# Patient Record
Sex: FEMALE | Race: WHITE | NOT HISPANIC OR LATINO | Employment: OTHER | URBAN - METROPOLITAN AREA
[De-identification: names, ages, dates, MRNs, and addresses within clinical notes are randomized per-mention and may not be internally consistent; named-entity substitution may affect disease eponyms.]

---

## 2017-11-13 ENCOUNTER — HOSPITAL ENCOUNTER (EMERGENCY)
Facility: HOSPITAL | Age: 62
Discharge: HOME | End: 2017-11-13
Admitting: EMERGENCY MEDICINE
Payer: COMMERCIAL

## 2017-11-13 VITALS
DIASTOLIC BLOOD PRESSURE: 115 MMHG | HEIGHT: 65 IN | WEIGHT: 210 LBS | SYSTOLIC BLOOD PRESSURE: 165 MMHG | HEART RATE: 81 BPM | RESPIRATION RATE: 18 BRPM | OXYGEN SATURATION: 96 % | BODY MASS INDEX: 34.99 KG/M2 | TEMPERATURE: 97.7 F

## 2017-11-13 DIAGNOSIS — I83.899 BLEEDING FROM VARICOSE VEIN: Primary | ICD-10-CM

## 2017-11-13 PROCEDURE — 99283 EMERGENCY DEPT VISIT LOW MDM: CPT

## 2017-11-13 RX ORDER — PANTOPRAZOLE SODIUM 40 MG/1
20 TABLET, DELAYED RELEASE ORAL DAILY
COMMUNITY
End: 2020-06-15 | Stop reason: SDUPTHER

## 2017-11-13 RX ORDER — ASPIRIN 81 MG/1
81 TABLET, CHEWABLE ORAL EVERY OTHER DAY
COMMUNITY

## 2017-11-13 RX ORDER — ESCITALOPRAM OXALATE 10 MG/1
10 TABLET ORAL DAILY
COMMUNITY

## 2017-11-13 RX ORDER — MELATONIN
2000 DAILY
COMMUNITY

## 2017-11-13 RX ORDER — ENALAPRIL MALEATE 10 MG/1
10 TABLET ORAL
COMMUNITY
End: 2019-05-08

## 2017-11-13 NOTE — DISCHARGE INSTRUCTIONS
Varicose Veins, Ambulatory Care   GENERAL INFORMATION:   Varicose veins  are veins that become large, twisted, and swollen  They are common on the back of your calves, knees, and thighs  Common symptoms include the following:   · Blue, purple, or bulging veins in your legs     · Pain, swelling, or muscle cramps in your legs    · Feeling of heaviness in your legs  Seek immediate care for the following symptoms:   · A wound that does not heal or is infected    · An injury that has broken your skin and caused your varicose veins to bleed    · Swollen and hard leg    · Pain in your leg that does not go away or gets worse    · Legs or feet turn blue or black    · Warmth, tenderness, and pain in your leg (may also look swollen and red)  Treatment of varicose veins  aims to decrease symptoms, improve appearance, and prevent further problems  Treatment will depend on which veins are affected and how severe your condition is  Prescription pain medicine may be given  Ask how to take this medicine safely  Procedures may be done to remove your varicose veins  Your healthcare provider may inject a solution or use a laser to close the varicose veins  Surgery to remove long veins may also be done  Ask your healthcare provider for more information about procedures used in treating varicose veins  Manage varicose veins:   · Wear pressure stockings  The stockings are tight and put pressure on your legs  They improve blood flow and help prevent clots  · Elevate  your legs above the level of your heart for 15 to 30 minutes several times a day  This will help blood to flow back to your heart  · Avoid sitting or standing for long periods of time  This can cause the blood to collect in your legs and make your symptoms worse  Walk around for a few minutes every hour to get blood moving in your legs  · Avoid wearing tight clothing and shoes  Avoid wearing high-heeled shoes   Do not wear clothes that are tight around the waist     · Get plenty of exercise  Talk to your healthcare provider about the best exercise plan for you  Exercise can decrease your blood pressure and improve your health  Bend or rotate your ankles several times every hour  This will help blood to flow back to the heart  · Maintain a healthy weight  Your heart works harder when you are overweight and this can make varicose vein worse  Ask how much you should weigh  Ask him to help you create a weight loss plan if you are overweight  · Do not smoke  If you smoke, it is never too late to quit  Ask for information if you need help quitting  Follow up with your healthcare provider as directed:  Write down your questions so you remember to ask them during your visits  CARE AGREEMENT:   You have the right to help plan your care  Learn about your health condition and how it may be treated  Discuss treatment options with your caregivers to decide what care you want to receive  You always have the right to refuse treatment  The above information is an  only  It is not intended as medical advice for individual conditions or treatments  Talk to your doctor, nurse or pharmacist before following any medical regimen to see if it is safe and effective for you  © 2014 [address] is for End User's use only and may not be sold, redistributed or otherwise used for commercial purposes  All illustrations and images included in CareNotes® are the copyrighted property of [employer]  or Jus Ferraro

## 2017-11-15 NOTE — ED PROVIDER NOTES
History  Chief Complaint   Patient presents with    Laceration     Pt cut left leg while shaving  States she knicked her varicose vein  History provided by:  Patient and EMS personnel  Laceration   Location: left pretibial area  Quality comment:  Superficial varicose vein started bleeding upon shaving legs with manual razor  Time since incident:  25 minutes  Laceration mechanism:  Razor (shower razor)  Pain details:     Severity:  No pain  Foreign body present:  No foreign bodies  Relieved by:  Pressure  Worsened by:  Nothing  Tetanus status:  Up to date  Associated symptoms: no fever, no focal weakness, no numbness, no rash and no swelling        Prior to Admission Medications   Prescriptions Last Dose Informant Patient Reported? Taking?   aspirin 81 mg chewable tablet   Yes Yes   Sig: Chew 81 mg daily   cholecalciferol (VITAMIN D3) 1,000 units tablet   Yes Yes   Sig: Take 2,000 Units by mouth daily   enalapril (VASOTEC) 10 mg tablet   Yes Yes   Sig: Take 10 mg by mouth daily   escitalopram (LEXAPRO) 10 mg tablet   Yes Yes   Sig: Take 5 mg by mouth daily   metFORMIN (GLUCOPHAGE) 1000 MG tablet   Yes Yes   Sig: Take 1,000 mg by mouth 2 (two) times a day with meals   pantoprazole (PROTONIX) 40 mg tablet   Yes Yes   Sig: Take 40 mg by mouth daily      Facility-Administered Medications: None       Past Medical History:   Diagnosis Date    Arthritis     COPD (chronic obstructive pulmonary disease) (HCC)     Diabetes mellitus (HCC)     GERD (gastroesophageal reflux disease)     Renal disorder        Past Surgical History:   Procedure Laterality Date     SECTION      RENAL CYST EXCISION         History reviewed  No pertinent family history  I have reviewed and agree with the history as documented      Social History   Substance Use Topics    Smoking status: Never Smoker    Smokeless tobacco: Never Used    Alcohol use No        Review of Systems   Constitutional: Negative for activity change, appetite change, chills, diaphoresis, fatigue, fever and unexpected weight change  HENT: Negative for congestion, ear pain, postnasal drip, rhinorrhea, sinus pressure and sore throat  Eyes: Negative for pain, discharge and redness  Respiratory: Negative for cough, chest tightness and shortness of breath  Cardiovascular: Negative for chest pain, palpitations and leg swelling  Gastrointestinal: Negative for abdominal pain, constipation, diarrhea, nausea and vomiting  Genitourinary: Negative for difficulty urinating, dysuria, flank pain, frequency, hematuria and urgency  Musculoskeletal: Negative for arthralgias, back pain and myalgias  Skin: Negative for color change, rash and wound  Allergic/Immunologic: Negative for immunocompromised state  Neurological: Negative for dizziness, tremors, focal weakness, syncope, weakness, numbness and headaches  Physical Exam  ED Triage Vitals [11/13/17 1204]   Temperature Pulse Respirations Blood Pressure SpO2   97 7 °F (36 5 °C) 81 18 (!) 165/115 96 %      Temp Source Heart Rate Source Patient Position - Orthostatic VS BP Location FiO2 (%)   Tympanic Monitor Lying Right arm --      Pain Score       No Pain           Orthostatic Vital Signs  Vitals:    11/13/17 1204   BP: (!) 165/115   Pulse: 81   Patient Position - Orthostatic VS: Lying       Physical Exam   Constitutional: She appears well-developed and well-nourished  No distress  HENT:   Head: Normocephalic and atraumatic  Mouth/Throat: Oropharynx is clear and moist    Eyes: Conjunctivae are normal  Pupils are equal, round, and reactive to light  Neck: Neck supple  Cardiovascular: Normal rate, regular rhythm, normal heart sounds and intact distal pulses  Pulmonary/Chest: Effort normal and breath sounds normal  No respiratory distress  She exhibits no tenderness  Musculoskeletal: Normal range of motion  She exhibits no edema, tenderness or deformity  Neurological: She is alert     Skin: Skin is warm and dry  Capillary refill takes less than 2 seconds  No rash noted  She is not diaphoretic  No erythema  Multiple superficial varicosities bilateral lower extremities  Area left anterior shin injury with no skin tissue loss or bleeding at this time  Wound cleansed and dressed  Psychiatric: She has a normal mood and affect  Nursing note and vitals reviewed  ED Medications  Medications - No data to display    Diagnostic Studies  Results Reviewed     None                 No orders to display              Procedures  Procedures       Phone Contacts  ED Phone Contact    ED Course  ED Course                                MDM  Number of Diagnoses or Management Options  Bleeding from varicose vein: new and does not require workup  Patient Progress  Patient progress: stable    CritCare Time    Disposition  Final diagnoses:   Bleeding from varicose vein     Time reflects when diagnosis was documented in both MDM as applicable and the Disposition within this note     Time User Action Codes Description Comment    11/13/2017 12:21 PM Cruzito Cha Add [I83 [id number]] Bleeding from varicose vein       ED Disposition     ED Disposition Condition Comment    Discharge  Rosario Plrob discharge to home/self care      Condition at discharge: Good        Follow-up Information     Follow up With Specialties Details Why Contact Info    pcp  Schedule an appointment as soon as possible for a visit          Discharge Medication List as of 11/13/2017 12:21 PM      CONTINUE these medications which have NOT CHANGED    Details   aspirin 81 mg chewable tablet Chew 81 mg daily, Historical Med      cholecalciferol (VITAMIN D3) 1,000 units tablet Take 2,000 Units by mouth daily, Historical Med      enalapril (VASOTEC) 10 mg tablet Take 10 mg by mouth daily, Historical Med      escitalopram (LEXAPRO) 10 mg tablet Take 5 mg by mouth daily, Historical Med      metFORMIN (GLUCOPHAGE) 1000 MG tablet Take 1,000 mg by mouth 2 (two) times a day with meals, Historical Med      pantoprazole (PROTONIX) 40 mg tablet Take 40 mg by mouth daily, Historical Med           No discharge procedures on file      ED Provider  Electronically Signed by           Trell Lynne PA-C  11/15/17 0333

## 2017-12-12 LAB
CREAT ?TM UR-SCNC: 39.9 UMOL/L
EXT MICROALBUMIN URINE RANDOM: 4.8
HBA1C MFR BLD HPLC: 6.2 %
HCV AB SER-ACNC: 0.1
MICROALBUMIN/CREAT UR: 12 MG/G{CREAT}

## 2018-03-12 LAB
CREAT ?TM UR-SCNC: 149.3 UMOL/L
EXT MICROALBUMIN URINE RANDOM: 17.7
HBA1C MFR BLD HPLC: 6.3 %
HCV AB SER-ACNC: <0.1
MICROALBUMIN/CREAT UR: 11.9 MG/G{CREAT}

## 2018-07-09 LAB
CREAT ?TM UR-SCNC: 92 UMOL/L
CREAT ?TM UR-SCNC: 92 UMOL/L
EXT MICROALBUMIN URINE RANDOM: 8.8
HBA1C MFR BLD HPLC: 6.3 %
HBA1C MFR BLD HPLC: 6.3 %
MICROALBUMIN UR-MCNC: 8.8 MG/L (ref 0–20)
MICROALBUMIN/CREAT UR: 9.6 MG/G{CREAT}
MICROALBUMIN/CREAT UR: 9.6 MG/G{CREAT}

## 2018-10-29 ENCOUNTER — OFFICE VISIT (OUTPATIENT)
Dept: ENDOCRINOLOGY | Facility: HOSPITAL | Age: 63
End: 2018-10-29
Payer: COMMERCIAL

## 2018-10-29 VITALS
HEART RATE: 74 BPM | SYSTOLIC BLOOD PRESSURE: 124 MMHG | DIASTOLIC BLOOD PRESSURE: 80 MMHG | WEIGHT: 213.4 LBS | BODY MASS INDEX: 35.56 KG/M2 | HEIGHT: 65 IN

## 2018-10-29 DIAGNOSIS — E11.9 TYPE 2 DIABETES MELLITUS WITHOUT COMPLICATION, WITHOUT LONG-TERM CURRENT USE OF INSULIN (HCC): Primary | ICD-10-CM

## 2018-10-29 DIAGNOSIS — I10 ESSENTIAL HYPERTENSION: ICD-10-CM

## 2018-10-29 DIAGNOSIS — E78.5 HYPERLIPIDEMIA, UNSPECIFIED HYPERLIPIDEMIA TYPE: ICD-10-CM

## 2018-10-29 DIAGNOSIS — E83.52 HYPERCALCEMIA: ICD-10-CM

## 2018-10-29 PROCEDURE — 99204 OFFICE O/P NEW MOD 45 MIN: CPT | Performed by: INTERNAL MEDICINE

## 2018-10-29 RX ORDER — ATORVASTATIN CALCIUM 40 MG/1
40 TABLET, FILM COATED ORAL DAILY
COMMUNITY
End: 2019-05-08 | Stop reason: SDUPTHER

## 2018-10-29 NOTE — PROGRESS NOTES
10/29/2018    Assessment/Plan      Diagnoses and all orders for this visit:    Type 2 diabetes mellitus without complication, without long-term current use of insulin ([facility] [address] )  -     Comprehensive metabolic panel Lab Collect; Future  -     Hemoglobin A1C; Future    Hyperlipidemia, unspecified hyperlipidemia type    Essential hypertension    Hypercalcemia  -     Comprehensive metabolic panel Lab Collect; Future  -     Calcium, ionized; Future  -     Vitamin D 25 hydroxy; Future  -     Vitamin D 1,25 dihydroxy; Future  -     Phosphorus; Future  -     Calcium, urine, 24 hour; Future  -     Creatinine, urine, 24 hour; Future  -     PTH, intact; Future    Other orders  -     atorvastatin (LIPITOR) 40 mg tablet; Take 40 mg by mouth daily  -     Halobetasol-Ammonium Lactate 0 05 & 12 % (Cream) KIT; Apply topically        Assessment/Plan:  1  Type 2 diabetes: Without complication  Well controlled on metformin 1000 mg twice a day  We will plan to see the patient back around February of 2019 with an A1c and CMP just prior  If blood work looks stable at that time, we will consider spacing out her appointments even more  2   Hypertension:  Controlled on enalapril 10 mg daily  3   Hyperlipidemia:  Controlled on Lipitor 40 mg daily  4   Hypercalcemia: In her July 2018 blood work her calcium total is at the upper end of the normal range with an albumin of 4 5  PTH level was normal  Reportedly her calcium has been slightly higher upper end of the normal range in the past   I suggested we acquire more data and check a CMP, PTH, ionized calcium, 25 hydroxy vitamin-D, 1, 25 dihydroxy vitamin-D, phosphorus as well as a 24 urine collection for calcium to further evaluate  CC: Diabetes Consult    History of Present Illness     HPI: Adair Villegas is a 58y o  year old female with type 2 diabetes for 5 years  She is on oral agents at home and takes metformin 1000 mg twice a day  Does note some polyuria and nocturia  She denies neuropathy, nephropathy and retinopathy  Hypoglycemic episodes: No      She gets an eye exam annually  Blood Sugar/Glucometer/Pump/CGM review:   No logs but states fasting sugar typically 120-130  Hypertension takes enalapril 10 mg daily and for hyperlipidemia she takes Lipitor 40 mg daily  Hypercalcemia:   Reportedly has had high normal calcium or mildly elevated calcium  Her nephrologist was concerned about this giving history of 3 kidney stones  She is unsure if there calcium based stones  She does have a family history of kidney stones  No family history of parathyroid problems to her knowledge but does report her sister had thyroid disorder  She does have a history of osteoporosis treated with 2 treatments of Reclast and now with osteopenia  Review of Systems   Constitutional: Positive for fatigue  HENT: Negative for trouble swallowing and voice change  Eyes: Negative for visual disturbance  Respiratory: Negative for shortness of breath  Cardiovascular: Negative for chest pain, palpitations and leg swelling  Gastrointestinal: Positive for abdominal pain and diarrhea  Endocrine: Positive for polydipsia and polyuria  Musculoskeletal: Negative for arthralgias and myalgias  Skin: Negative for rash  Neurological: Negative for dizziness, tremors and weakness  Hematological: Negative for adenopathy  Psychiatric/Behavioral: Negative for agitation and confusion         Historical Information   Past Medical History:   Diagnosis Date    Arthritis     [facility]     Diabetes mellitus ([facility] )     GERD (gastroesophageal reflux disease)     Renal disorder      Past Surgical History:   Procedure Laterality Date     SECTION      RENAL CYST EXCISION       Social History   History   Alcohol Use No     History   Drug Use No     History   Smoking Status    Former Smoker    Packs/day: 0 50    Types: Cigarettes    Quit date: 10/29/1981   Smokeless Tobacco    Never Used     Family History:   Family History   Problem Relation Age of Onset   Airam Mendez COPD Mother     Lung cancer Mother     Lung cancer Father     Hypertension Father     No Known Problems Sister     Hypertension Brother     Kidney disease Brother     Stroke Brother     Kidney disease Brother     Hypertension Brother     Hyperlipidemia Brother     Gestational diabetes Sister        Meds/Allergies   Current Outpatient Prescriptions   Medication Sig Dispense Refill    aspirin 81 mg chewable tablet Chew 81 mg daily      atorvastatin (LIPITOR) 40 mg tablet Take 40 mg by mouth daily      cholecalciferol (VITAMIN D3) 1,000 units tablet Take 2,000 Units by mouth daily      enalapril (VASOTEC) 10 mg tablet Take 10 mg by mouth 1 tablet in the morning and half at night   Halobetasol-Ammonium Lactate 0 05 & 12 % (Cream) KIT Apply topically      metFORMIN (GLUCOPHAGE) 1000 MG tablet Take 1,000 mg by mouth 2 (two) times a day with meals      pantoprazole (PROTONIX) 40 mg tablet Take 40 mg by mouth daily      escitalopram (LEXAPRO) 10 mg tablet Take 5 mg by mouth daily       No current facility-administered medications for this visit  Allergies   Allergen Reactions    Demerol [Meperidine] GI Intolerance     Other reaction(s): N/V    Morphine GI Intolerance     Other reaction(s): N/V       Objective   Vitals: Blood pressure 124/80, pulse 74, height 5' 5" (1 651 m), weight 96 8 kg (213 lb 6 4 oz)  Invasive Devices          No matching active lines, drains, or airways          Physical Exam   Constitutional: She is oriented to person, place, and time  She appears well-developed and well-nourished  No distress  HENT:   Head: Normocephalic and atraumatic  Eyes: Pupils are equal, round, and reactive to light  Conjunctivae are normal    Neck: Normal range of motion  Neck supple  No thyromegaly present  Cardiovascular: Normal rate and regular rhythm      No murmur heard   Pulses:       Dorsalis pedis pulses are 2+ on the right side, and 2+ on the left side  Posterior tibial pulses are 2+ on the right side, and 2+ on the left side  Pulmonary/Chest: Effort normal and breath sounds normal  No respiratory distress  Abdominal: Soft  Bowel sounds are normal  She exhibits no distension  Musculoskeletal: Normal range of motion  She exhibits no edema  Feet:   Right Foot:   Skin Integrity: Negative for ulcer, skin breakdown, erythema, warmth, callus or dry skin  Left Foot:   Skin Integrity: Negative for ulcer, skin breakdown, erythema, warmth, callus or dry skin  Neurological: She is alert and oriented to person, place, and time  She exhibits normal muscle tone  Skin: Skin is warm and dry  No rash noted  She is not diaphoretic  Psychiatric: She has a normal mood and affect  Her behavior is normal    Vitals reviewed  Patient's shoes and socks removed  Right Foot/Ankle   Right Foot Inspection  Skin Exam: skin normal and skin intact no dry skin, no warmth, no callus, no erythema, no maceration, no abnormal color, no pre-ulcer, no ulcer and no callus                            Sensory   Vibration: intact    Monofilament testing: intact  Vascular    The right DP pulse is 2+  The right PT pulse is 2+  Left Foot/Ankle  Left Foot Inspection  Skin Exam: skin normal and skin intactno dry skin, no warmth, no erythema, no maceration, normal color, no pre-ulcer, no ulcer and no callus                                         Sensory   Vibration: intact    Monofilament: intact  Vascular    The left DP pulse is 2+  The left PT pulse is 2+  Assign Risk Category:  No deformity present; No loss of protective sensation;        Risk: 0      The history was obtained from the review of the chart and from the patient  Lab Results:   Labs from [facility] on 07/09/2018:   White blood cell 6 2, hemoglobin 12 5, platelets 882, glucose 137, BUN 14, creatinine 0 7, GFR 93, sodium 141, potassium 4 4, calcium 10 1, albumin 4 5, liver function within normal limits, total cholesterol 110, triglycerides 92, HDL 42, LDL 50, urine albumin to creatinine ratio 9 6, A1c 6 3, TSH 1 22, CK 34, PTH 47  No future appointments  Portions of the record may have been created with voice recognition software  Occasional wrong word or "sound a like" substitutions may have occurred due to the inherent limitations of voice recognition software  Read the chart carefully and recognize, using context, where substitutions have occurred

## 2018-10-29 NOTE — LETTER
October 29, 2018     Natalya Shell MD  [address] [place] [address]    Patient: Mica Camarillo   YOB: 1955   Date of Visit: 10/29/2018       Dear Dr Jose Mora: Thank you for referring Mica Camarillo to me for evaluation  Below are my notes for this consultation  If you have questions, please do not hesitate to call me  I look forward to following your patient along with you  Sincerely,        Orlando Warren DO        CC: No Recipients  Orlando Warren DO  10/29/2018  8:14 AM  Sign at close encounter  10/29/2018    Assessment/Plan      Diagnoses and all orders for this visit:    Type 2 diabetes mellitus without complication, without long-term current use of insulin ([facility] [address] )  -     Comprehensive metabolic panel Lab Collect; Future  -     Hemoglobin A1C; Future    Hyperlipidemia, unspecified hyperlipidemia type    Essential hypertension    Hypercalcemia  -     Comprehensive metabolic panel Lab Collect; Future  -     Calcium, ionized; Future  -     Vitamin D 25 hydroxy; Future  -     Vitamin D 1,25 dihydroxy; Future  -     Phosphorus; Future  -     Calcium, urine, 24 hour; Future  -     Creatinine, urine, 24 hour; Future  -     PTH, intact; Future    Other orders  -     atorvastatin (LIPITOR) 40 mg tablet; Take 40 mg by mouth daily  -     Halobetasol-Ammonium Lactate 0 05 & 12 % (Cream) KIT; Apply topically        Assessment/Plan:  1  Type 2 diabetes: Without complication  Well controlled on metformin 1000 mg twice a day  We will plan to see the patient back around February of 2019 with an A1c and CMP just prior  If blood work looks stable at that time, we will consider spacing out her appointments even more  2   Hypertension:  Controlled on enalapril 10 mg daily  3   Hyperlipidemia:  Controlled on Lipitor 40 mg daily  4   Hypercalcemia:   In her July 2018 blood work her calcium total is at the upper end of the normal range with an albumin of 4  5   PTH level was normal  Reportedly her calcium has been slightly higher upper end of the normal range in the past   I suggested we acquire more data and check a CMP, PTH, ionized calcium, 25 hydroxy vitamin-D, 1, 25 dihydroxy vitamin-D, phosphorus as well as a 24 urine collection for calcium to further evaluate  CC: Diabetes Consult    History of Present Illness     HPI: Estefani Hogue is a 58y o  year old female with type 2 diabetes for 5 years  She is on oral agents at home and takes metformin 1000 mg twice a day  Does note some polyuria and nocturia  She denies neuropathy, nephropathy and retinopathy  Hypoglycemic episodes: No      She gets an eye exam annually  Blood Sugar/Glucometer/Pump/CGM review:   No logs but states fasting sugar typically 120-130  Hypertension takes enalapril 10 mg daily and for hyperlipidemia she takes Lipitor 40 mg daily  Hypercalcemia:   Reportedly has had high normal calcium or mildly elevated calcium  Her nephrologist was concerned about this giving history of 3 kidney stones  She is unsure if there calcium based stones  She does have a family history of kidney stones  No family history of parathyroid problems to her knowledge but does report her sister had thyroid disorder  She does have a history of osteoporosis treated with 2 treatments of Reclast and now with osteopenia  Review of Systems   Constitutional: Positive for fatigue  HENT: Negative for trouble swallowing and voice change  Eyes: Negative for visual disturbance  Respiratory: Negative for shortness of breath  Cardiovascular: Negative for chest pain, palpitations and leg swelling  Gastrointestinal: Positive for abdominal pain and diarrhea  Endocrine: Positive for polydipsia and polyuria  Musculoskeletal: Negative for arthralgias and myalgias  Skin: Negative for rash  Neurological: Negative for dizziness, tremors and weakness  Hematological: Negative for adenopathy  Psychiatric/Behavioral: Negative for agitation and confusion  Historical Information   Past Medical History:   Diagnosis Date    Arthritis     COPD [facility]chronic obstructive pulmonary disease) ([facility] [address] )     Diabetes mellitus ([facility] )     GERD (gastroesophageal reflux disease)     Renal disorder      Past Surgical History:   Procedure Laterality Date     SECTION      RENAL CYST EXCISION       Social History   History   Alcohol Use No     History   Drug Use No     History   Smoking Status    Former Smoker    Packs/day: 0 50    Types: Cigarettes    Quit date: 10/29/1981   Smokeless Tobacco    Never Used     Family History:   Family History   Problem Relation Age of Onset    COPD Mother     Lung cancer Mother     Lung cancer Father     Hypertension Father     No Known Problems Sister     Hypertension Brother     Kidney disease Brother     Stroke Brother     Kidney disease Brother     Hypertension Brother     Hyperlipidemia Brother     Gestational diabetes Sister        Meds/Allergies   Current Outpatient Prescriptions   Medication Sig Dispense Refill    aspirin 81 mg chewable tablet Chew 81 mg daily      atorvastatin (LIPITOR) 40 mg tablet Take 40 mg by mouth daily      cholecalciferol (VITAMIN D3) 1,000 units tablet Take 2,000 Units by mouth daily      enalapril (VASOTEC) 10 mg tablet Take 10 mg by mouth 1 tablet in the morning and half at night   Halobetasol-Ammonium Lactate 0 05 & 12 % (Cream) KIT Apply topically      metFORMIN (GLUCOPHAGE) 1000 MG tablet Take 1,000 mg by mouth 2 (two) times a day with meals      pantoprazole (PROTONIX) 40 mg tablet Take 40 mg by mouth daily      escitalopram (LEXAPRO) 10 mg tablet Take 5 mg by mouth daily       No current facility-administered medications for this visit        Allergies   Allergen Reactions    Demerol [Meperidine] GI Intolerance     Other reaction(s): N/V    Morphine GI Intolerance     Other reaction(s): N/V       Objective Vitals: Blood pressure 124/80, pulse 74, height 5' 5" (1 651 m), weight 96 8 kg (213 lb 6 4 oz)  Invasive Devices          No matching active lines, drains, or airways          Physical Exam   Constitutional: She is oriented to person, place, and time  She appears well-developed and well-nourished  No distress  HENT:   Head: Normocephalic and atraumatic  Eyes: Pupils are equal, round, and reactive to light  Conjunctivae are normal    Neck: Normal range of motion  Neck supple  No thyromegaly present  Cardiovascular: Normal rate and regular rhythm  No murmur heard  Pulses:       Dorsalis pedis pulses are 2+ on the right side, and 2+ on the left side  Posterior tibial pulses are 2+ on the right side, and 2+ on the left side  Pulmonary/Chest: Effort normal and breath sounds normal  No respiratory distress  Abdominal: Soft  Bowel sounds are normal  She exhibits no distension  Musculoskeletal: Normal range of motion  She exhibits no edema  Feet:   Right Foot:   Skin Integrity: Negative for ulcer, skin breakdown, erythema, warmth, callus or dry skin  Left Foot:   Skin Integrity: Negative for ulcer, skin breakdown, erythema, warmth, callus or dry skin  Neurological: She is alert and oriented to person, place, and time  She exhibits normal muscle tone  Skin: Skin is warm and dry  No rash noted  She is not diaphoretic  Psychiatric: She has a normal mood and affect  Her behavior is normal    Vitals reviewed  Patient's shoes and socks removed  Right Foot/Ankle   Right Foot Inspection  Skin Exam: skin normal and skin intact no dry skin, no warmth, no callus, no erythema, no maceration, no abnormal color, no pre-ulcer, no ulcer and no callus                            Sensory   Vibration: intact    Monofilament testing: intact  Vascular    The right DP pulse is 2+  The right PT pulse is 2+       Left Foot/Ankle  Left Foot Inspection  Skin Exam: skin normal and skin intactno dry skin, no warmth, no erythema, no maceration, normal color, no pre-ulcer, no ulcer and no callus                                         Sensory   Vibration: intact    Monofilament: intact  Vascular    The left DP pulse is 2+  The left PT pulse is 2+  Assign Risk Category:  No deformity present; No loss of protective sensation;        Risk: 0      The history was obtained from the review of the chart and from the patient  Lab Results:   Labs from [facility] on 07/09/2018: White blood cell 6 2, hemoglobin 12 5, platelets 370, glucose 137, BUN 14, creatinine 0 7, GFR 93, sodium 141, potassium 4 4, calcium 10 1, albumin 4 5, liver function within normal limits, total cholesterol 110, triglycerides 92, HDL 42, LDL 50, urine albumin to creatinine ratio 9 6, A1c 6 3, TSH 1 22, CK 34, PTH 47  No future appointments  Portions of the record may have been created with voice recognition software  Occasional wrong word or "sound a like" substitutions may have occurred due to the inherent limitations of voice recognition software  Read the chart carefully and recognize, using context, where substitutions have occurred

## 2019-02-15 LAB
CREAT ?TM UR-SCNC: 82 UMOL/L
EXT MICROALBUMIN URINE RANDOM: 13.1
HBA1C MFR BLD HPLC: 6.3 %
MICROALBUMIN/CREAT UR: 16 MG/G{CREAT}

## 2019-02-20 LAB
1,25(OH)2D3 SERPL-MCNC: 40.1 PG/ML (ref 19.9–79.3)
25(OH)D3+25(OH)D2 SERPL-MCNC: 49.2 NG/ML (ref 30–100)
ALBUMIN SERPL-MCNC: 4.3 G/DL (ref 3.6–4.8)
ALBUMIN/GLOB SERPL: 1.8 {RATIO} (ref 1.2–2.2)
ALP SERPL-CCNC: 96 IU/L (ref 39–117)
ALT SERPL-CCNC: 21 IU/L (ref 0–32)
AST SERPL-CCNC: 15 IU/L (ref 0–40)
BILIRUB SERPL-MCNC: 0.8 MG/DL (ref 0–1.2)
BUN SERPL-MCNC: 12 MG/DL (ref 8–27)
BUN/CREAT SERPL: 17 (ref 12–28)
CA-I SERPL ISE-MCNC: 5.9 MG/DL (ref 4.5–5.6)
CALCIUM SERPL-MCNC: 10.6 MG/DL (ref 8.7–10.3)
CHLORIDE SERPL-SCNC: 103 MMOL/L (ref 96–106)
CO2 SERPL-SCNC: 26 MMOL/L (ref 20–29)
CREAT SERPL-MCNC: 0.69 MG/DL (ref 0.57–1)
GLOBULIN SER-MCNC: 2.4 G/DL (ref 1.5–4.5)
GLUCOSE SERPL-MCNC: 135 MG/DL (ref 65–99)
HBA1C MFR BLD: 6.3 % (ref 4.8–5.6)
LABCORP COMMENT: NORMAL
PHOSPHATE SERPL-MCNC: 2.9 MG/DL (ref 2.5–4.5)
POTASSIUM SERPL-SCNC: 4.5 MMOL/L (ref 3.5–5.2)
PROT SERPL-MCNC: 6.7 G/DL (ref 6–8.5)
PTH-INTACT SERPL-MCNC: 48 PG/ML (ref 15–65)
SL AMB EGFR AFRICAN AMERICAN: 106 ML/MIN/1.73
SL AMB EGFR NON AFRICAN AMERICAN: 92 ML/MIN/1.73
SODIUM SERPL-SCNC: 140 MMOL/L (ref 134–144)

## 2019-02-22 ENCOUNTER — OFFICE VISIT (OUTPATIENT)
Dept: ENDOCRINOLOGY | Facility: HOSPITAL | Age: 64
End: 2019-02-22
Payer: COMMERCIAL

## 2019-02-22 ENCOUNTER — TELEPHONE (OUTPATIENT)
Dept: ENDOCRINOLOGY | Facility: HOSPITAL | Age: 64
End: 2019-02-22

## 2019-02-22 VITALS
HEART RATE: 74 BPM | HEIGHT: 65 IN | BODY MASS INDEX: 35.29 KG/M2 | WEIGHT: 211.8 LBS | DIASTOLIC BLOOD PRESSURE: 76 MMHG | SYSTOLIC BLOOD PRESSURE: 132 MMHG

## 2019-02-22 DIAGNOSIS — E11.9 TYPE 2 DIABETES MELLITUS WITHOUT COMPLICATION, WITHOUT LONG-TERM CURRENT USE OF INSULIN (HCC): Primary | ICD-10-CM

## 2019-02-22 DIAGNOSIS — I10 ESSENTIAL HYPERTENSION: ICD-10-CM

## 2019-02-22 DIAGNOSIS — E78.5 HYPERLIPIDEMIA, UNSPECIFIED HYPERLIPIDEMIA TYPE: ICD-10-CM

## 2019-02-22 DIAGNOSIS — E83.52 HYPERCALCEMIA: ICD-10-CM

## 2019-02-22 PROCEDURE — 99214 OFFICE O/P EST MOD 30 MIN: CPT | Performed by: INTERNAL MEDICINE

## 2019-02-22 RX ORDER — LOSARTAN POTASSIUM 50 MG/1
50 TABLET ORAL DAILY
COMMUNITY
End: 2019-05-08 | Stop reason: SDUPTHER

## 2019-02-22 NOTE — TELEPHONE ENCOUNTER
Can we acquire kidney ultrasound from [facility] that the patient had done recently? Thanks

## 2019-02-22 NOTE — PROGRESS NOTES
2/22/2019    Assessment/Plan      Diagnoses and all orders for this visit:    Type 2 diabetes mellitus without complication, without long-term current use of insulin ([address] )  -     HEMOGLOBIN A1C W/ EAG ESTIMATION Lab Collect; Future  -     Comprehensive metabolic panel Lab Collect; Future    Essential hypertension    Hyperlipidemia, unspecified hyperlipidemia type    Hypercalcemia  -     Calcium, urine, 24 hour Lab Collect; Future  -     Creatinine, urine, 24 hour- Lab Collect; Future  -     DXA bone density spine hip and pelvis; Future  -     Comprehensive metabolic panel Lab Collect; Future  -     PTH, intact- Lab Collect; Future  -     Microalbumin / creatinine urine ratio- Lab Collect; Future    Other orders  -     losartan (COZAAR) 50 mg tablet; Take 25 mg by mouth daily        Assessment/Plan:  1  Type 2 diabetes:  Controlled on current regimen  Continue metformin and we will see her back in 3 months with an A1c, CMP just prior  2  Hypertension:  Normotensive in the office today  3  Hyperlipidemia:  Continue atorvastatin  4  Hypercalcemia:  I suspect this is on the basis of primary hyperparathyroidism in the setting of an inappropriately normal parathyroid hormone level in the setting of mild hypercalcemia  She did have a kidney ultrasound recently and [facility]  I have also suggested we obtain a 24 urine collection for calcium and creatinine in addition to a DEXA scan for the diagnosis of primary hyperparathyroidism which should include a peripheral wrist density  At that point we will call her with the results and go from there  Addendum:  Received ultrasound of the kidneys from [facility] done on 10/25/2018 which showed multiple cysts throughout both kidneys more numerous in the right kidney where there appears to be diminished functional renal parenchyma  The largest cyst is in the upper pole of the right kidney measuring 10 4 cm in diameter  There is a 7 3 mm echogenic focus in the mid left renal pelvis presumably of nonobstructive calculus  Diffuse smooth bladder wall thickening possibly secondary to obstructive uropathy, long-standing infection, or inflammation  Elevated postvoid residual of 64 mL  CC: Diabetes Consult    History of Present Illness     HPI: Tiffanie Hamm is a 58y o  year old female with type 2 diabetes for 5 years  She is on oral agents at home and takes metformin 1000 mg twice a day  Has some polyuria and nocturia unchanged  She denies neuropathy, nephropathy and retinopathy  Hypoglycemic episodes: No      The patient's next eye exam is next week  The patient's last foot exam was in 2018  Blood Sugar/Glucometer/Pump/CGM: 120-130s in AM     She also has a history of kidney stones and follows with Nephrology  For hypertension she is maintained on losartan 25 mg daily as well as enalapril 10 mg daily  For hyperlipidemia she takes atorvastatin 40 mg daily  Review of Systems   Constitutional: Negative for fatigue  HENT: Negative for trouble swallowing and voice change  Eyes: Negative for visual disturbance  Respiratory: Negative for shortness of breath  Cardiovascular: Negative for palpitations and leg swelling  Gastrointestinal: Negative for abdominal pain, nausea and vomiting  Endocrine: Negative for polydipsia and polyuria  Musculoskeletal: Negative for arthralgias and myalgias  Skin: Negative for rash  Neurological: Negative for dizziness, tremors and weakness  Hematological: Negative for adenopathy  Psychiatric/Behavioral: Negative for agitation and confusion         Historical Information   Past Medical History:   Diagnosis Date    Arthritis     COPD (chronic obstructive pulmonary disease) ([facility] [address] )     Diabetes mellitus ([facility] [address] )     GERD (gastroesophageal reflux disease)     Renal disorder      Past Surgical History:   Procedure Laterality Date     SECTION      RENAL CYST EXCISION       Social History   Social History     Substance and Sexual Activity   Alcohol Use No     Social History     Substance and Sexual Activity   Drug Use No     Social History     Tobacco Use   Smoking Status Former Smoker    Packs/day: 0 50    Types: Cigarettes    Last attempt to quit: 10/29/1981    Years since quittin 3   Smokeless Tobacco Never Used     Family History:   Family History   Problem Relation Age of Onset    COPD Mother    Small Lung cancer Mother    Small Lung cancer Father     Hypertension Father     No Known Problems Sister     Hypertension Brother     Kidney disease Brother     Stroke Brother     Kidney disease Brother     Hypertension Brother     Hyperlipidemia Brother     Gestational diabetes Sister        Meds/Allergies   Current Outpatient Medications   Medication Sig Dispense Refill    aspirin 81 mg chewable tablet Chew 81 mg daily      atorvastatin (LIPITOR) 40 mg tablet Take 40 mg by mouth daily      cholecalciferol (VITAMIN D3) 1,000 units tablet Take 2,000 Units by mouth daily      Halobetasol-Ammonium Lactate 0 05 & 12 % (Cream) KIT Apply topically      losartan (COZAAR) 50 mg tablet Take 25 mg by mouth daily      metFORMIN (GLUCOPHAGE) 1000 MG tablet Take 1,000 mg by mouth 2 (two) times a day with meals      pantoprazole (PROTONIX) 40 mg tablet Take 40 mg by mouth daily      enalapril (VASOTEC) 10 mg tablet Take 10 mg by mouth 1 tablet in the morning and half at night   escitalopram (LEXAPRO) 10 mg tablet Take 5 mg by mouth daily       No current facility-administered medications for this visit  Allergies   Allergen Reactions    Demerol [Meperidine] GI Intolerance     Other reaction(s): N/V    Morphine GI Intolerance     Other reaction(s): N/V       Objective   Vitals: Blood pressure 132/76, pulse 74, height 5' 5" (1 651 m), weight 96 1 kg (211 lb 12 8 oz)    Invasive Devices          None          Physical Exam   Constitutional: She is oriented to person, place, and time  She appears well-developed and well-nourished  No distress  HENT:   Head: Normocephalic and atraumatic  Eyes: Pupils are equal, round, and reactive to light  Conjunctivae are normal    Neck: Normal range of motion  Neck supple  No thyromegaly present  Cardiovascular: Normal rate and regular rhythm  Pulmonary/Chest: Effort normal and breath sounds normal    Abdominal: Soft  Bowel sounds are normal    Musculoskeletal: Normal range of motion  She exhibits no edema  Neurological: She is alert and oriented to person, place, and time  She exhibits normal muscle tone  Skin: Skin is warm and dry  No rash noted  She is not diaphoretic  Psychiatric: She has a normal mood and affect  Her behavior is normal    Vitals reviewed  The history was obtained from the review of the chart and from the patient  Lab Results:    Most recent Alc is  Lab Results   Component Value Date    HGBA1C 6 3 (H) 02/19/2019             Lab Results   Component Value Date    BUN 12 02/19/2019    K 4 5 02/19/2019     02/19/2019    CO2 26 02/19/2019       Lab Results   Component Value Date    ALT 21 02/19/2019    AST 15 02/19/2019       No results found for: TSH, FREET4, TSI    Recent Results (from the past 00213 hour(s))   Microalbumin / creatinine urine ratio    Collection Time: 07/09/18 12:00 AM   Result Value Ref Range    EXT Creatinine Urine 92 0     Microalbum  ,U,Random 8 8 0 0 - 20 0 mg/L    EXTERNAL Microalb/Creat Ratio 9 6    Hemoglobin A1C    Collection Time: 07/09/18 12:00 AM   Result Value Ref Range    Hemoglobin A1C 6 3    Comprehensive metabolic panel    Collection Time: 02/19/19  9:56 AM   Result Value Ref Range    Glucose, Random 135 (H) 65 - 99 mg/dL    BUN 12 8 - 27 mg/dL    Creatinine 0 69 0 57 - 1 00 mg/dL    eGFR Non  92 >59 mL/min/1 73    eGFR  106 >59 mL/min/1 73    SL AMB BUN/CREATININE RATIO 17 12 - 28    Sodium 140 134 - 144 mmol/L Potassium 4 5 3 5 - 5 2 mmol/L    Chloride 103 96 - 106 mmol/L    CO2 26 20 - 29 mmol/L    CALCIUM 10 6 (H) 8 7 - 10 3 mg/dL    Protein, Total 6 7 6 0 - 8 5 g/dL    Albumin 4 3 3 6 - 4 8 g/dL    Globulin, Total 2 4 1 5 - 4 5 g/dL    Albumin/Globulin Ratio 1 8 1 2 - 2 2    TOTAL BILIRUBIN 0 8 0 0 - 1 2 mg/dL    Alk Phos Isoenzymes 96 39 - 117 IU/L    AST 15 0 - 40 IU/L    ALT 21 0 - 32 IU/L   VITAMIN D,25-HYDROXY, SERUM    Collection Time: 02/19/19  9:56 AM   Result Value Ref Range    Vit D, 1,25-Dihydroxy 40 1 19 9 - 79 3 pg/mL    25-HYDROXY VIT D 49 2 30 0 - 100 0 ng/mL   Hemoglobin A1c (w/out EAG)    Collection Time: 02/19/19  9:56 AM   Result Value Ref Range    Hemoglobin A1C 6 3 (H) 4 8 - 5 6 %   Phosphorus    Collection Time: 02/19/19  9:56 AM   Result Value Ref Range    Phosphorus, Serum 2 9 2 5 - 4 5 mg/dL   Calcium, ionized    Collection Time: 02/19/19  9:56 AM   Result Value Ref Range    Calcium, Ionized, Serum 5 9 (H) 4 5 - 5 6 mg/dL   PTH, intact    Collection Time: 02/19/19  9:56 AM   Result Value Ref Range    PTH, Intact 48 15 - 65 pg/mL   Specimen Status Report    Collection Time: 02/19/19  9:56 AM   Result Value Ref Range    Comment Comment          No future appointments  Portions of the record may have been created with voice recognition software  Occasional wrong word or "sound a like" substitutions may have occurred due to the inherent limitations of voice recognition software  Read the chart carefully and recognize, using context, where substitutions have occurred

## 2019-02-26 LAB
LEFT EYE DIABETIC RETINOPATHY: NORMAL
RIGHT EYE DIABETIC RETINOPATHY: NORMAL

## 2019-03-12 LAB
CALCIUM 24H UR-MCNC: 13.6 MG/DL
CALCIUM 24H UR-MRATE: 217.6 MG/24 HR (ref 100–300)
CREAT 24H UR-MRATE: 1098 MG/24 HR (ref 800–1800)
CREAT UR-MCNC: 68.6 MG/DL

## 2019-03-18 DIAGNOSIS — E83.52 HYPERCALCEMIA: ICD-10-CM

## 2019-04-29 ENCOUNTER — TELEPHONE (OUTPATIENT)
Dept: ENDOCRINOLOGY | Facility: HOSPITAL | Age: 64
End: 2019-04-29

## 2019-04-30 LAB
25(OH)D3+25(OH)D2 SERPL-MCNC: 51.5 NG/ML (ref 30–100)
ALBUMIN SERPL-MCNC: 4.3 G/DL (ref 3.6–4.8)
ALBUMIN/CREAT UR: 24.2 MG/G CREAT (ref 0–30)
ALBUMIN/GLOB SERPL: 1.7 {RATIO} (ref 1.2–2.2)
ALP SERPL-CCNC: 102 IU/L (ref 39–117)
ALT SERPL-CCNC: 22 IU/L (ref 0–32)
AST SERPL-CCNC: 18 IU/L (ref 0–40)
BILIRUB SERPL-MCNC: 0.6 MG/DL (ref 0–1.2)
BUN SERPL-MCNC: 11 MG/DL (ref 8–27)
BUN/CREAT SERPL: 19 (ref 12–28)
CALCIUM SERPL-MCNC: 10.3 MG/DL (ref 8.7–10.3)
CHLORIDE SERPL-SCNC: 100 MMOL/L (ref 96–106)
CHOLEST SERPL-MCNC: 123 MG/DL (ref 100–199)
CO2 SERPL-SCNC: 26 MMOL/L (ref 20–29)
CREAT SERPL-MCNC: 0.59 MG/DL (ref 0.57–1)
CREAT UR-MCNC: 54.5 MG/DL
EST. AVERAGE GLUCOSE BLD GHB EST-MCNC: 140 MG/DL
GLOBULIN SER-MCNC: 2.5 G/DL (ref 1.5–4.5)
GLUCOSE SERPL-MCNC: 117 MG/DL (ref 65–99)
HBA1C MFR BLD: 6.5 % (ref 4.8–5.6)
HDLC SERPL-MCNC: 39 MG/DL
LABCORP COMMENT: NORMAL
LDLC SERPL CALC-MCNC: 65 MG/DL (ref 0–99)
LDLC/HDLC SERPL: 1.7 RATIO (ref 0–3.2)
MICROALBUMIN UR-MCNC: 13.2 UG/ML
POTASSIUM SERPL-SCNC: 4.1 MMOL/L (ref 3.5–5.2)
PROT SERPL-MCNC: 6.8 G/DL (ref 6–8.5)
PTH-INTACT SERPL-MCNC: 49 PG/ML (ref 15–65)
SL AMB EGFR AFRICAN AMERICAN: 112 ML/MIN/1.73
SL AMB EGFR NON AFRICAN AMERICAN: 97 ML/MIN/1.73
SL AMB VLDL CHOLESTEROL CALC: 19 MG/DL (ref 5–40)
SODIUM SERPL-SCNC: 140 MMOL/L (ref 134–144)
TRIGL SERPL-MCNC: 95 MG/DL (ref 0–149)

## 2019-05-08 ENCOUNTER — OFFICE VISIT (OUTPATIENT)
Dept: ENDOCRINOLOGY | Facility: HOSPITAL | Age: 64
End: 2019-05-08
Payer: COMMERCIAL

## 2019-05-08 VITALS
WEIGHT: 212.6 LBS | HEART RATE: 76 BPM | DIASTOLIC BLOOD PRESSURE: 66 MMHG | HEIGHT: 65 IN | BODY MASS INDEX: 35.42 KG/M2 | SYSTOLIC BLOOD PRESSURE: 130 MMHG

## 2019-05-08 DIAGNOSIS — E11.9 TYPE 2 DIABETES MELLITUS WITHOUT COMPLICATION, WITHOUT LONG-TERM CURRENT USE OF INSULIN (HCC): Primary | ICD-10-CM

## 2019-05-08 DIAGNOSIS — E83.52 HYPERCALCEMIA: ICD-10-CM

## 2019-05-08 DIAGNOSIS — E78.5 HYPERLIPIDEMIA, UNSPECIFIED HYPERLIPIDEMIA TYPE: ICD-10-CM

## 2019-05-08 DIAGNOSIS — I10 ESSENTIAL HYPERTENSION: ICD-10-CM

## 2019-05-08 PROCEDURE — 99214 OFFICE O/P EST MOD 30 MIN: CPT | Performed by: INTERNAL MEDICINE

## 2019-05-08 RX ORDER — ATORVASTATIN CALCIUM 40 MG/1
40 TABLET, FILM COATED ORAL DAILY
Qty: 90 TABLET | Refills: 3 | Status: SHIPPED | OUTPATIENT
Start: 2019-05-08 | End: 2019-05-15 | Stop reason: DRUGHIGH

## 2019-05-08 RX ORDER — TRIAMCINOLONE ACETONIDE 1 MG/G
CREAM TOPICAL
Refills: 2 | COMMUNITY
Start: 2019-04-06 | End: 2020-05-27

## 2019-05-08 RX ORDER — LOSARTAN POTASSIUM 50 MG/1
50 TABLET ORAL DAILY
Qty: 90 TABLET | Refills: 3 | Status: SHIPPED | OUTPATIENT
Start: 2019-05-08 | End: 2020-06-04 | Stop reason: SDUPTHER

## 2019-05-15 ENCOUNTER — TELEPHONE (OUTPATIENT)
Dept: ENDOCRINOLOGY | Facility: HOSPITAL | Age: 64
End: 2019-05-15

## 2019-05-15 DIAGNOSIS — E78.5 HYPERLIPIDEMIA, UNSPECIFIED HYPERLIPIDEMIA TYPE: Primary | ICD-10-CM

## 2019-05-15 RX ORDER — ATORVASTATIN CALCIUM 20 MG/1
20 TABLET, FILM COATED ORAL DAILY
Qty: 90 TABLET | Refills: 2 | Status: CANCELLED | OUTPATIENT
Start: 2019-05-15

## 2019-05-15 RX ORDER — ATORVASTATIN CALCIUM 20 MG/1
TABLET, FILM COATED ORAL
Qty: 90 TABLET | Refills: 3 | Status: SHIPPED | OUTPATIENT
Start: 2019-05-15 | End: 2020-05-27

## 2019-11-14 LAB
25(OH)D3+25(OH)D2 SERPL-MCNC: 49.9 NG/ML (ref 30–100)
ALBUMIN SERPL-MCNC: 4.2 G/DL (ref 3.6–4.8)
ALBUMIN/GLOB SERPL: 1.8 {RATIO} (ref 1.2–2.2)
ALP SERPL-CCNC: 100 IU/L (ref 39–117)
ALT SERPL-CCNC: 18 IU/L (ref 0–32)
AST SERPL-CCNC: 19 IU/L (ref 0–40)
BASOPHILS # BLD AUTO: 0 X10E3/UL (ref 0–0.2)
BASOPHILS NFR BLD AUTO: 0 %
BILIRUB SERPL-MCNC: 0.7 MG/DL (ref 0–1.2)
BUN SERPL-MCNC: 9 MG/DL (ref 8–27)
BUN/CREAT SERPL: 13 (ref 12–28)
CALCIUM SERPL-MCNC: 10.1 MG/DL (ref 8.7–10.3)
CHLORIDE SERPL-SCNC: 100 MMOL/L (ref 96–106)
CHOLEST SERPL-MCNC: 110 MG/DL (ref 100–199)
CO2 SERPL-SCNC: 25 MMOL/L (ref 20–29)
CREAT SERPL-MCNC: 0.69 MG/DL (ref 0.57–1)
EOSINOPHIL # BLD AUTO: 0.1 X10E3/UL (ref 0–0.4)
EOSINOPHIL NFR BLD AUTO: 2 %
ERYTHROCYTE [DISTWIDTH] IN BLOOD BY AUTOMATED COUNT: 13.5 % (ref 12.3–15.4)
EST. AVERAGE GLUCOSE BLD GHB EST-MCNC: 134 MG/DL
GLOBULIN SER-MCNC: 2.4 G/DL (ref 1.5–4.5)
GLUCOSE SERPL-MCNC: 143 MG/DL (ref 65–99)
HBA1C MFR BLD: 6.3 % (ref 4.8–5.6)
HCT VFR BLD AUTO: 37.5 % (ref 34–46.6)
HDLC SERPL-MCNC: 37 MG/DL
HGB BLD-MCNC: 12.2 G/DL (ref 11.1–15.9)
IMM GRANULOCYTES # BLD: 0 X10E3/UL (ref 0–0.1)
IMM GRANULOCYTES NFR BLD: 0 %
LDLC SERPL CALC-MCNC: 48 MG/DL (ref 0–99)
LDLC/HDLC SERPL: 1.3 RATIO (ref 0–3.2)
LYMPHOCYTES # BLD AUTO: 1.6 X10E3/UL (ref 0.7–3.1)
LYMPHOCYTES NFR BLD AUTO: 28 %
MCH RBC QN AUTO: 28 PG (ref 26.6–33)
MCHC RBC AUTO-ENTMCNC: 32.5 G/DL (ref 31.5–35.7)
MCV RBC AUTO: 86 FL (ref 79–97)
MONOCYTES # BLD AUTO: 0.3 X10E3/UL (ref 0.1–0.9)
MONOCYTES NFR BLD AUTO: 6 %
NEUTROPHILS # BLD AUTO: 3.8 X10E3/UL (ref 1.4–7)
NEUTROPHILS NFR BLD AUTO: 64 %
PLATELET # BLD AUTO: 213 X10E3/UL (ref 150–450)
POTASSIUM SERPL-SCNC: 3.9 MMOL/L (ref 3.5–5.2)
PROT SERPL-MCNC: 6.6 G/DL (ref 6–8.5)
PTH-INTACT SERPL-MCNC: 43 PG/ML (ref 15–65)
RBC # BLD AUTO: 4.35 X10E6/UL (ref 3.77–5.28)
SL AMB EGFR AFRICAN AMERICAN: 106 ML/MIN/1.73
SL AMB EGFR NON AFRICAN AMERICAN: 92 ML/MIN/1.73
SL AMB VLDL CHOLESTEROL CALC: 25 MG/DL (ref 5–40)
SODIUM SERPL-SCNC: 140 MMOL/L (ref 134–144)
TRIGL SERPL-MCNC: 123 MG/DL (ref 0–149)
TSH SERPL DL<=0.005 MIU/L-ACNC: 1.81 UIU/ML (ref 0.45–4.5)
WBC # BLD AUTO: 5.9 X10E3/UL (ref 3.4–10.8)

## 2019-11-15 ENCOUNTER — OFFICE VISIT (OUTPATIENT)
Dept: ENDOCRINOLOGY | Facility: HOSPITAL | Age: 64
End: 2019-11-15
Payer: COMMERCIAL

## 2019-11-15 VITALS
BODY MASS INDEX: 35.19 KG/M2 | SYSTOLIC BLOOD PRESSURE: 132 MMHG | DIASTOLIC BLOOD PRESSURE: 62 MMHG | WEIGHT: 211.2 LBS | HEIGHT: 65 IN | HEART RATE: 72 BPM

## 2019-11-15 DIAGNOSIS — I10 ESSENTIAL HYPERTENSION: ICD-10-CM

## 2019-11-15 DIAGNOSIS — E78.5 HYPERLIPIDEMIA, UNSPECIFIED HYPERLIPIDEMIA TYPE: ICD-10-CM

## 2019-11-15 DIAGNOSIS — E11.9 TYPE 2 DIABETES MELLITUS WITHOUT COMPLICATION, WITHOUT LONG-TERM CURRENT USE OF INSULIN (HCC): Primary | ICD-10-CM

## 2019-11-15 PROCEDURE — 99214 OFFICE O/P EST MOD 30 MIN: CPT | Performed by: INTERNAL MEDICINE

## 2019-11-15 NOTE — PROGRESS NOTES
11/15/2019    Assessment/Plan      Diagnoses and all orders for this visit:    Type 2 diabetes mellitus without complication, without long-term current use of insulin (HCC)  -     Hemoglobin A1C; Future  -     Comprehensive metabolic panel; Future  -     CBC and differential; Future  -     Microalbumin / creatinine urine ratio; Future  -     TSH, 3rd generation; Future    Essential hypertension    Hyperlipidemia, unspecified hyperlipidemia type  -     Lipid Panel with Direct LDL reflex; Future        Assessment/Plan:  1  Type 2 diabetes: Well controlled on current regimen  Follow-up in 6 months  She did some research and is thinking about doing intermittent fasting for weight loss  2  Hypertension:  Normotensive in the office today on current regimen  3  Hyperlipidemia:  Controlled on current regimen  4  History of mild hypercalcemia:  Recent labs have all been normal   We will monitor over time  CC: Diabetes Consult    History of Present Illness     HPI: Ugo Souza is a 71y o  year old female with type 2 diabetes for 5 years  She is on oral agents at home and takes metformin 1000 mg twice a day  She denies any polyuria, polydipsia, nocturia and blurry vision  She denies neuropathy, nephropathy and retinopathy  Hypoglycemic episodes: No      The patient's last eye exam was in Feb 2019  Blood Sugar/Glucometer/Pump/CGM review:  No logs to review today  For hyperlipidemia, continues on atorvastatin 20 mg daily  She supplements for vitamin-D deficiency with 2000 units a day  She is maintained on losartan 50 mg daily for hypertension  She did have some mild hypercalcemia in the past which we are monitoring  Review of Systems   Constitutional: Negative for fatigue  HENT: Negative for trouble swallowing and voice change  Eyes: Negative for visual disturbance  Respiratory: Negative for shortness of breath  Cardiovascular: Negative for palpitations and leg swelling  Gastrointestinal: Negative for abdominal pain, nausea and vomiting  Endocrine: Negative for polydipsia and polyuria  Musculoskeletal: Negative for arthralgias and myalgias  Skin: Negative for rash  Neurological: Negative for dizziness, tremors and weakness  Hematological: Negative for adenopathy  Psychiatric/Behavioral: Negative for agitation and confusion  Historical Information   Past Medical History:   Diagnosis Date    Arthritis     COPD [facility]chronic obstructive pulmonary disease) ([facility] )     Diabetes mellitus ([facility] )     GERD (gastroesophageal reflux disease)     Renal disorder      Past Surgical History:   Procedure Laterality Date     SECTION      RENAL CYST EXCISION       Social History   Social History     Substance and Sexual Activity   Alcohol Use No     Social History     Substance and Sexual Activity   Drug Use No     Social History     Tobacco Use   Smoking Status Former Smoker    Packs/day: 0 50    Types: Cigarettes    Last attempt to quit: 10/29/1981    Years since quittin 0   Smokeless Tobacco Never Used     Family History:   Family History   Problem Relation Age of Onset    COPD Mother     Lung cancer Mother     Lung cancer Father     Hypertension Father     No Known Problems Sister     Hypertension Brother     Kidney disease Brother     Stroke Brother     Kidney disease Brother     Hypertension Brother     Hyperlipidemia Brother     Gestational diabetes Sister        Meds/Allergies   Current Outpatient Medications   Medication Sig Dispense Refill    aspirin 81 mg chewable tablet Chew 81 mg every other day       atorvastatin (LIPITOR) 20 mg tablet 1 tab daily   90 tablet 3    cholecalciferol (VITAMIN D3) 1,000 units tablet Take 2,000 Units by mouth daily      escitalopram (LEXAPRO) 10 mg tablet Take 5 mg by mouth daily      Halobetasol-Ammonium Lactate 0 05 & 12 % (Cream) KIT Apply topically      losartan (COZAAR) 50 mg tablet Take 1 tablet (50 mg total) by mouth daily 90 tablet 3    metFORMIN (GLUCOPHAGE) 1000 MG tablet Take 1 tablet (1,000 mg total) by mouth 2 (two) times a day with meals 180 tablet 3    pantoprazole (PROTONIX) 40 mg tablet Take 40 mg by mouth daily      triamcinolone (KENALOG) 0 1 % cream APPLY TO AFFECTED AREA ON HANDS 2X/DAY FOR 14 DAYS THEN 2-3X/WEEK AS NEEDED FOR FLARES  AVOID FACE  2     No current facility-administered medications for this visit  Allergies   Allergen Reactions    Demerol [Meperidine] GI Intolerance     Other reaction(s): N/V    Morphine GI Intolerance     Other reaction(s): N/V       Objective   Vitals: Blood pressure 132/62, pulse 72, height 5' 5" (1 651 m), weight 95 8 kg (211 lb 3 2 oz)  Invasive Devices     None                 Physical Exam   Constitutional: She is oriented to person, place, and time  She appears well-developed and well-nourished  No distress  HENT:   Head: Normocephalic and atraumatic  Neck: Normal range of motion  Neck supple  No thyromegaly present  Cardiovascular: Normal rate and regular rhythm  Pulmonary/Chest: Effort normal and breath sounds normal  No respiratory distress  Abdominal: Soft  Bowel sounds are normal    Musculoskeletal: Normal range of motion  She exhibits no edema  Neurological: She is alert and oriented to person, place, and time  She exhibits normal muscle tone  Skin: Skin is warm and dry  No rash noted  She is not diaphoretic  Psychiatric: She has a normal mood and affect  Her behavior is normal    Vitals reviewed  The history was obtained from the review of the chart and from the patient      Lab Results:    Most recent Alc is  Lab Results   Component Value Date    HGBA1C 6 3 (H) 11/13/2019           No components found for: HA1C  No components found for: GLU    Lab Results   Component Value Date    CREATININE 0 69 11/13/2019    CREATININE 0 59 04/29/2019    CREATININE 0 69 02/19/2019    BUN 9 11/13/2019    K 3 9 11/13/2019     11/13/2019    CO2 25 11/13/2019     No results found for: EGFR  No components found for: [facility]    Lab Results   Component Value Date    HDL 37 (L) 11/13/2019    TRIG 123 11/13/2019       Lab Results   Component Value Date    ALT 18 11/13/2019    AST 19 11/13/2019       Lab Results   Component Value Date    TSH 1 810 11/13/2019       Recent Results (from the past 49465 hour(s))   Hemoglobin A1C    Collection Time: 02/15/19 12:00 AM   Result Value Ref Range    Hemoglobin A1C 6 3    Microalbumin / creatinine urine ratio    Collection Time: 02/15/19 12:00 AM   Result Value Ref Range    EXT Creatinine Urine 82 0     Microalbum  ,U,Random 13 1     EXTERNAL Microalb/Creat Ratio 16 0    Comprehensive metabolic panel    Collection Time: 02/19/19  9:56 AM   Result Value Ref Range    Glucose, Random 135 (H) 65 - 99 mg/dL    BUN 12 8 - 27 mg/dL    Creatinine 0 69 0 57 - 1 00 mg/dL    eGFR Non  92 >59 mL/min/1 73    eGFR  106 >59 mL/min/1 73    SL AMB BUN/CREATININE RATIO 17 12 - 28    Sodium 140 134 - 144 mmol/L    Potassium 4 5 3 5 - 5 2 mmol/L    Chloride 103 96 - 106 mmol/L    CO2 26 20 - 29 mmol/L    CALCIUM 10 6 (H) 8 7 - 10 3 mg/dL    Protein, Total 6 7 6 0 - 8 5 g/dL    Albumin 4 3 3 6 - 4 8 g/dL    Globulin, Total 2 4 1 5 - 4 5 g/dL    Albumin/Globulin Ratio 1 8 1 2 - 2 2    TOTAL BILIRUBIN 0 8 0 0 - 1 2 mg/dL    Alk Phos Isoenzymes 96 39 - 117 IU/L    AST 15 0 - 40 IU/L    ALT 21 0 - 32 IU/L   VITAMIN D,25-HYDROXY, SERUM    Collection Time: 02/19/19  9:56 AM   Result Value Ref Range    Vit D, 1,25-Dihydroxy 40 1 19 9 - 79 3 pg/mL    25-HYDROXY VIT D 49 2 30 0 - 100 0 ng/mL   Hemoglobin A1c (w/out EAG)    Collection Time: 02/19/19  9:56 AM   Result Value Ref Range    Hemoglobin A1C 6 3 (H) 4 8 - 5 6 %   Phosphorus    Collection Time: 02/19/19  9:56 AM   Result Value Ref Range    Phosphorus, Serum 2 9 2 5 - 4 5 mg/dL   Calcium, ionized    Collection Time: 02/19/19  9:56 AM   Result Value Ref Range    Calcium, Ionized, Serum 5 9 (H) 4 5 - 5 6 mg/dL   PTH, intact    Collection Time: 02/19/19  9:56 AM   Result Value Ref Range    PTH, Intact 48 15 - 65 pg/mL   Specimen Status Report    Collection Time: 02/19/19  9:56 AM   Result Value Ref Range    Comment Comment    Hm Diabetes Eye Exam    Collection Time: 02/26/19 10:45 AM   Result Value Ref Range    Right Eye Diabetic Retinopathy None     Left Eye Diabetic Retinopathy None    Creatinine, urine, 24 hour    Collection Time: 03/11/19  9:00 AM   Result Value Ref Range    Creatinine, Urine 68 6 Not Estab  mg/dL    Creatinine, 24H Ur 1,098 800 - 1,800 mg/24 hr   Calcium, urine, 24 hour    Collection Time: 03/11/19  9:00 AM   Result Value Ref Range    Calcium, Ur 13 6 Not Estab  mg/dL    Calcium 24HR Ur 217 6 100 0 - 300 0 mg/24 hr   Comprehensive metabolic panel    Collection Time: 04/29/19  1:20 PM   Result Value Ref Range    Glucose, Random 117 (H) 65 - 99 mg/dL    BUN 11 8 - 27 mg/dL    Creatinine 0 59 0 57 - 1 00 mg/dL    eGFR Non African American 97 >59 mL/min/1 73    eGFR  112 >59 mL/min/1 73    SL AMB BUN/CREATININE RATIO 19 12 - 28    Sodium 140 134 - 144 mmol/L    Potassium 4 1 3 5 - 5 2 mmol/L    Chloride 100 96 - 106 mmol/L    CO2 26 20 - 29 mmol/L    CALCIUM 10 3 8 7 - 10 3 mg/dL    Protein, Total 6 8 6 0 - 8 5 g/dL    Albumin 4 3 3 6 - 4 8 g/dL    Globulin, Total 2 5 1 5 - 4 5 g/dL    Albumin/Globulin Ratio 1 7 1 2 - 2 2    TOTAL BILIRUBIN 0 6 0 0 - 1 2 mg/dL    Alk Phos Isoenzymes 102 39 - 117 IU/L    AST 18 0 - 40 IU/L    ALT 22 0 - 32 IU/L   Lipid Panel with Direct LDL reflex    Collection Time: 04/29/19  1:20 PM   Result Value Ref Range    Cholesterol, Total 123 100 - 199 mg/dL    Triglycerides 95 0 - 149 mg/dL    HDL 39 (L) >39 mg/dL    VLDL Cholesterol Calculated 19 5 - 40 mg/dL    LDL Direct 65 0 - 99 mg/dL    LDl/HDL Ratio 1 7 0 0 - 3 2 ratio   Microalbumin / creatinine urine ratio    Collection Time: 04/29/19  1:20 PM Result Value Ref Range    Creatinine, Urine 54 5 Not Estab  mg/dL    Microalbum  ,U,Random 13 2 Not Estab  ug/mL    Microalb/Creat Ratio 24 2 0 0 - 30 0 mg/g creat   Hemoglobin A1C    Collection Time: 04/29/19  1:20 PM   Result Value Ref Range    Hemoglobin A1C 6 5 (H) 4 8 - 5 6 %    Estimated Average Glucose 140 mg/dL   Vitamin D 25 hydroxy    Collection Time: 04/29/19  1:20 PM   Result Value Ref Range    25-HYDROXY VIT D 51 5 30 0 - 100 0 ng/mL   PTH, intact    Collection Time: 04/29/19  1:20 PM   Result Value Ref Range    PTH, Intact 49 15 - 65 pg/mL   Specimen Status Report    Collection Time: 04/29/19  1:20 PM   Result Value Ref Range    Comment Comment    Tadeo Fairbanks Default    Collection Time: 11/13/19  9:24 AM   Result Value Ref Range    White Blood Cell Count 5 9 3 4 - 10 8 x10E3/uL    Red Blood Cell Count 4 35 3 77 - 5 28 x10E6/uL    Hemoglobin 12 2 11 1 - 15 9 g/dL    HCT 37 5 34 0 - 46 6 %    MCV 86 79 - 97 fL    MCH 28 0 26 6 - 33 0 pg    MCHC 32 5 31 5 - 35 7 g/dL    RDW 13 5 12 3 - 15 4 %    Platelet Count 542 075 - 450 x10E3/uL    Neutrophils 64 Not Estab  %    Lymphocytes 28 Not Estab  %    Monocytes 6 Not Estab  %    Eosinophils 2 Not Estab  %    Basophils PCT 0 Not Estab  %    Neutrophils (Absolute) 3 8 1 4 - 7 0 x10E3/uL    Lymphocytes (Absolute) 1 6 0 7 - 3 1 x10E3/uL    Monocytes (Absolute) 0 3 0 1 - 0 9 x10E3/uL    Eosinophils (Absolute) 0 1 0 0 - 0 4 x10E3/uL    Basophils ABS 0 0 0 0 - 0 2 x10E3/uL    Immature Granulocytes 0 Not Estab  %    Immature Granulocytes (Absolute) 0 0 0 0 - 0 1 x10E3/uL   Comprehensive metabolic panel    Collection Time: 11/13/19  9:24 AM   Result Value Ref Range    Glucose, Random 143 (H) 65 - 99 mg/dL    BUN 9 8 - 27 mg/dL    Creatinine 0 69 0 57 - 1 00 mg/dL    eGFR Non  92 >59 mL/min/1 73    eGFR  106 >59 mL/min/1 73    SL AMB BUN/CREATININE RATIO 13 12 - 28    Sodium 140 134 - 144 mmol/L    Potassium 3 9 3 5 - 5 2 mmol/L Chloride 100 96 - 106 mmol/L    CO2 25 20 - 29 mmol/L    CALCIUM 10 1 8 7 - 10 3 mg/dL    Protein, Total 6 6 6 0 - 8 5 g/dL    Albumin 4 2 3 6 - 4 8 g/dL    Globulin, Total 2 4 1 5 - 4 5 g/dL    Albumin/Globulin Ratio 1 8 1 2 - 2 2    TOTAL BILIRUBIN 0 7 0 0 - 1 2 mg/dL    Alk Phos Isoenzymes 100 39 - 117 IU/L    AST 19 0 - 40 IU/L    ALT 18 0 - 32 IU/L   Lipid Panel with Direct LDL reflex    Collection Time: 11/13/19  9:24 AM   Result Value Ref Range    Cholesterol, Total 110 100 - 199 mg/dL    Triglycerides 123 0 - 149 mg/dL    HDL 37 (L) >39 mg/dL    VLDL Cholesterol Calculated 25 5 - 40 mg/dL    LDL Direct 48 0 - 99 mg/dL    LDl/HDL Ratio 1 3 0 0 - 3 2 ratio   Hemoglobin A1C    Collection Time: 11/13/19  9:24 AM   Result Value Ref Range    Hemoglobin A1C 6 3 (H) 4 8 - 5 6 %    Estimated Average Glucose 134 mg/dL   TSH, 3rd generation    Collection Time: 11/13/19  9:24 AM   Result Value Ref Range    TSH 1 810 0 450 - 4 500 uIU/mL   Vitamin D 25 hydroxy    Collection Time: 11/13/19  9:24 AM   Result Value Ref Range    25-HYDROXY VIT D 49 9 30 0 - 100 0 ng/mL   PTH, intact    Collection Time: 11/13/19  9:24 AM   Result Value Ref Range    PTH, Intact 43 15 - 65 pg/mL         No future appointments  Portions of the record may have been created with voice recognition software  Occasional wrong word or "sound a like" substitutions may have occurred due to the inherent limitations of voice recognition software  Read the chart carefully and recognize, using context, where substitutions have occurred

## 2020-05-08 LAB
CREAT ?TM UR-SCNC: 106 UMOL/L
EXT MICROALBUMIN URINE RANDOM: 19.7
HBA1C MFR BLD HPLC: 6.3 %
MICROALBUMIN/CREAT UR: 19 MG/G{CREAT}

## 2020-05-09 LAB
ALBUMIN SERPL-MCNC: 4.3 G/DL (ref 3.8–4.8)
ALBUMIN/CREAT UR: 18 MG/G CREAT (ref 0–29)
ALBUMIN/GLOB SERPL: 1.9 {RATIO} (ref 1.2–2.2)
ALP SERPL-CCNC: 91 IU/L (ref 39–117)
ALT SERPL-CCNC: 17 IU/L (ref 0–32)
AST SERPL-CCNC: 18 IU/L (ref 0–40)
BASOPHILS # BLD AUTO: 0.1 X10E3/UL (ref 0–0.2)
BASOPHILS NFR BLD AUTO: 1 %
BILIRUB SERPL-MCNC: 0.6 MG/DL (ref 0–1.2)
BUN SERPL-MCNC: 12 MG/DL (ref 8–27)
BUN/CREAT SERPL: 18 (ref 12–28)
CALCIUM SERPL-MCNC: 10.3 MG/DL (ref 8.7–10.3)
CHLORIDE SERPL-SCNC: 101 MMOL/L (ref 96–106)
CHOLEST SERPL-MCNC: 116 MG/DL (ref 100–199)
CO2 SERPL-SCNC: 24 MMOL/L (ref 20–29)
CREAT SERPL-MCNC: 0.65 MG/DL (ref 0.57–1)
CREAT UR-MCNC: 106.9 MG/DL
EOSINOPHIL # BLD AUTO: 0.1 X10E3/UL (ref 0–0.4)
EOSINOPHIL NFR BLD AUTO: 2 %
ERYTHROCYTE [DISTWIDTH] IN BLOOD BY AUTOMATED COUNT: 13 % (ref 11.7–15.4)
GLOBULIN SER-MCNC: 2.3 G/DL (ref 1.5–4.5)
GLUCOSE SERPL-MCNC: 126 MG/DL (ref 65–99)
HBA1C MFR BLD: 6.6 % (ref 4.8–5.6)
HCT VFR BLD AUTO: 36 % (ref 34–46.6)
HDLC SERPL-MCNC: 41 MG/DL
HGB BLD-MCNC: 11.9 G/DL (ref 11.1–15.9)
IMM GRANULOCYTES # BLD: 0 X10E3/UL (ref 0–0.1)
IMM GRANULOCYTES NFR BLD: 0 %
LDLC SERPL CALC-MCNC: 53 MG/DL (ref 0–99)
LDLC/HDLC SERPL: 1.3 RATIO (ref 0–3.2)
LYMPHOCYTES # BLD AUTO: 1.9 X10E3/UL (ref 0.7–3.1)
LYMPHOCYTES NFR BLD AUTO: 30 %
MCH RBC QN AUTO: 28.8 PG (ref 26.6–33)
MCHC RBC AUTO-ENTMCNC: 33.1 G/DL (ref 31.5–35.7)
MCV RBC AUTO: 87 FL (ref 79–97)
MICROALBUMIN UR-MCNC: 19.6 UG/ML
MONOCYTES # BLD AUTO: 0.4 X10E3/UL (ref 0.1–0.9)
MONOCYTES NFR BLD AUTO: 6 %
NEUTROPHILS # BLD AUTO: 3.8 X10E3/UL (ref 1.4–7)
NEUTROPHILS NFR BLD AUTO: 61 %
PLATELET # BLD AUTO: 210 X10E3/UL (ref 150–450)
POTASSIUM SERPL-SCNC: 4.2 MMOL/L (ref 3.5–5.2)
PROT SERPL-MCNC: 6.6 G/DL (ref 6–8.5)
RBC # BLD AUTO: 4.13 X10E6/UL (ref 3.77–5.28)
SL AMB EGFR AFRICAN AMERICAN: 108 ML/MIN/1.73
SL AMB EGFR NON AFRICAN AMERICAN: 93 ML/MIN/1.73
SL AMB VLDL CHOLESTEROL CALC: 22 MG/DL (ref 5–40)
SODIUM SERPL-SCNC: 141 MMOL/L (ref 134–144)
TRIGL SERPL-MCNC: 111 MG/DL (ref 0–149)
TSH SERPL DL<=0.005 MIU/L-ACNC: 1.49 UIU/ML (ref 0.45–4.5)
WBC # BLD AUTO: 6.3 X10E3/UL (ref 3.4–10.8)

## 2020-05-26 ENCOUNTER — TELEPHONE (OUTPATIENT)
Dept: ENDOCRINOLOGY | Facility: HOSPITAL | Age: 65
End: 2020-05-26

## 2020-05-27 ENCOUNTER — OFFICE VISIT (OUTPATIENT)
Dept: ENDOCRINOLOGY | Facility: HOSPITAL | Age: 65
End: 2020-05-27
Payer: COMMERCIAL

## 2020-05-27 VITALS
BODY MASS INDEX: 35.02 KG/M2 | DIASTOLIC BLOOD PRESSURE: 62 MMHG | TEMPERATURE: 98.2 F | SYSTOLIC BLOOD PRESSURE: 128 MMHG | HEART RATE: 80 BPM | WEIGHT: 210.2 LBS | HEIGHT: 65 IN

## 2020-05-27 DIAGNOSIS — E11.9 TYPE 2 DIABETES MELLITUS WITHOUT COMPLICATION, WITHOUT LONG-TERM CURRENT USE OF INSULIN (HCC): Primary | ICD-10-CM

## 2020-05-27 DIAGNOSIS — I10 ESSENTIAL HYPERTENSION: ICD-10-CM

## 2020-05-27 DIAGNOSIS — L30.9 ECZEMA, UNSPECIFIED TYPE: ICD-10-CM

## 2020-05-27 DIAGNOSIS — E78.5 HYPERLIPIDEMIA, UNSPECIFIED HYPERLIPIDEMIA TYPE: ICD-10-CM

## 2020-05-27 DIAGNOSIS — E83.52 HYPERCALCEMIA: ICD-10-CM

## 2020-05-27 PROCEDURE — 99214 OFFICE O/P EST MOD 30 MIN: CPT | Performed by: INTERNAL MEDICINE

## 2020-05-27 RX ORDER — SIMVASTATIN 40 MG
40 TABLET ORAL EVERY EVENING
COMMUNITY
Start: 2020-05-09

## 2020-05-27 RX ORDER — MULTIVIT WITH MINERALS/LUTEIN
125 TABLET ORAL DAILY
COMMUNITY

## 2020-05-27 RX ORDER — HALOBETASOL PROPIONATE 0.05 %
OINTMENT (GRAM) TOPICAL
Qty: 1 TUBE | Refills: 0 | Status: SHIPPED | OUTPATIENT
Start: 2020-05-27

## 2020-06-04 DIAGNOSIS — I10 ESSENTIAL HYPERTENSION: ICD-10-CM

## 2020-06-04 DIAGNOSIS — E11.9 TYPE 2 DIABETES MELLITUS WITHOUT COMPLICATION, WITHOUT LONG-TERM CURRENT USE OF INSULIN (HCC): ICD-10-CM

## 2020-06-04 RX ORDER — LOSARTAN POTASSIUM 50 MG/1
50 TABLET ORAL DAILY
Qty: 90 TABLET | Refills: 1 | Status: SHIPPED | OUTPATIENT
Start: 2020-06-04 | End: 2020-12-15

## 2020-06-15 ENCOUNTER — APPOINTMENT (EMERGENCY)
Dept: CT IMAGING | Facility: HOSPITAL | Age: 65
End: 2020-06-15
Payer: COMMERCIAL

## 2020-06-15 ENCOUNTER — APPOINTMENT (EMERGENCY)
Dept: RADIOLOGY | Facility: HOSPITAL | Age: 65
End: 2020-06-15
Payer: COMMERCIAL

## 2020-06-15 ENCOUNTER — HOSPITAL ENCOUNTER (EMERGENCY)
Facility: HOSPITAL | Age: 65
Discharge: HOME | End: 2020-06-15
Attending: EMERGENCY MEDICINE | Admitting: EMERGENCY MEDICINE
Payer: COMMERCIAL

## 2020-06-15 VITALS
RESPIRATION RATE: 18 BRPM | BODY MASS INDEX: 34.66 KG/M2 | SYSTOLIC BLOOD PRESSURE: 186 MMHG | TEMPERATURE: 97.8 F | OXYGEN SATURATION: 94 % | HEIGHT: 65 IN | HEART RATE: 80 BPM | WEIGHT: 208 LBS | DIASTOLIC BLOOD PRESSURE: 84 MMHG

## 2020-06-15 DIAGNOSIS — K20.90 ESOPHAGITIS: Primary | ICD-10-CM

## 2020-06-15 LAB
ALBUMIN SERPL BCP-MCNC: 4 G/DL (ref 3.5–5)
ALP SERPL-CCNC: 104 U/L (ref 46–116)
ALT SERPL W P-5'-P-CCNC: 28 U/L (ref 12–78)
ANION GAP SERPL CALCULATED.3IONS-SCNC: 7 MMOL/L (ref 4–13)
AST SERPL W P-5'-P-CCNC: 15 U/L (ref 5–45)
ATRIAL RATE: 81 BPM
BASOPHILS # BLD AUTO: 0.02 THOUSANDS/ΜL (ref 0–0.1)
BASOPHILS NFR BLD AUTO: 0 % (ref 0–1)
BILIRUB SERPL-MCNC: 0.7 MG/DL (ref 0.2–1)
BUN SERPL-MCNC: 12 MG/DL (ref 5–25)
CALCIUM SERPL-MCNC: 10.2 MG/DL (ref 8.3–10.1)
CHLORIDE SERPL-SCNC: 105 MMOL/L (ref 100–108)
CO2 SERPL-SCNC: 30 MMOL/L (ref 21–32)
CREAT SERPL-MCNC: 0.76 MG/DL (ref 0.6–1.3)
D DIMER PPP FEU-MCNC: 2.21 UG/ML FEU
EOSINOPHIL # BLD AUTO: 0.11 THOUSAND/ΜL (ref 0–0.61)
EOSINOPHIL NFR BLD AUTO: 2 % (ref 0–6)
ERYTHROCYTE [DISTWIDTH] IN BLOOD BY AUTOMATED COUNT: 12.8 % (ref 11.6–15.1)
GFR SERPL CREATININE-BSD FRML MDRD: 83 ML/MIN/1.73SQ M
GLUCOSE SERPL-MCNC: 155 MG/DL (ref 65–140)
HCT VFR BLD AUTO: 38.8 % (ref 34.8–46.1)
HGB BLD-MCNC: 12.4 G/DL (ref 11.5–15.4)
IMM GRANULOCYTES # BLD AUTO: 0.03 THOUSAND/UL (ref 0–0.2)
IMM GRANULOCYTES NFR BLD AUTO: 0 % (ref 0–2)
LYMPHOCYTES # BLD AUTO: 1.86 THOUSANDS/ΜL (ref 0.6–4.47)
LYMPHOCYTES NFR BLD AUTO: 28 % (ref 14–44)
MCH RBC QN AUTO: 29.2 PG (ref 26.8–34.3)
MCHC RBC AUTO-ENTMCNC: 32 G/DL (ref 31.4–37.4)
MCV RBC AUTO: 91 FL (ref 82–98)
MONOCYTES # BLD AUTO: 0.39 THOUSAND/ΜL (ref 0.17–1.22)
MONOCYTES NFR BLD AUTO: 6 % (ref 4–12)
NEUTROPHILS # BLD AUTO: 4.28 THOUSANDS/ΜL (ref 1.85–7.62)
NEUTS SEG NFR BLD AUTO: 64 % (ref 43–75)
NRBC BLD AUTO-RTO: 0 /100 WBCS
P AXIS: 67 DEGREES
PLATELET # BLD AUTO: 202 THOUSANDS/UL (ref 149–390)
PMV BLD AUTO: 10 FL (ref 8.9–12.7)
POTASSIUM SERPL-SCNC: 3.8 MMOL/L (ref 3.5–5.3)
PR INTERVAL: 178 MS
PROT SERPL-MCNC: 7.9 G/DL (ref 6.4–8.2)
QRS AXIS: 53 DEGREES
QRSD INTERVAL: 78 MS
QT INTERVAL: 356 MS
QTC INTERVAL: 413 MS
RBC # BLD AUTO: 4.25 MILLION/UL (ref 3.81–5.12)
SODIUM SERPL-SCNC: 142 MMOL/L (ref 136–145)
T WAVE AXIS: 59 DEGREES
TROPONIN I SERPL-MCNC: <0.02 NG/ML
VENTRICULAR RATE: 81 BPM
WBC # BLD AUTO: 6.69 THOUSAND/UL (ref 4.31–10.16)

## 2020-06-15 PROCEDURE — 80053 COMPREHEN METABOLIC PANEL: CPT | Performed by: PHYSICIAN ASSISTANT

## 2020-06-15 PROCEDURE — 36415 COLL VENOUS BLD VENIPUNCTURE: CPT | Performed by: PHYSICIAN ASSISTANT

## 2020-06-15 PROCEDURE — 71275 CT ANGIOGRAPHY CHEST: CPT

## 2020-06-15 PROCEDURE — 85379 FIBRIN DEGRADATION QUANT: CPT | Performed by: PHYSICIAN ASSISTANT

## 2020-06-15 PROCEDURE — 85025 COMPLETE CBC W/AUTO DIFF WBC: CPT | Performed by: PHYSICIAN ASSISTANT

## 2020-06-15 PROCEDURE — 84484 ASSAY OF TROPONIN QUANT: CPT | Performed by: PHYSICIAN ASSISTANT

## 2020-06-15 PROCEDURE — 93010 ELECTROCARDIOGRAM REPORT: CPT | Performed by: INTERNAL MEDICINE

## 2020-06-15 PROCEDURE — 71046 X-RAY EXAM CHEST 2 VIEWS: CPT

## 2020-06-15 PROCEDURE — 93005 ELECTROCARDIOGRAM TRACING: CPT

## 2020-06-15 PROCEDURE — 99285 EMERGENCY DEPT VISIT HI MDM: CPT

## 2020-06-15 PROCEDURE — 99284 EMERGENCY DEPT VISIT MOD MDM: CPT | Performed by: PHYSICIAN ASSISTANT

## 2020-06-15 RX ORDER — PANTOPRAZOLE SODIUM 40 MG/1
40 TABLET, DELAYED RELEASE ORAL 2 TIMES DAILY
Qty: 28 TABLET | Refills: 0 | Status: SHIPPED | OUTPATIENT
Start: 2020-06-15 | End: 2020-07-02 | Stop reason: SDUPTHER

## 2020-06-15 RX ADMIN — IOHEXOL 85 ML: 350 INJECTION, SOLUTION INTRAVENOUS at 14:01

## 2020-06-24 ENCOUNTER — OFFICE VISIT (OUTPATIENT)
Dept: GASTROENTEROLOGY | Facility: CLINIC | Age: 65
End: 2020-06-24
Payer: COMMERCIAL

## 2020-06-24 VITALS
BODY MASS INDEX: 33.66 KG/M2 | HEART RATE: 72 BPM | SYSTOLIC BLOOD PRESSURE: 154 MMHG | TEMPERATURE: 97.5 F | WEIGHT: 202 LBS | HEIGHT: 65 IN | DIASTOLIC BLOOD PRESSURE: 86 MMHG

## 2020-06-24 DIAGNOSIS — K21.00 GASTROESOPHAGEAL REFLUX DISEASE WITH ESOPHAGITIS: ICD-10-CM

## 2020-06-24 DIAGNOSIS — R07.89 CHEST PAIN, ATYPICAL: ICD-10-CM

## 2020-06-24 DIAGNOSIS — Z12.11 SCREENING FOR COLON CANCER: Primary | ICD-10-CM

## 2020-06-24 PROCEDURE — 99204 OFFICE O/P NEW MOD 45 MIN: CPT | Performed by: INTERNAL MEDICINE

## 2020-06-24 NOTE — H&P (VIEW-ONLY)
[id number] [facility] - Outpatient Consultation  Junior Natarajan 71 y o  female MRN: [id number]  Encounter: [id number]    ASSESSMENT AND PLAN:      1  Screening for colon cancer colonoscopy done elsewhere 2-3 years ago she was told this was okay    2  Chest pain, atypical  76-year-old female with several weeks of chest pain nonradiating not associated with exertion or the GI cycle  She has a history of esophageal reflux but has not noticed any difference since increasing her Protonix from 20 mg daily to 40 mg daily  She has an abnormal CT scan of the esophagus  The differential diagnosis for the pain includes acid reflux I guess it is also possible she could have achalasia  It is also possible that chest pain is unrelated to the findings of reflux or the CT findings of the abnormal esophagus  I would start with upper endoscopy  On questioning her she said she is a difficult intubation and was told that she should only be done at the hospital for this  Accordingly we will schedule it at [address] [place] [address]  Gastroesophageal reflux disease with esophagitis  History of this see above      Followup Appointment:  6 weeks  ______________________________________________________________________    Chief Complaint   Patient presents with    Esophagitis     [facility] ER follow up       HPI:   Junior Natarajan is a 71y o  year old female who presents with chest pain that began several weeks ago  This occurred at rest there was no exertional component she did have an ER evaluation which was unremarkable except for a high D-dimer which prompted his CT a to rule out pulmonary embolism  This was negative for pulmonary embolism but did show a dilated esophagus with question of esophagitis and reflux  On questioning she has chronic dysphagia for solids and liquids  This is not changed appreciably no nausea or vomiting no weight loss no lower tract symptoms      Historical Information   Past Medical History:   Diagnosis Date    Arthritis     COPD (chronic obstructive pulmonary disease) ([address]     Diabetes mellitus (HCC)     GERD (gastroesophageal reflux disease)     Renal disorder     Sleep apnea      Past Surgical History:   Procedure Laterality Date     SECTION      RENAL CYST EXCISION      TONSILLECTOMY       Social History     Substance and Sexual Activity   Alcohol Use No     Social History     Substance and Sexual Activity   Drug Use No     Social History     Tobacco Use   Smoking Status Former Smoker    Packs/day: 0 50    Types: Cigarettes    Last attempt to quit: 10/29/1981    Years since quittin 6   Smokeless Tobacco Never Used     Family History   Problem Relation Age of Onset    COPD Mother     Lung cancer Mother     Lung cancer Father     Hypertension Father     No Known Problems Sister     Hypertension Brother     Colon polyps Brother     Kidney disease Brother     Colon polyps Brother     Stroke Brother     Kidney disease Brother     Hypertension Brother     Hyperlipidemia Brother     Gestational diabetes Sister     Colon cancer Maternal Aunt        Meds/Allergies     Current Outpatient Medications:     ascorbic acid (VITAMIN C) 250 mg tablet    aspirin 81 mg chewable tablet    cholecalciferol (VITAMIN D3) 1,000 units tablet    escitalopram (LEXAPRO) 10 mg tablet    halobetasol (ULTRAVATE) 0 05 % ointment    losartan (COZAAR) 50 mg tablet    metFORMIN (GLUCOPHAGE) 1000 MG tablet    Multiple Vitamins-Minerals (AIRBORNE GUMMIES PO)    pantoprazole (PROTONIX) 40 mg tablet    Polyethyl Glycol-Propyl Glycol (Systane) 0 4-0 3 % GEL    simvastatin (ZOCOR) 40 mg tablet    Allergies   Allergen Reactions    Demerol [Meperidine] GI Intolerance     Other reaction(s): N/V    Morphine GI Intolerance     Other reaction(s): N/V       PHYSICAL EXAM:    Blood pressure 154/86, pulse 72, temperature 97 5 °F (36 4 °C), height 5' 5" (1 651 m), weight 91 6 kg (202 lb)  Body mass index is 33 61 kg/m²  General Appearance: NAD, cooperative, alert  Eyes: Anicteric, PERRLA, EOMI  ENT:  Normocephalic, atraumatic, normal mucosa  Neck:  Supple, symmetrical, trachea midline,   Resp:  Clear to auscultation bilaterally; no rales, rhonchi or wheezing; respirations unlabored   CV:  S1 S2, Regular rate and rhythm; no murmur, rub, or gallop  GI:  Soft, non-tender, non-distended; normal bowel sounds; no masses, no organomegaly   Rectal: Deferred  Musculoskeletal: No cyanosis, clubbing or edema  Normal ROM  Skin:  No jaundice, rashes, or lesions   Heme/Lymph: No palpable cervical lymphadenopathy  Psych: Normal affect, good eye contact  Neuro: No gross deficits, AAOx3    Lab Results:   Lab Results   Component Value Date    WBC 6 69 06/15/2020    HGB 12 4 06/15/2020    HCT 38 8 06/15/2020    MCV 91 06/15/2020     06/15/2020     Lab Results   Component Value Date    K 3 8 06/15/2020     06/15/2020    CO2 30 06/15/2020    BUN 12 06/15/2020    CREATININE 0 76 06/15/2020    CALCIUM 10 2 (H) 06/15/2020    AST 15 06/15/2020    ALT 28 06/15/2020    ALKPHOS 104 06/15/2020    EGFR 83 06/15/2020     No results found for: IRON, TIBC, FERRITIN  No results found for: LIPASE    Radiology Results:   Xr Chest 2 Views    Result Date: 6/15/2020  Narrative: CHEST INDICATION:   chest pain  COMPARISON:  None EXAM PERFORMED/VIEWS:  XR CHEST PA & LATERAL FINDINGS: Cardiomediastinal silhouette appears unremarkable  The lungs are clear  No pneumothorax or pleural effusion  Osseous structures appear within normal limits for patient age  Impression: No acute cardiopulmonary disease  Workstation performed: [id number]     Cta Ed Chest Pe Study    Result Date: 6/15/2020  Narrative: CTA - CHEST WITH IV CONTRAST - PULMONARY ANGIOGRAM INDICATION:   pleuritic chest pain positive dimer   COMPARISON: 6/15/2020 chest x-ray TECHNIQUE: CTA examination of the chest was performed using angiographic technique according to a protocol specifically tailored to evaluate for pulmonary embolism  Axial, sagittal, and coronal 2D reformatted images were created from the source data and  submitted for interpretation  In addition, coronal 3D MIP postprocessing was performed on the acquisition scanner  Radiation dose length product (DLP) for this visit:  631 78 mGy-cm   This examination, like all CT scans performed in the [facility], was performed utilizing techniques to minimize radiation dose exposure, including the use of iterative  reconstruction and automated exposure control  IV Contrast:  85 mL of iohexol (OMNIPAQUE)  FINDINGS: PULMONARY ARTERIAL TREE:  No pulmonary embolus is seen  LUNGS:  Lungs are clear  There is no tracheal or endobronchial lesion  PLEURA:  Unremarkable  HEART/GREAT VESSELS:  Unremarkable for patient's age  MEDIASTINUM AND EMILIA:  Dilated esophagus with wall thickening suspicious for reflux esophagitis  CHEST WALL AND LOWER NECK:   Unremarkable  VISUALIZED STRUCTURES IN THE UPPER ABDOMEN:  Multiple large right renal cysts  Small left renal cyst  OSSEOUS STRUCTURES:  No acute fracture or destructive osseous lesion  Impression: No evidence of pulmonary embolism Dilated esophagus with wall thickening suspicious for reflux esophagitis Bilateral renal cysts Clear lungs, consistent with chest x-ray Workstation performed: [id number]         REVIEW OF SYSTEMS:    CONSTITUTIONAL: Denies any fever, chills, rigors, and weight loss  HEENT: No earache or tinnitus  Denies hearing loss or visual disturbances  CARDIOVASCULAR: No chest pain or palpitations  RESPIRATORY: Denies any cough, hemoptysis, shortness of breath or dyspnea on exertion  GASTROINTESTINAL: As noted in the History of Present Illness  GENITOURINARY: No problems with urination  Denies any hematuria or dysuria  NEUROLOGIC: No dizziness or vertigo, denies headaches  MUSCULOSKELETAL: Denies any muscle or joint pain  SKIN: Denies skin rashes or itching  ENDOCRINE: Denies excessive thirst  Denies intolerance to heat or cold  PSYCHOSOCIAL: Denies depression or anxiety  Denies any recent memory loss

## 2020-06-26 ENCOUNTER — TELEPHONE (OUTPATIENT)
Dept: ENDOCRINOLOGY | Facility: HOSPITAL | Age: 65
End: 2020-06-26

## 2020-06-26 DIAGNOSIS — Z11.59 SCREENING FOR VIRAL DISEASE: ICD-10-CM

## 2020-06-26 DIAGNOSIS — E11.9 TYPE 2 DIABETES MELLITUS WITHOUT COMPLICATION, WITHOUT LONG-TERM CURRENT USE OF INSULIN (HCC): ICD-10-CM

## 2020-06-26 DIAGNOSIS — E11.9 TYPE 2 DIABETES MELLITUS WITHOUT COMPLICATION, WITHOUT LONG-TERM CURRENT USE OF INSULIN (HCC): Primary | ICD-10-CM

## 2020-06-26 PROCEDURE — U0003 INFECTIOUS AGENT DETECTION BY NUCLEIC ACID (DNA OR RNA); SEVERE ACUTE RESPIRATORY SYNDROME CORONAVIRUS 2 (SARS-COV-2) (CORONAVIRUS DISEASE [COVID-19]), AMPLIFIED PROBE TECHNIQUE, MAKING USE OF HIGH THROUGHPUT TECHNOLOGIES AS DESCRIBED BY CMS-2020-01-R: HCPCS

## 2020-06-28 LAB — SARS-COV-2 RNA SPEC QL NAA+PROBE: NOT DETECTED

## 2020-07-01 ENCOUNTER — HOSPITAL ENCOUNTER (OUTPATIENT)
Dept: GASTROENTEROLOGY | Facility: HOSPITAL | Age: 65
Setting detail: OUTPATIENT SURGERY
Discharge: HOME | End: 2020-07-01
Attending: INTERNAL MEDICINE | Admitting: INTERNAL MEDICINE
Payer: COMMERCIAL

## 2020-07-01 ENCOUNTER — ANESTHESIA EVENT (OUTPATIENT)
Dept: GASTROENTEROLOGY | Facility: HOSPITAL | Age: 65
End: 2020-07-01

## 2020-07-01 ENCOUNTER — ANESTHESIA (OUTPATIENT)
Dept: GASTROENTEROLOGY | Facility: HOSPITAL | Age: 65
End: 2020-07-01

## 2020-07-01 VITALS
RESPIRATION RATE: 16 BRPM | SYSTOLIC BLOOD PRESSURE: 120 MMHG | HEIGHT: 65 IN | HEART RATE: 57 BPM | WEIGHT: 203 LBS | DIASTOLIC BLOOD PRESSURE: 67 MMHG | BODY MASS INDEX: 33.82 KG/M2 | OXYGEN SATURATION: 96 % | TEMPERATURE: 97.8 F

## 2020-07-01 DIAGNOSIS — R07.89 CHEST PAIN, ATYPICAL: ICD-10-CM

## 2020-07-01 DIAGNOSIS — K21.00 GASTROESOPHAGEAL REFLUX DISEASE WITH ESOPHAGITIS: ICD-10-CM

## 2020-07-01 LAB — GLUCOSE SERPL-MCNC: 122 MG/DL (ref 65–140)

## 2020-07-01 PROCEDURE — 82948 REAGENT STRIP/BLOOD GLUCOSE: CPT

## 2020-07-01 PROCEDURE — 43235 EGD DIAGNOSTIC BRUSH WASH: CPT | Performed by: INTERNAL MEDICINE

## 2020-07-01 RX ORDER — SODIUM CHLORIDE 9 MG/ML
75 INJECTION, SOLUTION INTRAVENOUS CONTINUOUS
Status: DISCONTINUED | OUTPATIENT
Start: 2020-07-01 | End: 2020-07-05 | Stop reason: HOSPADM

## 2020-07-01 RX ORDER — PROPOFOL 10 MG/ML
INJECTION, EMULSION INTRAVENOUS AS NEEDED
Status: DISCONTINUED | OUTPATIENT
Start: 2020-07-01 | End: 2020-07-01 | Stop reason: SURG

## 2020-07-01 RX ORDER — MIDAZOLAM HYDROCHLORIDE 2 MG/2ML
INJECTION, SOLUTION INTRAMUSCULAR; INTRAVENOUS AS NEEDED
Status: DISCONTINUED | OUTPATIENT
Start: 2020-07-01 | End: 2020-07-01 | Stop reason: SURG

## 2020-07-01 RX ADMIN — SODIUM CHLORIDE 75 ML/HR: 0.9 INJECTION, SOLUTION INTRAVENOUS at 07:24

## 2020-07-01 RX ADMIN — PROPOFOL 200 MG: 10 INJECTION, EMULSION INTRAVENOUS at 07:57

## 2020-07-01 RX ADMIN — SODIUM CHLORIDE: 0.9 INJECTION, SOLUTION INTRAVENOUS at 07:46

## 2020-07-01 RX ADMIN — MIDAZOLAM 2 MG: 1 INJECTION INTRAMUSCULAR; INTRAVENOUS at 07:56

## 2020-07-01 NOTE — INTERVAL H&P NOTE
H&P reviewed  After examining the patient I find no changes in the patients condition since the H&P had been written      Vitals:    07/01/20 0705   BP: 153/72   Pulse: 62   Resp: 18   Temp: 97 8 °F (36 6 °C)   SpO2: 94%

## 2020-07-01 NOTE — ANESTHESIA POSTPROCEDURE EVALUATION
Post-Op Assessment Note    CV Status:  Stable  Pain Score: 0    Pain management: adequate     Mental Status:  Alert and awake   Hydration Status:  Euvolemic   PONV Controlled:  Controlled   Airway Patency:  Patent   Post Op Vitals Reviewed: Yes      Staff: Anesthesiologist           /54 (07/01/20 0812)    Temp      Pulse 66 (07/01/20 0812)   Resp 16 (07/01/20 0812)    SpO2 94 % (07/01/20 0812)

## 2020-07-01 NOTE — ANESTHESIA PREPROCEDURE EVALUATION
Review of Systems/Medical History  Patient summary reviewed  Chart reviewed  History of anesthetic complications difficult airway    Cardiovascular  No pacemaker/AICD, No hypertension , No valvular heart disease , No valve replacement , No valve repair ,    Pulmonary  Smoker , COPD , Sleep apnea ,        GI/Hepatic    GERD ,             Endo/Other  Diabetes ,      GYN  Negative gynecology ROS          Hematology  No anemia ,  No coagulation disorder , No thrombocytopenia, No hypercoagulable state,    Musculoskeletal  No muscular dystrophy , No brief paralysis, No myopathy , No myasthenia gravis,   Arthritis     Neurology  Negative neurology ROS No seizures ,  No neuromuscular disease , No TIA, No CVA ,    Psychology   Negative psychology ROS No psychiatric history, No anxiety, No depression , No schizophrenia  No chronic opioid dependence            Physical Exam    Airway    Mallampati score: IV  TM Distance: >3 FB  Neck ROM: full     Dental   No notable dental hx     Cardiovascular  Rhythm: regular, Rate: normal, Cardiovascular exam normal    Pulmonary  Pulmonary exam normal     Other Findings        Anesthesia Plan  ASA Score- 3     Anesthesia Type- IV sedation with anesthesia with ASA Monitors  Additional Monitors:   Airway Plan:     Comment: Pt ho difficult airway  SMALL VERY SMALL MOUTH OPENING    Case was challenging to sedate during egd 7/1/20  Pt did fine  Oral nasal trumpet used  Plan Factors-    Induction-     Postoperative Plan- Plan for postoperative opioid use  Informed Consent- Anesthetic plan and risks discussed with patient

## 2020-07-02 DIAGNOSIS — K20.90 ESOPHAGITIS: ICD-10-CM

## 2020-07-02 RX ORDER — PANTOPRAZOLE SODIUM 40 MG/1
TABLET, DELAYED RELEASE ORAL
Qty: 60 TABLET | Refills: 2 | Status: SHIPPED | OUTPATIENT
Start: 2020-07-02 | End: 2020-10-02

## 2020-07-02 NOTE — TELEPHONE ENCOUNTER
Patient left message Murali Keys told her after scope yesterday to continue protonix 40 mg BID   Needs med to [facility]

## 2020-08-14 ENCOUNTER — OFFICE VISIT (OUTPATIENT)
Dept: GASTROENTEROLOGY | Facility: CLINIC | Age: 65
End: 2020-08-14
Payer: COMMERCIAL

## 2020-08-14 VITALS
DIASTOLIC BLOOD PRESSURE: 78 MMHG | WEIGHT: 205 LBS | HEIGHT: 65 IN | BODY MASS INDEX: 34.16 KG/M2 | TEMPERATURE: 97.8 F | SYSTOLIC BLOOD PRESSURE: 150 MMHG

## 2020-08-14 DIAGNOSIS — K21.00 GASTROESOPHAGEAL REFLUX DISEASE WITH ESOPHAGITIS: ICD-10-CM

## 2020-08-14 DIAGNOSIS — Z12.11 SCREENING FOR COLON CANCER: ICD-10-CM

## 2020-08-14 DIAGNOSIS — R07.89 CHEST PAIN, ATYPICAL: Primary | ICD-10-CM

## 2020-08-14 PROCEDURE — 99213 OFFICE O/P EST LOW 20 MIN: CPT | Performed by: INTERNAL MEDICINE

## 2020-08-14 NOTE — PROGRESS NOTES
[facility] - Outpatient Follow-up Note  Lissette Xiao 71 y o  female MRN: [id number]  Encounter: [id number]    ASSESSMENT AND PLAN:      1  Chest pain, atypical  Improved on twice a day Protonix  This might have been due to acid reflux it is also possible this might be musculoskeletal   I have asked her to continue her twice a day Protonix through the end of September and then decrease it to once a day  Asked her to bring any worsening or new symptoms to our attention    2  Screening for colon cancer  Colonoscopy Done elsewhere about 3 years ago  3  Gastroesophageal reflux disease with esophagitis  As above continue Protonix twice a day through the end of September them once daily      Followup Appointment:  1 year  ______________________________________________________________________    Chief Complaint   Patient presents with    Follow-up     Post EGD     HPI:  Chest pain is better but not completely gone  She thinks the Protonix twice a day is helping it  No dysphagia  There is no exertional component to the pain  No relation to eating    It is primarily left-sided chest pain    Historical Information   Past Medical History:   Diagnosis Date    Arthritis     [facility]     Diabetes mellitus [facility] )     GERD (gastroesophageal reflux disease)     Renal disorder     Sleep apnea      Past Surgical History:   Procedure Laterality Date     SECTION      COLONOSCOPY      RENAL CYST EXCISION      TONSILLECTOMY       Social History     Substance and Sexual Activity   Alcohol Use No     Social History     Substance and Sexual Activity   Drug Use No     Social History     Tobacco Use   Smoking Status Former Smoker    Packs/day: 0 50    Types: Cigarettes    Last attempt to quit: 10/29/1981    Years since quittin 8   Smokeless Tobacco Never Used     Family History   Problem Relation Age of Onset    COPD Mother    Fox Pert Lung cancer Mother     Lung cancer Father     Hypertension Father     No Known Problems Sister     Hypertension Brother     Colon polyps Brother     Kidney disease Brother     Colon polyps Brother     Stroke Brother     Kidney disease Brother     Hypertension Brother     Hyperlipidemia Brother     Gestational diabetes Sister     Colon cancer Maternal Aunt          Current Outpatient Medications:     ascorbic acid (VITAMIN C) 250 mg tablet    aspirin 81 mg chewable tablet    cholecalciferol (VITAMIN D3) 1,000 units tablet    escitalopram (LEXAPRO) 10 mg tablet    halobetasol (ULTRAVATE) 0 05 % ointment    losartan (COZAAR) 50 mg tablet    metFORMIN (GLUCOPHAGE) 1000 MG tablet    Multiple Vitamins-Minerals (AIRBORNE GUMMIES PO)    pantoprazole (PROTONIX) 40 mg tablet    Polyethyl Glycol-Propyl Glycol (Systane) 0 4-0 3 % GEL    simvastatin (ZOCOR) 40 mg tablet  Allergies   Allergen Reactions    Demerol [Meperidine] GI Intolerance     Other reaction(s): N/V    Morphine GI Intolerance     Other reaction(s): N/V     Reviewed medications and allergies and updated as indicated    PHYSICAL EXAM:    Blood pressure 150/78, temperature 97 8 °F (36 6 °C), height 5' 5" (1 651 m), weight 93 kg (205 lb)  Body mass index is 34 11 kg/m²  General Appearance: NAD, cooperative, alert  Eyes: Anicteric, PERRLA, EOMI  ENT:  Normocephalic, atraumatic, normal mucosa  Neck:  Supple, symmetrical, trachea midline  Resp:  Clear to auscultation bilaterally; no rales, rhonchi or wheezing; respirations unlabored   CV:  S1 S2, Regular rate and rhythm; no murmur, rub, or gallop  GI:  Soft, non-tender, non-distended; normal bowel sounds; no masses, no organomegaly   Rectal: Deferred  Musculoskeletal: No cyanosis, clubbing or edema  Normal ROM    Skin:  No jaundice, rashes, or lesions   Heme/Lymph: No palpable cervical lymphadenopathy  Psych: Normal affect, good eye contact  Neuro: No gross deficits, AAOx3    Lab Results: Lab Results   Component Value Date    WBC 6 69 06/15/2020    HGB 12 4 06/15/2020    HCT 38 8 06/15/2020    MCV 91 06/15/2020     06/15/2020     Lab Results   Component Value Date    K 3 8 06/15/2020     06/15/2020    CO2 30 06/15/2020    BUN 12 06/15/2020    CREATININE 0 76 06/15/2020    CALCIUM 10 2 (H) 06/15/2020    AST 15 06/15/2020    ALT 28 06/15/2020    ALKPHOS 104 06/15/2020    EGFR 83 06/15/2020     No results found for: IRON, TIBC, FERRITIN  No results found for: LIPASE    Radiology Results:   No results found

## 2020-09-26 DIAGNOSIS — K20.90 ESOPHAGITIS: ICD-10-CM

## 2020-10-02 RX ORDER — PANTOPRAZOLE SODIUM 40 MG/1
TABLET, DELAYED RELEASE ORAL
Qty: 180 TABLET | Refills: 0 | Status: SHIPPED | OUTPATIENT
Start: 2020-10-02 | End: 2020-11-05 | Stop reason: SDUPTHER

## 2020-11-05 DIAGNOSIS — K20.90 ESOPHAGITIS: ICD-10-CM

## 2020-11-05 RX ORDER — PANTOPRAZOLE SODIUM 40 MG/1
TABLET, DELAYED RELEASE ORAL
Qty: 90 TABLET | Refills: 0 | Status: SHIPPED | OUTPATIENT
Start: 2020-11-05 | End: 2021-03-01

## 2020-11-14 LAB
25(OH)D3+25(OH)D2 SERPL-MCNC: 48.2 NG/ML (ref 30–100)
ALBUMIN SERPL-MCNC: 4.2 G/DL (ref 3.8–4.8)
ALBUMIN/CREAT UR: 19 MG/G CREAT (ref 0–29)
ALBUMIN/GLOB SERPL: 1.7 {RATIO} (ref 1.2–2.2)
ALP SERPL-CCNC: 84 IU/L (ref 39–117)
ALT SERPL-CCNC: 16 IU/L (ref 0–32)
AST SERPL-CCNC: 13 IU/L (ref 0–40)
BASOPHILS # BLD AUTO: 0.1 X10E3/UL (ref 0–0.2)
BASOPHILS NFR BLD AUTO: 1 %
BILIRUB SERPL-MCNC: 0.6 MG/DL (ref 0–1.2)
BUN SERPL-MCNC: 13 MG/DL (ref 8–27)
BUN/CREAT SERPL: 18 (ref 12–28)
CALCIUM SERPL-MCNC: 10.1 MG/DL (ref 8.7–10.3)
CHLORIDE SERPL-SCNC: 102 MMOL/L (ref 96–106)
CHOLEST SERPL-MCNC: 111 MG/DL (ref 100–199)
CO2 SERPL-SCNC: 26 MMOL/L (ref 20–29)
CREAT SERPL-MCNC: 0.71 MG/DL (ref 0.57–1)
CREAT UR-MCNC: 56.5 MG/DL
EOSINOPHIL # BLD AUTO: 0.2 X10E3/UL (ref 0–0.4)
EOSINOPHIL NFR BLD AUTO: 3 %
ERYTHROCYTE [DISTWIDTH] IN BLOOD BY AUTOMATED COUNT: 12.8 % (ref 11.7–15.4)
GLOBULIN SER-MCNC: 2.5 G/DL (ref 1.5–4.5)
GLUCOSE SERPL-MCNC: 136 MG/DL (ref 65–99)
HBA1C MFR BLD: 6.4 % (ref 4.8–5.6)
HCT VFR BLD AUTO: 35.8 % (ref 34–46.6)
HDLC SERPL-MCNC: 38 MG/DL
HGB BLD-MCNC: 11.6 G/DL (ref 11.1–15.9)
IMM GRANULOCYTES # BLD: 0 X10E3/UL (ref 0–0.1)
IMM GRANULOCYTES NFR BLD: 0 %
LDLC SERPL CALC-MCNC: 52 MG/DL (ref 0–99)
LYMPHOCYTES # BLD AUTO: 1.9 X10E3/UL (ref 0.7–3.1)
LYMPHOCYTES NFR BLD AUTO: 28 %
MCH RBC QN AUTO: 28.7 PG (ref 26.6–33)
MCHC RBC AUTO-ENTMCNC: 32.4 G/DL (ref 31.5–35.7)
MCV RBC AUTO: 89 FL (ref 79–97)
MICROALBUMIN UR-MCNC: 10.7 UG/ML
MONOCYTES # BLD AUTO: 0.4 X10E3/UL (ref 0.1–0.9)
MONOCYTES NFR BLD AUTO: 6 %
NEUTROPHILS # BLD AUTO: 4.2 X10E3/UL (ref 1.4–7)
NEUTROPHILS NFR BLD AUTO: 62 %
PLATELET # BLD AUTO: 203 X10E3/UL (ref 150–450)
POTASSIUM SERPL-SCNC: 4.1 MMOL/L (ref 3.5–5.2)
PROT SERPL-MCNC: 6.7 G/DL (ref 6–8.5)
PTH-INTACT SERPL-MCNC: 27 PG/ML (ref 15–65)
RBC # BLD AUTO: 4.04 X10E6/UL (ref 3.77–5.28)
SL AMB EGFR AFRICAN AMERICAN: 103 ML/MIN/1.73
SL AMB EGFR NON AFRICAN AMERICAN: 89 ML/MIN/1.73
SODIUM SERPL-SCNC: 140 MMOL/L (ref 134–144)
TRIGL SERPL-MCNC: 116 MG/DL (ref 0–149)
TSH SERPL DL<=0.005 MIU/L-ACNC: 2.53 UIU/ML (ref 0.45–4.5)
WBC # BLD AUTO: 6.8 X10E3/UL (ref 3.4–10.8)

## 2020-12-03 ENCOUNTER — TELEMEDICINE (OUTPATIENT)
Dept: ENDOCRINOLOGY | Facility: HOSPITAL | Age: 65
End: 2020-12-03
Payer: COMMERCIAL

## 2020-12-03 ENCOUNTER — TELEPHONE (OUTPATIENT)
Dept: ENDOCRINOLOGY | Facility: HOSPITAL | Age: 65
End: 2020-12-03

## 2020-12-03 DIAGNOSIS — E78.5 HYPERLIPIDEMIA, UNSPECIFIED HYPERLIPIDEMIA TYPE: ICD-10-CM

## 2020-12-03 DIAGNOSIS — E11.9 TYPE 2 DIABETES MELLITUS WITHOUT COMPLICATION, WITHOUT LONG-TERM CURRENT USE OF INSULIN (HCC): Primary | ICD-10-CM

## 2020-12-03 DIAGNOSIS — I10 ESSENTIAL HYPERTENSION: ICD-10-CM

## 2020-12-03 DIAGNOSIS — E83.52 HYPERCALCEMIA: ICD-10-CM

## 2020-12-03 PROCEDURE — 99214 OFFICE O/P EST MOD 30 MIN: CPT | Performed by: INTERNAL MEDICINE

## 2020-12-15 DIAGNOSIS — I10 ESSENTIAL HYPERTENSION: ICD-10-CM

## 2020-12-15 RX ORDER — LOSARTAN POTASSIUM 50 MG/1
TABLET ORAL
Qty: 90 TABLET | Refills: 1 | Status: SHIPPED | OUTPATIENT
Start: 2020-12-15 | End: 2021-05-24

## 2021-01-29 NOTE — PRE-PROCEDURE INSTRUCTIONS
Pre-Surgery Instructions:   Medication Instructions    ascorbic acid (VITAMIN C) 250 mg tablet Instructed patient per Anesthesia Guidelines   aspirin 81 mg chewable tablet Patient was instructed by Physician and understands   cholecalciferol (VITAMIN D3) 1,000 units tablet Instructed patient per Anesthesia Guidelines   escitalopram (LEXAPRO) 10 mg tablet Instructed patient per Anesthesia Guidelines   halobetasol (ULTRAVATE) 0 05 % ointment Instructed patient per Anesthesia Guidelines   losartan (COZAAR) 50 mg tablet Instructed patient per Anesthesia Guidelines   metFORMIN (GLUCOPHAGE) 1000 MG tablet Instructed patient per Anesthesia Guidelines   Multiple Vitamins-Minerals (AIRBORNE GUMMIES PO) Instructed patient per Anesthesia Guidelines   pantoprazole (PROTONIX) 40 mg tablet Instructed patient per Anesthesia Guidelines   Polyethyl Glycol-Propyl Glycol (Systane) 0 4-0 3 % GEL Instructed patient per Anesthesia Guidelines   simvastatin (ZOCOR) 40 mg tablet Instructed patient per Anesthesia Guidelines  Follow prep as per physician  You will receive a call with your time to arrive at the hospital   Secure transportation, you will be having anesthesia 
Patient

## 2021-02-26 LAB
1,25(OH)2D3 SERPL-MCNC: 42.4 PG/ML (ref 19.9–79.3)
25(OH)D3+25(OH)D2 SERPL-MCNC: 43.9 NG/ML (ref 30–100)
ALBUMIN SERPL-MCNC: 4.2 G/DL (ref 3.8–4.8)
ALBUMIN/CREAT UR: 16 MG/G CREAT (ref 0–29)
ALBUMIN/GLOB SERPL: 1.7 {RATIO} (ref 1.2–2.2)
ALP SERPL-CCNC: 95 IU/L (ref 39–117)
ALT SERPL-CCNC: 20 IU/L (ref 0–32)
AST SERPL-CCNC: 13 IU/L (ref 0–40)
BASOPHILS # BLD AUTO: 0 X10E3/UL (ref 0–0.2)
BASOPHILS NFR BLD AUTO: 1 %
BILIRUB SERPL-MCNC: 0.6 MG/DL (ref 0–1.2)
BUN SERPL-MCNC: 13 MG/DL (ref 8–27)
BUN/CREAT SERPL: 19 (ref 12–28)
CALCIUM SERPL-MCNC: 10.4 MG/DL (ref 8.7–10.3)
CHLORIDE SERPL-SCNC: 103 MMOL/L (ref 96–106)
CHOLEST SERPL-MCNC: 123 MG/DL (ref 100–199)
CO2 SERPL-SCNC: 25 MMOL/L (ref 20–29)
CREAT SERPL-MCNC: 0.69 MG/DL (ref 0.57–1)
CREAT UR-MCNC: 39.1 MG/DL
EOSINOPHIL # BLD AUTO: 0.2 X10E3/UL (ref 0–0.4)
EOSINOPHIL NFR BLD AUTO: 2 %
ERYTHROCYTE [DISTWIDTH] IN BLOOD BY AUTOMATED COUNT: 12.6 % (ref 11.7–15.4)
EST. AVERAGE GLUCOSE BLD GHB EST-MCNC: 134 MG/DL
GLOBULIN SER-MCNC: 2.5 G/DL (ref 1.5–4.5)
GLUCOSE SERPL-MCNC: 131 MG/DL (ref 65–99)
HBA1C MFR BLD: 6.3 % (ref 4.8–5.6)
HCT VFR BLD AUTO: 37.9 % (ref 34–46.6)
HDLC SERPL-MCNC: 41 MG/DL
HGB BLD-MCNC: 11.9 G/DL (ref 11.1–15.9)
IMM GRANULOCYTES # BLD: 0 X10E3/UL (ref 0–0.1)
IMM GRANULOCYTES NFR BLD: 0 %
LDLC SERPL CALC-MCNC: 60 MG/DL (ref 0–99)
LDLC/HDLC SERPL: 1.5 RATIO (ref 0–3.2)
LYMPHOCYTES # BLD AUTO: 2.1 X10E3/UL (ref 0.7–3.1)
LYMPHOCYTES NFR BLD AUTO: 33 %
MCH RBC QN AUTO: 27.5 PG (ref 26.6–33)
MCHC RBC AUTO-ENTMCNC: 31.4 G/DL (ref 31.5–35.7)
MCV RBC AUTO: 88 FL (ref 79–97)
MICROALBUMIN UR-MCNC: 6.2 UG/ML
MONOCYTES # BLD AUTO: 0.4 X10E3/UL (ref 0.1–0.9)
MONOCYTES NFR BLD AUTO: 7 %
NEUTROPHILS # BLD AUTO: 3.7 X10E3/UL (ref 1.4–7)
NEUTROPHILS NFR BLD AUTO: 57 %
PHOSPHATE SERPL-MCNC: 3 MG/DL (ref 3–4.3)
PLATELET # BLD AUTO: 220 X10E3/UL (ref 150–450)
POTASSIUM SERPL-SCNC: 4.6 MMOL/L (ref 3.5–5.2)
PROT SERPL-MCNC: 6.7 G/DL (ref 6–8.5)
PTH-INTACT SERPL-MCNC: 41 PG/ML (ref 15–65)
RBC # BLD AUTO: 4.32 X10E6/UL (ref 3.77–5.28)
SL AMB EGFR AFRICAN AMERICAN: 105 ML/MIN/1.73
SL AMB EGFR NON AFRICAN AMERICAN: 91 ML/MIN/1.73
SL AMB VLDL CHOLESTEROL CALC: 22 MG/DL (ref 5–40)
SODIUM SERPL-SCNC: 141 MMOL/L (ref 134–144)
TRIGL SERPL-MCNC: 120 MG/DL (ref 0–149)
TSH SERPL DL<=0.005 MIU/L-ACNC: 2.32 UIU/ML (ref 0.45–4.5)
WBC # BLD AUTO: 6.4 X10E3/UL (ref 3.4–10.8)

## 2021-02-27 DIAGNOSIS — K20.90 ESOPHAGITIS: ICD-10-CM

## 2021-03-01 DIAGNOSIS — E83.52 HYPERCALCEMIA: ICD-10-CM

## 2021-03-01 DIAGNOSIS — E11.9 TYPE 2 DIABETES MELLITUS WITHOUT COMPLICATION, WITHOUT LONG-TERM CURRENT USE OF INSULIN (HCC): Primary | ICD-10-CM

## 2021-03-01 RX ORDER — PANTOPRAZOLE SODIUM 40 MG/1
TABLET, DELAYED RELEASE ORAL
Qty: 90 TABLET | Refills: 0 | Status: SHIPPED | OUTPATIENT
Start: 2021-03-01 | End: 2021-05-24

## 2021-03-04 DIAGNOSIS — Z23 ENCOUNTER FOR IMMUNIZATION: ICD-10-CM

## 2021-03-24 ENCOUNTER — TRANSCRIBE ORDERS (OUTPATIENT)
Dept: ADMINISTRATIVE | Facility: HOSPITAL | Age: 66
End: 2021-03-24

## 2021-03-24 ENCOUNTER — APPOINTMENT (OUTPATIENT)
Dept: RADIOLOGY | Facility: CLINIC | Age: 66
End: 2021-03-24
Payer: COMMERCIAL

## 2021-03-24 DIAGNOSIS — M79.672 LEFT FOOT PAIN: ICD-10-CM

## 2021-03-24 DIAGNOSIS — M79.671 RIGHT FOOT PAIN: ICD-10-CM

## 2021-03-24 DIAGNOSIS — M79.671 RIGHT FOOT PAIN: Primary | ICD-10-CM

## 2021-03-24 PROCEDURE — 73630 X-RAY EXAM OF FOOT: CPT

## 2021-05-04 VITALS — WEIGHT: 204 LBS | HEIGHT: 65 IN | BODY MASS INDEX: 33.99 KG/M2

## 2021-05-04 NOTE — TELEPHONE ENCOUNTER
Why does your doctor want you to have this procedure? Hx polyps; Fhx polyps    Do you have kidney disease? Yes, polycystic kidney disease    If yes, are you on dialysis : No    Have you had diverticulitis within the past 2 months? no    Are you diabetic? Yes  If yes, insulin dependent: NIDDM  If yes, provide diabetic instructions sheet     Do take iron supplements?  no  If yes, instruct patient to hold iron supplement for 7 days prior    Are you on a blood thinner? no   Was the blood thinner sheet complete and faxed to cardiologist no  Plavix (clopidogrel), Coumadin (warfarin), Lovenox (enoxaparin), Xarelto (rivaroxaban), Pradaxa(dabigatran), Eliquis(apixaban) Savaysa/Lixiana (edoxapan)    Do you have an automatic implantable cardiac defibrillator (AICD)/pacemaker (GVH)? No  Was AICD/pacemaker sheet completed and faxed to cardiologist? no    Are you on home oxygen? no  If yes, continuous or nocturnal:     Have you been treated for MRSA, VRE or any communicable diseases? no    Heart attack, stroke, or stent within 3 months? no  Schedule at Hospital if within 3-6 months   Use nitroglycerin for chest pain in the last 6 months? no    History of organ  transplant?  no   If yes, notify Endo      History of neck/throat/tongue surgery or cancer? Yes, tonsillectomy   IF yes, notify Endo      Any problems with anesthesia in the past? Yes, difficult intubation; narrow airway per pt and OV note 2020; must be done at hospitals     Was stool C diff ordered?  no Stool specimen needs to be completed prior to procedure    Do have any facial or body piercings?no     Do you have a latex allergy? no     Do have an allergy to metals? (Bravo study only) no     If pediatric patient, was consent faxed to pediatrician no     Patient rights reviewed yes     Colon phone prep completed; miralax instructions reviewed and emailed to pt

## 2021-05-06 ENCOUNTER — EVALUATION (OUTPATIENT)
Dept: PHYSICAL THERAPY | Facility: CLINIC | Age: 66
End: 2021-05-06
Payer: COMMERCIAL

## 2021-05-06 DIAGNOSIS — M72.2 PLANTAR FASCIITIS OF LEFT FOOT: Primary | ICD-10-CM

## 2021-05-06 PROCEDURE — 97161 PT EVAL LOW COMPLEX 20 MIN: CPT | Performed by: PHYSICAL THERAPIST

## 2021-05-06 PROCEDURE — 97110 THERAPEUTIC EXERCISES: CPT | Performed by: PHYSICAL THERAPIST

## 2021-05-06 NOTE — PROGRESS NOTES
PT Evaluation     Today's date: 2021  Patient name: Nnamdi Santiago  : 89  MRN: [id number]  Referring provider: Anuja Russell DPM  Dx:   Encounter Diagnosis     ICD-10-CM    1  Plantar fasciitis of left foot  M72 2                 Pt never returned for follow up visits d/c to [facility] at this time  Assessment  Assessment details: Nnamdi Santiago is a 76 y o  female who presents with pain, decreased strength, decreased ROM, decreased joint mobility, ambulatory dysfunction and postural  dysfunction  Due to these impairments, patient has difficulty performing a/iadls, recreational activities, work-related activities and engaging in social activities  Patient's clinical presentation is consistent with their referring diagnosis of Plantar fasciitis of left foot  Patient has been educated in home exercise program and plan of care  Patient would benefit from skilled physical therapy services to address their aforementioned functional limitations and progress towards prior level of function and independence with home exercise program    Impairments: abnormal gait, abnormal muscle firing, abnormal or restricted ROM, activity intolerance, impaired balance, impaired physical strength, lacks appropriate home exercise program, pain with function and weight-bearing intolerance  Understanding of Dx/Px/POC: good   Prognosis: good    Goals  Impairment Goals 3 weeks  - Decrease pain to <2/10  - Improve AROM equal to contralateral side  - Increase strength equal to contralateral side    Functional Goals in 6 weeks  - Patient will be independent with comprehensive [facility]  - Ambulation is improved to prior level of function  - Stair climbing is improved to prior level of function  - Squatting is improved to prior level of function  - pt will be able to return to walking program with out increased heel pain          Plan  Patient would benefit from: skilled PT  Planned modality interventions: cryotherapy, electrical stimulation/Russian stimulation and thermotherapy: hydrocollator packs  Planned therapy interventions: joint mobilization, manual therapy, patient education, postural training, activity modification, abdominal trunk stabilization, body mechanics training, flexibility, functional ROM exercises, graded exercise, home exercise program, neuromuscular re-education, strengthening, stretching, therapeutic activities, therapeutic exercise, motor coordination training, muscle pump exercises, gait training, balance/weight bearing training, ADL training and breathing training  Frequency: 2x week  Duration in weeks: 8  Plan of Care beginning date: 2021  Plan of Care expiration date: 2021  Treatment plan discussed with: patient        Subjective Evaluation    History of Present Illness  Mechanism of injury: Pt notes a few month hx of left heel pain  She notes that it has improved slightly with the stretches and the cortisone shot from the [facility]  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, 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  Pt notes that she was walking daily even initially through the pain  The pain got to intense to cont with walking program  Pt does note that she was 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  Pt notes that going up stairs is very painful  Also notes that pain starts intially in am, gets better through the day, and then worse in the evening  Quality of life: good    Pain  Current pain ratin  At best pain ratin  At worst pain ratin  Location: left heel  Quality: dull ache, sharp and discomfort    Patient Goals  Patient goals for therapy: decreased pain, increased motion, independence with ADLs/IADLs, increased strength and return to sport/leisure activities          Objective     Static Posture     Ankle/Foot   Ankle/Foot (Left): Calcaneovarus  Observations   Left Ankle/Foot   Positive for edema       Palpation   Left   Tenderness of the lateral gastrocnemius and medial gastrocnemius  Tenderness   Left Ankle/Foot   Tenderness in the Achilles insertion and medial calcaneus  Neurological Testing     Sensation     Ankle/Foot   Left Ankle/Foot   Intact: light touch    Right Ankle/Foot   Intact: light touch     Active Range of Motion   Left Ankle/Foot   Normal active range of motion    Right Ankle/Foot   Normal active range of motion    Joint Play   Left Ankle/Foot  Hypomobile in the distal tibiofibular joint, talocrural joint and subtalar joint  Strength/Myotome Testing     Left Ankle/Foot   Dorsiflexion: 4  Plantar flexion: 4  Inversion: 4-  Eversion: 4-    Right Ankle/Foot   Dorsiflexion: 4+  Plantar flexion: 4+  Inversion: 4+  Eversion: 4+    Ambulation     Observational Gait   Gait: antalgic   Decreased walking speed and stride length     Left foot contact pattern: foot flat  Right foot contact pattern: heel to toe             Precautions: none      Manuals             ktape I strip plantar fascia DB            Ktape compression I strip fat pad DB                                      Neuro Re-Ed             SLS nv            Balance brds nv                                                                             Ther Ex             bike nv            Ankle 4 way  Green x10 ea            Long sitting PF towel stretch 20''x5            HR/TR nv                                                                Ther Activity                                       Gait Training                                       Modalities

## 2021-05-12 ENCOUNTER — APPOINTMENT (OUTPATIENT)
Dept: PHYSICAL THERAPY | Facility: CLINIC | Age: 66
End: 2021-05-12
Payer: COMMERCIAL

## 2021-05-14 ENCOUNTER — APPOINTMENT (OUTPATIENT)
Dept: PHYSICAL THERAPY | Facility: CLINIC | Age: 66
End: 2021-05-14
Payer: COMMERCIAL

## 2021-05-15 LAB
25(OH)D3+25(OH)D2 SERPL-MCNC: 45.9 NG/ML (ref 30–100)
ALBUMIN SERPL-MCNC: 4.4 G/DL (ref 3.8–4.8)
ALBUMIN/GLOB SERPL: 1.8 {RATIO} (ref 1.2–2.2)
ALP SERPL-CCNC: 85 IU/L (ref 39–117)
ALT SERPL-CCNC: 19 IU/L (ref 0–32)
AST SERPL-CCNC: 14 IU/L (ref 0–40)
BILIRUB SERPL-MCNC: 0.5 MG/DL (ref 0–1.2)
BUN SERPL-MCNC: 12 MG/DL (ref 8–27)
BUN/CREAT SERPL: 18 (ref 12–28)
CALCIUM SERPL-MCNC: 10.4 MG/DL (ref 8.7–10.3)
CHLORIDE SERPL-SCNC: 104 MMOL/L (ref 96–106)
CHOLEST SERPL-MCNC: 126 MG/DL (ref 100–199)
CO2 SERPL-SCNC: 27 MMOL/L (ref 20–29)
CREAT SERPL-MCNC: 0.67 MG/DL (ref 0.57–1)
EST. AVERAGE GLUCOSE BLD GHB EST-MCNC: 137 MG/DL
GLOBULIN SER-MCNC: 2.4 G/DL (ref 1.5–4.5)
GLUCOSE SERPL-MCNC: 134 MG/DL (ref 65–99)
HBA1C MFR BLD: 6.4 % (ref 4.8–5.6)
HDLC SERPL-MCNC: 37 MG/DL
LDLC SERPL CALC-MCNC: 60 MG/DL (ref 0–99)
LDLC/HDLC SERPL: 1.6 RATIO (ref 0–3.2)
POTASSIUM SERPL-SCNC: 4.3 MMOL/L (ref 3.5–5.2)
PROT SERPL-MCNC: 6.8 G/DL (ref 6–8.5)
PTH-INTACT SERPL-MCNC: 46 PG/ML (ref 15–65)
SL AMB EGFR AFRICAN AMERICAN: 106 ML/MIN/1.73
SL AMB EGFR NON AFRICAN AMERICAN: 92 ML/MIN/1.73
SL AMB VLDL CHOLESTEROL CALC: 29 MG/DL (ref 5–40)
SODIUM SERPL-SCNC: 142 MMOL/L (ref 134–144)
TRIGL SERPL-MCNC: 175 MG/DL (ref 0–149)
TSH SERPL DL<=0.005 MIU/L-ACNC: 1.18 UIU/ML (ref 0.45–4.5)

## 2021-05-24 DIAGNOSIS — K20.90 ESOPHAGITIS: ICD-10-CM

## 2021-05-24 DIAGNOSIS — E11.9 TYPE 2 DIABETES MELLITUS WITHOUT COMPLICATION, WITHOUT LONG-TERM CURRENT USE OF INSULIN (HCC): ICD-10-CM

## 2021-05-24 DIAGNOSIS — I10 ESSENTIAL HYPERTENSION: ICD-10-CM

## 2021-05-24 RX ORDER — LOSARTAN POTASSIUM 50 MG/1
TABLET ORAL
Qty: 90 TABLET | Refills: 1 | Status: SHIPPED | OUTPATIENT
Start: 2021-05-24

## 2021-05-24 RX ORDER — PANTOPRAZOLE SODIUM 40 MG/1
TABLET, DELAYED RELEASE ORAL
Qty: 90 TABLET | Refills: 5 | Status: SHIPPED | OUTPATIENT
Start: 2021-05-24

## 2021-05-26 ENCOUNTER — OFFICE VISIT (OUTPATIENT)
Dept: GASTROENTEROLOGY | Facility: CLINIC | Age: 66
End: 2021-05-26
Payer: COMMERCIAL

## 2021-05-26 VITALS
HEIGHT: 65 IN | HEART RATE: 80 BPM | DIASTOLIC BLOOD PRESSURE: 70 MMHG | SYSTOLIC BLOOD PRESSURE: 126 MMHG | WEIGHT: 207.4 LBS | BODY MASS INDEX: 34.55 KG/M2

## 2021-05-26 DIAGNOSIS — K64.2 PROLAPSED INTERNAL HEMORRHOIDS, GRADE 3: Primary | ICD-10-CM

## 2021-05-26 PROCEDURE — 46221 LIGATION OF HEMORRHOID(S): CPT | Performed by: INTERNAL MEDICINE

## 2021-05-26 NOTE — PATIENT INSTRUCTIONS
[id number] - Hemorrhoid Banding Roland Gil 76 y o  female MRN: [id number]  Encounter: [id number]    ASSESSMENT AND PLAN:    The right posterior hemorrhoid area was banded today  The patient was instructed to avoid constipation and straining, and educated in appropriate fiber intake  HPI: Roland Gil is a 76y o  year old female who presents with Anal discomfort due to hemorrhoids  Previous treatments include:  No previous treatment  Bands were previously placed:  No previous bands placed   Current Fiber intake:  High-fiber diet  Complications of prior therapy include:  none    The patient provided consent for banding  and was placed in the left lateral position  Rectal exam showed  Prolapsing hemorrhoids and anal tag  Mostly in the left lateral and right posterior position  The [place] ligator was advanced and a band was applied without difficulty   Repeat rectal exam confirmed appropriate placement  The patient was discharged home after observation in the waiting area  The exam was chaperoned by Jason Dus Band Ligation   WHAT YOU NEED TO KNOW:   Rubber band ligation (RBL) is an outpatient procedure to remove internal hemorrhoids  DISCHARGE INSTRUCTIONS:   Call 911 for any of the following:   · You suddenly feel lightheaded and short of breath  Seek care immediately if:   · You bleed from your anus, and you cannot get it to stop  · You have severe pain in your stomach or anus  · You cannot urinate, or you urinate very little  Contact your healthcare provider if:   · You have nausea or are vomiting  · You have a fever  · You have pain or trouble urinating or having a bowel movement  · You have questions or concerns about your condition or care  Medicines: You may need any of the following:  · NSAIDs , such as ibuprofen, help decrease swelling, pain, and fever  This medicine is available with or without a doctor's order  NSAIDs can cause stomach bleeding or kidney problems in certain people  If you take blood thinner medicine, always ask your healthcare provider if NSAIDs are safe for you  Always read the medicine label and follow directions  · Fiber supplements  1 to 2 times a day may help you have regular, soft bowel movements  You do not need a doctor's order for fiber supplements  They may be taken as a powder, pill, or chewable tablet  · Take your medicine as directed  Contact your healthcare provider if you think your medicine is not helping or if you have side effects  Tell him or her if you are allergic to any medicine  Keep a list of the medicines, vitamins, and herbs you take  Include the amounts, and when and why you take them  Bring the list or the pill bottles to follow-up visits  Carry your medicine list with you in case of an emergency  Warm sitz bath:  Fill a bathtub with 4 to 6 inches of warm water  You may also use a sitz bath pan that fits over a toilet  Sit in the sitz bath for 15 minutes  Do this 2 to 3 times a day, or as directed  The warm water can help decrease pain and swelling  Bowel movements:  Try to have a bowel movement within 48 hours  You may have a small amount of bleeding  Do not strain when you have a bowel movement  The following will help you have soft and regular bowel movements:  · Eat a variety of high fiber foods  High-fiber foods include fruits, vegetables, whole grains, and bran  · Drink liquids as directed  You may need to drink more liquids than usual  Water is the best liquid to drink  Ask how much liquid to drink each day and which liquids are best for you  · Exercise regularly  Ask about the best exercise plan for you  Return to work: You can usually return to work right away  Follow up with your healthcare provider as directed:  Write down your questions so you remember to ask them during your visits     © Copyright [employer] 2020 Information is for End User's use only and may not be sold, redistributed or otherwise used for commercial purposes  All illustrations and images included in CareNotes® are the copyrighted property of A D A M , Inc  or Sherrie Marr  The above information is an  only  It is not intended as medical advice for individual conditions or treatments  Talk to your doctor, nurse or pharmacist before following any medical regimen to see if it is safe and effective for you

## 2021-05-26 NOTE — PROGRESS NOTES
[facility] - Hemorrhoid Banding Waleska Degroot 76 y o  female MRN: [id number]  Encounter: [id number]    ASSESSMENT AND PLAN:    The right posterior hemorrhoid area was banded today  The patient was instructed to avoid constipation and straining, and educated in appropriate fiber intake  HPI: Waleska Degroot is a 76y o  year old female who presents with Anal discomfort due to hemorrhoids  Previous treatments include:  No previous treatment  Bands were previously placed:  No previous bands placed   Current Fiber intake:  High-fiber diet  Complications of prior therapy include:  none    The patient provided consent for banding  and was placed in the left lateral position  Rectal exam showed  Prolapsing hemorrhoids and anal tag    Mostly in the left lateral and right posterior position  The [place] ligator was advanced and a band was applied without difficulty   Repeat rectal exam confirmed appropriate placement  The patient was discharged home after observation in the waiting area  The exam was chaperoned by Leda Krueger

## 2021-06-29 ENCOUNTER — OFFICE VISIT (OUTPATIENT)
Dept: GASTROENTEROLOGY | Facility: CLINIC | Age: 66
End: 2021-06-29
Payer: COMMERCIAL

## 2021-06-29 VITALS
HEIGHT: 65 IN | DIASTOLIC BLOOD PRESSURE: 90 MMHG | WEIGHT: 207 LBS | SYSTOLIC BLOOD PRESSURE: 160 MMHG | BODY MASS INDEX: 34.49 KG/M2

## 2021-06-29 DIAGNOSIS — K64.2 PROLAPSED INTERNAL HEMORRHOIDS, GRADE 3: Primary | ICD-10-CM

## 2021-06-29 PROCEDURE — 46221 LIGATION OF HEMORRHOID(S): CPT | Performed by: INTERNAL MEDICINE

## 2021-06-29 NOTE — PROGRESS NOTES
[facility] - Hemorrhoid Banding Alissa Simons 76 y o  female MRN: [id number]  Encounter: [id number]    ASSESSMENT AND PLAN:    The right anterior hemorrhoid area was banded today  The patient was instructed to avoid constipation and straining, and educated in appropriate fiber intake  HPI: Alissa Simons is a 76y o  year old female who presents with a prolapse and discomfort due to hemorrhoids       Previous treatments include:  1 band  Bands were previously placed:  Right posterior   Current Fiber intake:  Good  Complications of prior therapy include:  None    The patient provided consent for banding  and was placed in the left lateral position  Rectal exam showed normal  The [place] ligator was advanced and a band was applied without difficulty   Repeat rectal exam confirmed appropriate placement  The patient was discharged home after observation in the waiting area  The exam was chaperoned by Markel Louis

## 2021-06-30 ENCOUNTER — OFFICE VISIT (OUTPATIENT)
Dept: PHYSICAL THERAPY | Facility: CLINIC | Age: 66
End: 2021-06-30
Payer: COMMERCIAL

## 2021-06-30 DIAGNOSIS — M72.2 PLANTAR FASCIITIS, LEFT: Primary | ICD-10-CM

## 2021-06-30 PROCEDURE — 97140 MANUAL THERAPY 1/> REGIONS: CPT | Performed by: PHYSICAL THERAPIST

## 2021-06-30 PROCEDURE — 97164 PT RE-EVAL EST PLAN CARE: CPT | Performed by: PHYSICAL THERAPIST

## 2021-06-30 NOTE — PROGRESS NOTES
PT Evaluation     Today's date: 2021  Patient name: Kecia Thompson  : [birth year]  MRN: [id number]  Referring provider: Efraín Lott DPM  Dx:   Encounter Diagnosis     ICD-10-CM    1  Plantar fasciitis, left  M72 2                 Pt never returned for follow up visits d/c to Hep at this time  Assessment  Assessment details: Kecia Thompson is a 76 y o  female who presents with pain, decreased strength, decreased ROM, decreased joint mobility, ambulatory dysfunction and postural  dysfunction  Due to these impairments, patient has difficulty performing a/iadls, recreational activities, work-related activities and engaging in social activities  Pt with improvement in symptoms today both with joint mobs, as well as taping for arch support  Patient's clinical presentation is consistent with their referring diagnosis of Plantar fasciitis of left foot  Patient has been educated in home exercise program and plan of care  Patient would benefit from skilled physical therapy services to address their aforementioned functional limitations and progress towards prior level of function and independence with home exercise program      Pt returns to PT after 2 month hiatus secondary to vacation  Pt will be with us for about 3 weeks before leaving on vacation again     Impairments: abnormal gait, abnormal muscle firing, abnormal or restricted ROM, activity intolerance, impaired balance, impaired physical strength, lacks appropriate home exercise program, pain with function and weight-bearing intolerance    Symptom irritability: moderateUnderstanding of Dx/Px/POC: good   Prognosis: good    Goals  Impairment Goals 3 weeks  - Decrease pain to <2/10  - Improve AROM equal to contralateral side  - Increase strength equal to contralateral side    Functional Goals in 6 weeks  - Patient will be independent with comprehensive HEP  - Ambulation is improved to prior level of function  - Stair climbing is improved to prior level of function  - Squatting is improved to prior level of function  - pt will be able to return to walking program with out increased heel pain  Plan  Patient would benefit from: skilled PT  Planned modality interventions: cryotherapy, electrical stimulation/Russian stimulation and thermotherapy: hydrocollator packs  Planned therapy interventions: joint mobilization, manual therapy, patient education, postural training, activity modification, abdominal trunk stabilization, body mechanics training, flexibility, functional ROM exercises, graded exercise, home exercise program, neuromuscular re-education, strengthening, stretching, therapeutic activities, therapeutic exercise, motor coordination training, muscle pump exercises, gait training, balance/weight bearing training, ADL training and breathing training  Frequency: 1x week  Duration in weeks: 8  Plan of Care beginning date: 2021  Plan of Care expiration date: 2021  Treatment plan discussed with: patient        Subjective Evaluation    History of Present Illness  Mechanism of injury: Pt notes a few month hx of left heel pain  She notes that it has improved slightly with the stretches and the cortisone shot from the [address]  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, 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  Pt notes that she was walking daily even initially through the pain  The pain got to intense to cont with walking program  Pt does note that she was 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  Pt notes that going up stairs is very painful  Also notes that pain starts intially in am, gets better through the day, and then worse in the evening  Pt returns to PT after 2 months away with vacation  Pt notes minimal change in symptoms with stretches initially given     Quality of life: good    Pain  Current pain ratin  At best pain ratin  At worst pain ratin  Location: left heel  Quality: dull ache, sharp and discomfort    Patient Goals  Patient goals for therapy: decreased pain, increased motion, independence with ADLs/IADLs, increased strength and return to sport/leisure activities          Objective     Static Posture     Ankle/Foot   Ankle/Foot (Left): Calcaneovarus  Comments  + hypomobility of the midfoot, and TC joint, that when mobilized improved ttp of the PF closest to the calcaneus  Taped today with Stirrup for improvement of fat pad positioning, as well as arch support with K tape    Observations   Left Ankle/Foot   Positive for edema  Palpation   Left   Tenderness of the lateral gastrocnemius and medial gastrocnemius  Tenderness   Left Ankle/Foot   Tenderness in the Achilles insertion and medial calcaneus  Neurological Testing     Sensation     Ankle/Foot   Left Ankle/Foot   Intact: light touch    Right Ankle/Foot   Intact: light touch     Active Range of Motion   Left Ankle/Foot   Normal active range of motion    Right Ankle/Foot   Normal active range of motion    Joint Play   Left Ankle/Foot  Hypomobile in the distal tibiofibular joint, talocrural joint and subtalar joint  Strength/Myotome Testing     Left Ankle/Foot   Dorsiflexion: 4  Plantar flexion: 4  Inversion: 4-  Eversion: 4-    Right Ankle/Foot   Dorsiflexion: 4+  Plantar flexion: 4+  Inversion: 4+  Eversion: 4+    Ambulation     Observational Gait   Gait: antalgic   Decreased walking speed and stride length     Left foot contact pattern: foot flat  Right foot contact pattern: heel to toe             Precautions: none      Manuals             ktape I strip plantar fascia DB            Ktape compression I strip fat pad DB                                      Neuro Re-Ed             SLS nv            Balance brds nv                                                                             Ther Ex             bike nv            Ankle 4 way  Green x10 ea            Long sitting PF towel stretch 20''x5            HR/TR nv Ther Activity                                       Gait Training                                       Modalities

## 2021-07-07 ENCOUNTER — OFFICE VISIT (OUTPATIENT)
Dept: PHYSICAL THERAPY | Facility: CLINIC | Age: 66
End: 2021-07-07
Payer: COMMERCIAL

## 2021-07-07 DIAGNOSIS — M72.2 PLANTAR FASCIITIS, LEFT: Primary | ICD-10-CM

## 2021-07-07 PROCEDURE — 97140 MANUAL THERAPY 1/> REGIONS: CPT

## 2021-07-07 PROCEDURE — 97110 THERAPEUTIC EXERCISES: CPT

## 2021-07-07 NOTE — PROGRESS NOTES
Daily Note     Today's date: 2021  Patient name: Luzmaria Cole  : 1955  MRN: [id number]  Referring provider: Supriya De Anda DPM  Dx:   Encounter Diagnosis     ICD-10-CM    1  Plantar fasciitis, left  M72 2                   Subjective: pt states that her pain feels the same, after last visit she had increased pain for a few days, states that she did not notice change with tape on  But that she has been compliant with HEP      Objective: See treatment diary below      Assessment: Tolerated treatment with no increased sxs ith STM changed taping slightly to fan for plantar fascia and correction strip to see if this changes sxs in foot    Patient did notice soreness with a/p on board but side no pain  Plan: Continue per plan of care        Precautions: none      Manuals             ktape I strip plantar fascia DB Split for PF-DL           Ktape compression I strip fat pad DB            Correction strip distal to heel  DL           stm PF  DL           Neuro Re-Ed             SLS nv            Balance brds nv x20 ea                                                                            Ther Ex             bike nv lv1 6'           Ankle 4 way  Green x10 ea            Long sitting PF towel stretch 20''x5            HR/TR nv                                                                Ther Activity                                       Gait Training                                       Modalities

## 2021-07-13 ENCOUNTER — OFFICE VISIT (OUTPATIENT)
Dept: PHYSICAL THERAPY | Facility: CLINIC | Age: 66
End: 2021-07-13
Payer: COMMERCIAL

## 2021-07-13 DIAGNOSIS — M72.2 PLANTAR FASCIITIS, LEFT: Primary | ICD-10-CM

## 2021-07-13 PROCEDURE — 97110 THERAPEUTIC EXERCISES: CPT | Performed by: PHYSICAL THERAPIST

## 2021-07-13 PROCEDURE — 97140 MANUAL THERAPY 1/> REGIONS: CPT | Performed by: PHYSICAL THERAPIST

## 2021-07-13 NOTE — PROGRESS NOTES
Daily Note     Today's date: 2021  Patient name: Ector Dorantes  :   MRN: [id number]  Referring provider: Ellen Hurtado DPM  Dx:   Encounter Diagnosis     ICD-10-CM    1  Plantar fasciitis, left  M72 2                   Subjective: pt notes that she has been having less pain when waking in the am  Also notes that overall pain levels are better  Objective: See treatment diary below      Assessment: Pt continues with improvement in pain levels, but still has tenderness through the left plantar fascia  Plan: Continue per plan of care        Precautions: none      Manuals            ktape I strip plantar fascia DB Split for PF-DL           Ktape compression I strip fat pad DB            Correction strip distal to heel  DL           Navicular, tc, 1st ray mobs   DB          stm PF  DL DB          Neuro Re-Ed             SLS nv            Balance brds nv x20 ea                                                                            Ther Ex             bike nv lv1 6' nv          Ankle 4 way  Green x10 ea            Long sitting PF towel stretch 20''x5  20''x5          HR/TR nv                                                                Ther Activity                                       Gait Training                                       Modalities

## 2021-07-14 ENCOUNTER — OFFICE VISIT (OUTPATIENT)
Dept: GASTROENTEROLOGY | Facility: CLINIC | Age: 66
End: 2021-07-14
Payer: COMMERCIAL

## 2021-07-14 VITALS
DIASTOLIC BLOOD PRESSURE: 74 MMHG | SYSTOLIC BLOOD PRESSURE: 142 MMHG | WEIGHT: 209.2 LBS | HEIGHT: 65 IN | HEART RATE: 74 BPM | BODY MASS INDEX: 34.85 KG/M2

## 2021-07-14 DIAGNOSIS — K64.2 PROLAPSED INTERNAL HEMORRHOIDS, GRADE 3: Primary | ICD-10-CM

## 2021-07-14 PROCEDURE — 46221 LIGATION OF HEMORRHOID(S): CPT | Performed by: INTERNAL MEDICINE

## 2021-08-03 ENCOUNTER — OFFICE VISIT (OUTPATIENT)
Dept: PHYSICAL THERAPY | Facility: CLINIC | Age: 66
End: 2021-08-03
Payer: COMMERCIAL

## 2021-08-03 ENCOUNTER — APPOINTMENT (EMERGENCY)
Dept: RADIOLOGY | Facility: HOSPITAL | Age: 66
End: 2021-08-03
Payer: COMMERCIAL

## 2021-08-03 ENCOUNTER — APPOINTMENT (EMERGENCY)
Dept: CT IMAGING | Facility: HOSPITAL | Age: 66
End: 2021-08-03
Payer: COMMERCIAL

## 2021-08-03 ENCOUNTER — HOSPITAL ENCOUNTER (EMERGENCY)
Facility: HOSPITAL | Age: 66
Discharge: HOME | End: 2021-08-03
Attending: EMERGENCY MEDICINE | Admitting: EMERGENCY MEDICINE
Payer: COMMERCIAL

## 2021-08-03 VITALS
HEART RATE: 71 BPM | RESPIRATION RATE: 19 BRPM | DIASTOLIC BLOOD PRESSURE: 74 MMHG | BODY MASS INDEX: 33.11 KG/M2 | WEIGHT: 206 LBS | HEIGHT: 66 IN | SYSTOLIC BLOOD PRESSURE: 130 MMHG | OXYGEN SATURATION: 97 % | TEMPERATURE: 98.3 F

## 2021-08-03 DIAGNOSIS — R42 VERTIGO: Primary | ICD-10-CM

## 2021-08-03 DIAGNOSIS — E04.1 THYROID NODULE: ICD-10-CM

## 2021-08-03 DIAGNOSIS — M72.2 PLANTAR FASCIITIS, LEFT: Primary | ICD-10-CM

## 2021-08-03 LAB
ALBUMIN SERPL BCP-MCNC: 3.8 G/DL (ref 3.5–5)
ALP SERPL-CCNC: 100 U/L (ref 46–116)
ALT SERPL W P-5'-P-CCNC: 35 U/L (ref 12–78)
ANION GAP SERPL CALCULATED.3IONS-SCNC: 5 MMOL/L (ref 4–13)
AST SERPL W P-5'-P-CCNC: 16 U/L (ref 5–45)
BASOPHILS # BLD AUTO: 0.04 THOUSANDS/ΜL (ref 0–0.1)
BASOPHILS NFR BLD AUTO: 1 % (ref 0–1)
BILIRUB SERPL-MCNC: 0.4 MG/DL (ref 0.2–1)
BUN SERPL-MCNC: 14 MG/DL (ref 5–25)
CALCIUM SERPL-MCNC: 9.6 MG/DL (ref 8.3–10.1)
CHLORIDE SERPL-SCNC: 104 MMOL/L (ref 100–108)
CO2 SERPL-SCNC: 31 MMOL/L (ref 21–32)
CREAT SERPL-MCNC: 0.73 MG/DL (ref 0.6–1.3)
EOSINOPHIL # BLD AUTO: 0.18 THOUSAND/ΜL (ref 0–0.61)
EOSINOPHIL NFR BLD AUTO: 3 % (ref 0–6)
ERYTHROCYTE [DISTWIDTH] IN BLOOD BY AUTOMATED COUNT: 12.5 % (ref 11.6–15.1)
GFR SERPL CREATININE-BSD FRML MDRD: 86 ML/MIN/1.73SQ M
GLUCOSE SERPL-MCNC: 164 MG/DL (ref 65–140)
HCT VFR BLD AUTO: 39.2 % (ref 34.8–46.1)
HGB BLD-MCNC: 12.4 G/DL (ref 11.5–15.4)
IMM GRANULOCYTES # BLD AUTO: 0.02 THOUSAND/UL (ref 0–0.2)
IMM GRANULOCYTES NFR BLD AUTO: 0 % (ref 0–2)
LYMPHOCYTES # BLD AUTO: 2.2 THOUSANDS/ΜL (ref 0.6–4.47)
LYMPHOCYTES NFR BLD AUTO: 34 % (ref 14–44)
MCH RBC QN AUTO: 28.6 PG (ref 26.8–34.3)
MCHC RBC AUTO-ENTMCNC: 31.6 G/DL (ref 31.4–37.4)
MCV RBC AUTO: 91 FL (ref 82–98)
MONOCYTES # BLD AUTO: 0.36 THOUSAND/ΜL (ref 0.17–1.22)
MONOCYTES NFR BLD AUTO: 6 % (ref 4–12)
NEUTROPHILS # BLD AUTO: 3.76 THOUSANDS/ΜL (ref 1.85–7.62)
NEUTS SEG NFR BLD AUTO: 56 % (ref 43–75)
NRBC BLD AUTO-RTO: 0 /100 WBCS
PLATELET # BLD AUTO: 203 THOUSANDS/UL (ref 149–390)
PMV BLD AUTO: 9.2 FL (ref 8.9–12.7)
POTASSIUM SERPL-SCNC: 4 MMOL/L (ref 3.5–5.3)
PROT SERPL-MCNC: 7.5 G/DL (ref 6.4–8.2)
RBC # BLD AUTO: 4.33 MILLION/UL (ref 3.81–5.12)
SODIUM SERPL-SCNC: 140 MMOL/L (ref 136–145)
TROPONIN I SERPL-MCNC: <0.02 NG/ML
WBC # BLD AUTO: 6.56 THOUSAND/UL (ref 4.31–10.16)

## 2021-08-03 PROCEDURE — 99285 EMERGENCY DEPT VISIT HI MDM: CPT | Performed by: EMERGENCY MEDICINE

## 2021-08-03 PROCEDURE — G1004 CDSM NDSC: HCPCS

## 2021-08-03 PROCEDURE — 71045 X-RAY EXAM CHEST 1 VIEW: CPT

## 2021-08-03 PROCEDURE — 80053 COMPREHEN METABOLIC PANEL: CPT | Performed by: EMERGENCY MEDICINE

## 2021-08-03 PROCEDURE — 93005 ELECTROCARDIOGRAM TRACING: CPT

## 2021-08-03 PROCEDURE — 70498 CT ANGIOGRAPHY NECK: CPT

## 2021-08-03 PROCEDURE — 96360 HYDRATION IV INFUSION INIT: CPT

## 2021-08-03 PROCEDURE — 36415 COLL VENOUS BLD VENIPUNCTURE: CPT

## 2021-08-03 PROCEDURE — 84484 ASSAY OF TROPONIN QUANT: CPT | Performed by: EMERGENCY MEDICINE

## 2021-08-03 PROCEDURE — 70496 CT ANGIOGRAPHY HEAD: CPT

## 2021-08-03 PROCEDURE — 85025 COMPLETE CBC W/AUTO DIFF WBC: CPT | Performed by: EMERGENCY MEDICINE

## 2021-08-03 PROCEDURE — 99285 EMERGENCY DEPT VISIT HI MDM: CPT

## 2021-08-03 PROCEDURE — 97140 MANUAL THERAPY 1/> REGIONS: CPT

## 2021-08-03 RX ORDER — MECLIZINE HCL 12.5 MG/1
25 TABLET ORAL ONCE
Status: COMPLETED | OUTPATIENT
Start: 2021-08-03 | End: 2021-08-03

## 2021-08-03 RX ORDER — MECLIZINE HCL 12.5 MG/1
12.5 TABLET ORAL 3 TIMES DAILY PRN
Qty: 30 TABLET | Refills: 0 | Status: SHIPPED | OUTPATIENT
Start: 2021-08-03

## 2021-08-03 RX ADMIN — MECLIZINE 25 MG: 12.5 TABLET ORAL at 21:15

## 2021-08-03 RX ADMIN — SODIUM CHLORIDE 1000 ML: 0.9 INJECTION, SOLUTION INTRAVENOUS at 18:41

## 2021-08-03 RX ADMIN — IOHEXOL 85 ML: 350 INJECTION, SOLUTION INTRAVENOUS at 19:16

## 2021-08-03 NOTE — PROGRESS NOTES
Daily Note     Today's date: 8/3/2021  Patient name: Pat Leiva  :   MRN: [id number]  Referring provider: Carlos Espitia DPM  Dx:   Encounter Diagnosis     ICD-10-CM    1  Plantar fasciitis, left  M72 2                   Subjective: pt states that her foot is better than it was initially but that she is still having ksenia at heel and that being away she had to do steps often and that did not help  Objective: See treatment diary below      Assessment: Tolerated treatment with tightness in great toe and mid arch region    Patient would benefit from continued PT  Pt is performing exercises of stretches and ice to plantar surface of foot  Plan: Continue per plan of care        Precautions: none      Manuals  7/7 7/13 8/3         ktape I strip plantar fascia DB Split for PF-DL           Ktape compression I strip fat pad DB            Correction strip distal to heel  DL           Navicular, tc, 1st ray mobs   DB 1st ray -DL         stm PF  DL DB DL         Neuro Re-Ed             SLS nv            Balance brds nv x20 ea                                                                            Ther Ex             bike nv lv1 6' nv          Ankle 4 way  Green x10 ea            Long sitting PF towel stretch 20''x5  20''x5          HR/TR nv                                                                Ther Activity                                       Gait Training                                       Modalities

## 2021-08-03 NOTE — ED PROVIDER NOTES
History  Chief Complaint   Patient presents with    Dizziness     started last night; no vomitting; pt states she was outside this morning and stood up from the grass but then fell back to ground, denies hitting head, denies LOC, no blood thinners      This is a 57-year-old female who presents for evaluation of dizziness that started last evening while laying in bed and got worse when she stood up no nausea vomiting no chest pain or shortness of breath no headache  Patient is awake alert without any focal neurologic deficits a [place] coma Scale of 15 NIH stroke scale of 0  No nystagmus test of skew is negative patient does get dizzy with turning of her head  Denies any change in hearing or vision no trouble speaking or swallowing  History provided by:  Patient and spouse  Medical Problem  Location:   generalized  Quality:   dizziness  Severity:  Moderate  Onset quality:  Sudden  Duration:  1 day  Timing:  Intermittent  Chronicity:  New  Context:   dizziness that started last evening  Worsened by:   standing and quick turns      Prior to Admission Medications   Prescriptions Last Dose Informant Patient Reported? Taking? Multiple Vitamins-Minerals (AIRBORNE GUMMIES PO)  Self Yes No   Sig: Take by mouth   Patient not taking: Reported on 7/14/2021   Polyethyl Glycol-Propyl Glycol (Systane) 0 4-0 3 % GEL  Self Yes No   Sig: Apply to eye   ascorbic acid (VITAMIN C) 250 mg tablet  Self Yes No   Sig: Take 125 mg by mouth daily   aspirin 81 mg chewable tablet  Self Yes No   Sig: Chew 81 mg every other day    cholecalciferol (VITAMIN D3) 1,000 units tablet  Self Yes No   Sig: Take 2,000 Units by mouth daily   escitalopram (LEXAPRO) 10 mg tablet  Self Yes No   Sig: Take 10 mg by mouth daily 5 mg  And weaning off   Patient not taking: Reported on 7/14/2021   halobetasol (ULTRAVATE) 0 05 % ointment  Self No No   Sig: Apply daily     Patient not taking: Reported on 7/14/2021   losartan (COZAAR) 50 mg tablet  Self No No Sig: TAKE 1 TABLET BY MOUTH EVERY DAY   metFORMIN (GLUCOPHAGE) 1000 MG tablet  Self No No   Sig: TAKE 1 TABLET BY MOUTH TWICE A DAY WITH MEALS   pantoprazole (PROTONIX) 40 mg tablet  Self No No   Sig: TAKE 1 TABLET BY MOUTH EVERY DAY   simvastatin (ZOCOR) 40 mg tablet  Self Yes No   Sig: Take 40 mg by mouth every evening      Facility-Administered Medications: None       Past Medical History:   Diagnosis Date    Arthritis     COPD [facility]chronic obstructive pulmonary disease) ([facility] [address] )     Diabetes mellitus (HCC)     GERD (gastroesophageal reflux disease)     Renal disorder     Sleep apnea        Past Surgical History:   Procedure Laterality Date     SECTION      COLONOSCOPY      RENAL CYST EXCISION      TONSILLECTOMY         Family History   Problem Relation Age of Onset   Ameena Noble COPD Mother     Lung cancer Mother     Lung cancer Father     Hypertension Father     No Known Problems Sister     Hypertension Brother     Colon polyps Brother     Kidney disease Brother     Colon polyps Brother     Stroke Brother     Kidney disease Brother     Hypertension Brother     Hyperlipidemia Brother     Gestational diabetes Sister     Colon cancer Maternal Aunt      I have reviewed and agree with the history as documented  E-Cigarette/Vaping    E-Cigarette Use Never User      E-Cigarette/Vaping Substances    Nicotine No     THC No     CBD No     Flavoring No     Other No     Unknown No      Social History     Tobacco Use    Smoking status: Former Smoker     Packs/day: 0 50     Types: Cigarettes     Quit date: 10/29/1981     Years since quittin 7    Smokeless tobacco: Never Used   Vaping Use    Vaping Use: Never used   Substance Use Topics    Alcohol use: No    Drug use: No       Review of Systems   Neurological: Positive for dizziness  All other systems reviewed and are negative  Physical Exam  Physical Exam  Vitals and nursing note reviewed     Constitutional:       General: She is not in acute distress  Appearance: She is not ill-appearing, toxic-appearing or diaphoretic  HENT:      Head: Normocephalic and atraumatic  Right Ear: Tympanic membrane, ear canal and external ear normal       Left Ear: Tympanic membrane, ear canal and external ear normal       Nose: Nose normal    Eyes:      General: No scleral icterus  Right eye: No discharge  Left eye: No discharge  Extraocular Movements: Extraocular movements intact  Pupils: Pupils are equal, round, and reactive to light  Cardiovascular:      Rate and Rhythm: Normal rate and regular rhythm  Pulses: Normal pulses  Heart sounds: Normal heart sounds  No murmur heard  No friction rub  No gallop  Pulmonary:      Effort: Pulmonary effort is normal  No respiratory distress  Breath sounds: Normal breath sounds  No stridor  No wheezing, rhonchi or rales  Abdominal:      General: Abdomen is flat  Bowel sounds are normal  There is no distension  Palpations: Abdomen is soft  Tenderness: There is no abdominal tenderness  There is no guarding or rebound  Musculoskeletal:         General: No swelling, tenderness, deformity or signs of injury  Normal range of motion  Cervical back: Normal range of motion and neck supple  No rigidity or tenderness  Right lower leg: No edema  Left lower leg: No edema  Skin:     General: Skin is warm and dry  Coloration: Skin is not jaundiced or pale  Findings: No bruising, erythema or rash  Neurological:      General: No focal deficit present  Mental Status: She is alert and oriented to person, place, and time  Cranial Nerves: No cranial nerve deficit  Sensory: No sensory deficit  Coordination: Coordination normal       Gait: Gait normal       Deep Tendon Reflexes: Reflexes normal    Psychiatric:         Mood and Affect: Mood normal          Behavior: Behavior normal          Thought Content:  Thought content normal          Vital Signs  ED Triage Vitals [08/03/21 1739]   Temperature Pulse Respirations Blood Pressure SpO2   98 3 °F (36 8 °C) 75 18 162/74 96 %      Temp Source Heart Rate Source Patient Position - Orthostatic VS BP Location FiO2 (%)   Oral Monitor Sitting Left arm --      Pain Score       --           Vitals:    08/03/21 1739 08/03/21 1930 08/03/21 2030   BP: 162/74  164/74   Pulse: 75 72 69   Patient Position - Orthostatic VS: Sitting  Lying         Visual Acuity      ED Medications  Medications   meclizine (ANTIVERT) tablet 25 mg (has no administration in time range)   sodium chloride 0 9 % bolus 1,000 mL (0 mL Intravenous Stopped 8/3/21 1941)   iohexol (OMNIPAQUE) 350 MG/ML injection (MULTI-DOSE) 100 mL (85 mL Intravenous Given 8/3/21 1916)       Diagnostic Studies  Results Reviewed     Procedure Component Value Units Date/Time    Comprehensive metabolic panel [[id number]]  (Abnormal) Collected: 08/03/21 1825    Lab Status: Final result Specimen: Blood from Arm, Right Updated: 08/03/21 1906     Sodium 140 mmol/L      Potassium 4 0 mmol/L      Chloride 104 mmol/L      CO2 31 mmol/L      ANION GAP 5 mmol/L      BUN 14 mg/dL      Creatinine 0 73 mg/dL      Glucose 164 mg/dL      Calcium 9 6 mg/dL      AST 16 U/L      ALT 35 U/L      Alkaline Phosphatase 100 U/L      Total Protein 7 5 g/dL      Albumin 3 8 g/dL      Total Bilirubin 0 40 mg/dL      eGFR 86 ml/min/1 73sq m     Narrative:      Juan Manuel guidelines for Chronic Kidney Disease (CKD):     Stage 1 with normal or high GFR (GFR > 90 mL/min/1 73 square meters)    Stage 2 Mild CKD (GFR = 60-89 mL/min/1 73 square meters)    Stage 3A Moderate CKD (GFR = 45-59 mL/min/1 73 square meters)    Stage 3B Moderate CKD (GFR = 30-44 mL/min/1 73 square meters)    Stage 4 Severe CKD (GFR = 15-29 mL/min/1 73 square meters)    Stage 5 End Stage CKD (GFR <15 mL/min/1 73 square meters)  Note: GFR calculation is accurate only with a steady state creatinine    Troponin I [[id number]]  (Normal) Collected: 08/03/21 1825    Lab Status: Final result Specimen: Blood from Arm, Right Updated: 08/03/21 1851     Troponin I <0 02 ng/mL     CBC and differential [[id number]] Collected: 08/03/21 1825    Lab Status: Final result Specimen: Blood from Arm, Right Updated: 08/03/21 1830     WBC 6 56 Thousand/uL      RBC 4 33 Million/uL      Hemoglobin 12 4 g/dL      Hematocrit 39 2 %      MCV 91 fL      MCH 28 6 pg      MCHC 31 6 g/dL      RDW 12 5 %      MPV 9 2 fL      Platelets 479 Thousands/uL      nRBC 0 /100 WBCs      Neutrophils Relative 56 %      Immat GRANS % 0 %      Lymphocytes Relative 34 %      Monocytes Relative 6 %      Eosinophils Relative 3 %      Basophils Relative 1 %      Neutrophils Absolute 3 76 Thousands/µL      Immature Grans Absolute 0 02 Thousand/uL      Lymphocytes Absolute 2 20 Thousands/µL      Monocytes Absolute 0 36 Thousand/µL      Eosinophils Absolute 0 18 Thousand/µL      Basophils Absolute 0 04 Thousands/µL                  CTA head and neck with and without contrast   Final Result by Tom Nunez MD (08/03 2047)         1  No evidence of acute intracranial hemorrhage  2  No evidence of hemodynamic significant stenosis, aneurysm or dissection  3  Approximately 2 cm left thyroid nodule  Incidental discovery of one or more thyroid nodule(s) measuring more than 1 5 cm and without suspicious features is noted in this patient who is above 28years old; according to guidelines published in the    February 2015 white paper on incidental thyroid nodules in the Journal of the [employer] of Radiology Angela Lozano), further characterization with thyroid ultrasound is recommended                       Workstation performed: [id number]         XR chest 1 view portable   ED Interpretation by Mk Bonner DO (08/03 1955)   No acute infiltrate or pneumothorax or congestive heart failure                 Procedures  ECG 12 Lead Documentation Only    Date/Time: 8/3/2021 7:14 PM  Performed by: Neville Carrington DO  Authorized by: Neville Carrington DO     ECG reviewed by me, the ED Provider: yes    Patient location:  ED  Rate:     ECG rate:  69  Rhythm:     Rhythm: sinus rhythm    Conduction:     Conduction: normal    T waves:     T waves: normal               ED Course                 Stroke Assessment     Row Name 08/03/21 1838             NIH Stroke Scale    Interval  Baseline      Level of Consciousness (1a )  0      LOC Questions (1b )  0      LOC Commands (1c )  0      Best Gaze (2 )  0      Visual (3 )  0      Facial Palsy (4 )  0      Motor Arm, Left (5a )  0      Motor Arm, Right (5b )  0      Motor Leg, Left (6a )  0      Motor Leg, Right (6b )  0      Limb Ataxia (7 )  0      Sensory (8 )  0      Best Language (9 )  0      Dysarthria (10 )  0      Extinction and Inattention (11 ) (Formerly Neglect)  0      Total  0                    SBIRT 22yo+      Most Recent Value   SBIRT (25 yo +)   In order to provide better care to our patients, we are screening all of our patients for alcohol and drug use  Would it be okay to ask you these screening questions? Yes Filed at: 08/03/2021 1900   Initial Alcohol Screen: US AUDIT-C    1  How often do you have a drink containing alcohol?  0 Filed at: 08/03/2021 1900   2  How many drinks containing alcohol do you have on a typical day you are drinking? 0 Filed at: 08/03/2021 1900   3b  FEMALE Any Age, or MALE 65+: How often do you have 4 or more drinks on one occassion? 0 Filed at: 08/03/2021 1900   Audit-C Score  0 Filed at: 08/03/2021 1900   MAXIMUS: How many times in the past year have you    Used an illegal drug or used a prescription medication for non-medical reasons?   Never Filed at: 08/03/2021 1900                    MDM  Number of Diagnoses or Management Options  Diagnosis management comments:  Dizziness differential includes CNS lesion versus peripheral vertigo workup in progress including blood work and CT scan no current focal neurologic deficit       Amount and/or Complexity of Data Reviewed  Clinical lab tests: ordered  Tests in the radiology section of CPT®: ordered        Disposition  Final diagnoses:   Vertigo   Thyroid nodule     Time reflects when diagnosis was documented in both MDM as applicable and the Disposition within this note     Time User Action Codes Description Comment    8/3/2021  9:00 PM Pratik Mahan [R42] Vertigo     8/3/2021  9:00 PM Adelitabrenden Segura Add [E04 1] Thyroid nodule       ED Disposition     ED Disposition Condition Date/Time Comment    Discharge Stable Tue Aug 3, 2021  9:00 PM Brodyterrie Cali discharge to home/self care  Follow-up Information     Follow up With Specialties Details Why Contact Info    Junior Mckeon MD Family Medicine In 2 days follow-up recheck possible physical therapy referral and get set up for ultrasound of the thyroid gland [facility] [address] [facility] [address]  [phone]            Patient's Medications   Discharge Prescriptions    MECLIZINE (ANTIVERT) 12 5 MG TABLET    Take 1 tablet (12 5 mg total) by mouth 3 (three) times a day as needed for dizziness       Start Date: 8/3/2021  End Date: --       Order Dose: 12 5 mg       Quantity: 30 tablet    Refills: 0     No discharge procedures on file      PDMP Review     None          ED Provider  Electronically Signed by           Elysia Walls DO  08/03/21 0063

## 2021-08-04 LAB
ATRIAL RATE: 69 BPM
P AXIS: 73 DEGREES
PR INTERVAL: 188 MS
QRS AXIS: 45 DEGREES
QRSD INTERVAL: 78 MS
QT INTERVAL: 390 MS
QTC INTERVAL: 417 MS
T WAVE AXIS: 94 DEGREES
VENTRICULAR RATE: 69 BPM

## 2021-08-04 PROCEDURE — 93010 ELECTROCARDIOGRAM REPORT: CPT | Performed by: INTERNAL MEDICINE

## 2021-08-04 NOTE — DISCHARGE INSTRUCTIONS
He have a thyroid nodule seen on CT scan and need an ultrasound of the thyroid gland set up through your primary care physician next week to rule out cancerous lesion   You may also do the Epley maneuvers for dizziness look up the positions on YouTube

## 2021-08-10 ENCOUNTER — OFFICE VISIT (OUTPATIENT)
Dept: PHYSICAL THERAPY | Facility: CLINIC | Age: 66
End: 2021-08-10
Payer: COMMERCIAL

## 2021-08-10 ENCOUNTER — OFFICE VISIT (OUTPATIENT)
Dept: ENDOCRINOLOGY | Facility: HOSPITAL | Age: 66
End: 2021-08-10
Payer: COMMERCIAL

## 2021-08-10 VITALS
HEIGHT: 66 IN | DIASTOLIC BLOOD PRESSURE: 80 MMHG | BODY MASS INDEX: 33.75 KG/M2 | SYSTOLIC BLOOD PRESSURE: 132 MMHG | HEART RATE: 74 BPM | WEIGHT: 210 LBS

## 2021-08-10 DIAGNOSIS — M72.2 PLANTAR FASCIITIS, LEFT: Primary | ICD-10-CM

## 2021-08-10 DIAGNOSIS — E83.52 HYPERCALCEMIA: ICD-10-CM

## 2021-08-10 DIAGNOSIS — E04.1 THYROID NODULE: ICD-10-CM

## 2021-08-10 DIAGNOSIS — E11.9 TYPE 2 DIABETES MELLITUS WITHOUT COMPLICATION, WITHOUT LONG-TERM CURRENT USE OF INSULIN (HCC): Primary | ICD-10-CM

## 2021-08-10 PROCEDURE — 99214 OFFICE O/P EST MOD 30 MIN: CPT | Performed by: PHYSICIAN ASSISTANT

## 2021-08-10 PROCEDURE — 97140 MANUAL THERAPY 1/> REGIONS: CPT | Performed by: PHYSICAL THERAPIST

## 2021-08-10 PROCEDURE — 97110 THERAPEUTIC EXERCISES: CPT | Performed by: PHYSICAL THERAPIST

## 2021-08-10 NOTE — PROGRESS NOTES
Daily Note     Today's date: 8/10/2021  Patient name: Gabe Lopez  :   MRN: [id number]  Referring provider: Vazquez Sanchez DPM  Dx:   Encounter Diagnosis     ICD-10-CM    1  Plantar fasciitis, left  M72 2                   Subjective: pt notes that she has been having pain in the foot again, but notes that she doesn't want to get another injection  She also notes that she will be away again next week, and will not be able to attend PT  Objective: See treatment diary below      Assessment: advised pt that with out an injection or consistent PT that improvement will be hard because we will not be able to correct what led to the the pain  Did add toe yoga to HEP today in order to strengthen foot intrinsics       Plan: Continue per plan of care        Precautions: none      Manuals  7/7 7/13 8/3 8/10        ktape I strip plantar fascia DB Split for PF-DL           Ktape compression I strip fat pad DB            Correction strip distal to heel  DL           Navicular, tc, 1st ray mobs   DB 1st ray -DL DB        stm PF  DL DB DL DB        Neuro Re-Ed             SLS nv            Balance brds nv x20 ea                                                                            Ther Ex             bike nv lv1 6' nv          Ankle 4 way  Green x10 ea            Long sitting PF towel stretch 20''x5  20''x5  20''x5        HR/TR nv    2*10        Toe spreading     2x10                                               Ther Activity                                       Gait Training                                       Modalities

## 2021-08-10 NOTE — PATIENT INSTRUCTIONS
Continue to monitor diet, and maintain physical activity  Make sure to drink plenty water throughout the day  Continue metformin 1000 mg twice a day  Continue monitoring blood sugar daily  Four thyroid nodule will order an ultrasound for further evaluation  Pending results may need further testing      Follow-up in 6 months with lab work completed prior to visit

## 2021-08-10 NOTE — PROGRESS NOTES
Jazz Hill 76 y o  female MRN: [id number]    Encounter: [id number]      Assessment/Plan     Assessment: This is a 76y o -year-old female with type 2 diabetes with hypertension and hyperlipidemia, and thyroid nodule  Plan:  1  Type 2 diabetes:  Most recent hemoglobin A1c remains stable at 6 4  Will continue with metformin 1000 mg twice a day  Continue to check fasting blood sugar daily  Contact the office if there is any concerns for increasing numbers  Follow-up in 6 months with lab work completed prior to visit  2  Thyroid nodule: Thyroid nodules noted on recent CT scan  Ordered an ultrasound for further evaluation  Once results are in will make further recommendations  3  Hypertension:  Normotensive in the office today  Kidney function remains stable  Electrolytes normal   Continue with current medications  Repeat CMP prior to next office visit  4  Hyperlipidemia:  Triglycerides slightly elevated  Continue with current medications  Will continue monitor at this time  5  History of mild hypercalcemia:  Recent lab work shows corrected calcium within normal range  PTH and vitamin-D were normal   Will continue to monitor at this time  CC:  Type 2 diabetes follow-up    History of Present Illness     HPI:  Jazz Hill is a 76 y o  female with history of type 2 diabetes for about 5 years who presents for a follow-up appointment  She is maintained on oral agents at home including metformin 1000 mg twice a day  She denies any polyuria, polydipsia, nocturia, blurry vision  She denies history of peripheral neuropathy, nephropathy, retinopathy  She was seen in the ED on August 3, 2021 for an episode of dizziness  Had a CTA of head and neck which did not show any acute intracranial hemorrhage or significant stenosis  However, an approximately 2 cm left thyroid nodule was noted  She has no history of thyroid nodules, or thyroid disease    Sister had thyroidectomy due to hyperthyroidism  Denies any history of head or neck radiation      Hypoglycemic episodes: No       The patient's last eye exam was in 2020  Last diabetic foot exam was completed May 2020 in endocrine office      Blood Sugar/Glucometer/Pump/CGM review:  Blood sugars in the morning are typically around 130-140  No hypoglycemia      For hyperlipidemia, she is maintained on atorvastatin 40 mg daily without myalgias  For hypertension she continues on losartan 50 mg daily  Review of Systems   Constitutional: Negative for activity change, appetite change, diaphoresis, fatigue and unexpected weight change  HENT: Negative for sore throat, trouble swallowing and voice change  Eyes: Negative for visual disturbance  Respiratory: Negative for chest tightness and shortness of breath  Cardiovascular: Negative for chest pain, palpitations and leg swelling  Gastrointestinal: Negative for abdominal pain, constipation, diarrhea, nausea and vomiting  Endocrine: Negative for cold intolerance, heat intolerance, polydipsia, polyphagia and polyuria  Genitourinary: Negative for frequency  Skin: Negative for rash and wound  Neurological: Negative for dizziness, tremors, weakness, light-headedness, numbness and headaches  Hematological: Negative for adenopathy  Psychiatric/Behavioral: Negative for dysphoric mood and sleep disturbance  The patient is not nervous/anxious          Historical Information   Past Medical History:   Diagnosis Date    Arthritis     [facility]     Diabetes mellitus [facility]     GERD (gastroesophageal reflux disease)     Renal disorder     Sleep apnea      Past Surgical History:   Procedure Laterality Date     SECTION      COLONOSCOPY      RENAL CYST EXCISION      TONSILLECTOMY       Social History   Social History     Substance and Sexual Activity   Alcohol Use No     Social History     Substance and Sexual Activity   Drug Use No     Social History     Tobacco Use   Smoking Status Former Smoker    Packs/day: 0 50    Types: Cigarettes    Quit date: 10/29/1981    Years since quittin 8   Smokeless Tobacco Never Used     Family History:   Family History   Problem Relation Age of Onset   Adam Toussaint COPD Mother    Adam Toussaint Lung cancer Mother     Lung cancer Father     Hypertension Father     No Known Problems Sister     Hypertension Brother     Colon polyps Brother     Kidney disease Brother     Colon polyps Brother     Stroke Brother     Kidney disease Brother     Hypertension Brother     Hyperlipidemia Brother     Gestational diabetes Sister     Colon cancer Maternal Aunt        Meds/Allergies   Current Outpatient Medications   Medication Sig Dispense Refill    ascorbic acid (VITAMIN C) 250 mg tablet Take 125 mg by mouth daily      aspirin 81 mg chewable tablet Chew 81 mg every other day       cholecalciferol (VITAMIN D3) 1,000 units tablet Take 2,000 Units by mouth daily      escitalopram (LEXAPRO) 10 mg tablet Take 10 mg by mouth daily 5 mg  And weaning off (Patient not taking: Reported on 2021)      halobetasol (ULTRAVATE) 0 05 % ointment Apply daily  (Patient not taking: Reported on 2021) 1 Tube 0    losartan (COZAAR) 50 mg tablet TAKE 1 TABLET BY MOUTH EVERY DAY 90 tablet 1    meclizine (ANTIVERT) 12 5 MG tablet Take 1 tablet (12 5 mg total) by mouth 3 (three) times a day as needed for dizziness 30 tablet 0    metFORMIN (GLUCOPHAGE) 1000 MG tablet TAKE 1 TABLET BY MOUTH TWICE A DAY WITH MEALS 180 tablet 3    Multiple Vitamins-Minerals (AIRBORNE GUMMIES PO) Take by mouth (Patient not taking: Reported on 2021)      pantoprazole (PROTONIX) 40 mg tablet TAKE 1 TABLET BY MOUTH EVERY DAY 90 tablet 5    Polyethyl Glycol-Propyl Glycol (Systane) 0 4-0 3 % GEL Apply to eye      simvastatin (ZOCOR) 40 mg tablet Take 40 mg by mouth every evening       No current facility-administered medications for this visit  Allergies   Allergen Reactions    Demerol [Meperidine] GI Intolerance     Other reaction(s): N/V    Morphine GI Intolerance     Other reaction(s): N/V    Meloxicam Swelling       Objective   Vitals: There were no vitals taken for this visit  Physical Exam  Vitals and nursing note reviewed  Constitutional:       General: She is not in acute distress  Appearance: Normal appearance  She is not diaphoretic  HENT:      Head: Normocephalic and atraumatic  Eyes:      General: No scleral icterus  Extraocular Movements: Extraocular movements intact  Conjunctiva/sclera: Conjunctivae normal       Pupils: Pupils are equal, round, and reactive to light  Cardiovascular:      Rate and Rhythm: Normal rate and regular rhythm  Pulses: no weak pulses          Dorsalis pedis pulses are 2+ on the right side and 2+ on the left side  Posterior tibial pulses are 2+ on the right side and 2+ on the left side  Heart sounds: No murmur heard  Pulmonary:      Effort: Pulmonary effort is normal  No respiratory distress  Breath sounds: Normal breath sounds  No wheezing  Musculoskeletal:      Cervical back: Normal range of motion  Right lower leg: No edema  Left lower leg: No edema  Feet:      Right foot:      Skin integrity: No ulcer, skin breakdown, erythema, warmth, callus or dry skin  Left foot:      Skin integrity: No ulcer, skin breakdown, erythema, warmth, callus or dry skin  Lymphadenopathy:      Cervical: No cervical adenopathy  Neurological:      Mental Status: She is alert and oriented to person, place, and time  Mental status is at baseline  Sensory: No sensory deficit  Gait: Gait normal    Psychiatric:         Mood and Affect: Mood normal          Behavior: Behavior normal          Thought Content: Thought content normal      Patient's shoes and socks removed  Right Foot/Ankle   Right Foot Inspection  Skin Exam: skin normal and skin intact no dry skin, no warmth, no callus, no erythema, no maceration, no abnormal color, no pre-ulcer, no ulcer and no callus                          Toe Exam: ROM and strength within normal limitsno tenderness, erythema and  no right toe deformity  Sensory     Proprioception: intact   Monofilament testing: intact  Vascular  Capillary refills: < 3 seconds  The right DP pulse is 2+  The right PT pulse is 2+  Left Foot/Ankle  Left Foot Inspection  Skin Exam: skin normal and skin intactno dry skin, no warmth, no erythema, no maceration, normal color, no pre-ulcer, no ulcer and no callus                         Toe Exam: ROM and strength within normal limitsno tenderness, no erythema and no left toe deformity                   Sensory     Proprioception: intact  Monofilament: intact  Vascular  Capillary refills: < 3 seconds  The left DP pulse is 2+  The left PT pulse is 2+  Assign Risk Category:  No deformity present; No loss of protective sensation; No weak pulses       Risk: 0        The history was obtained from the review of the chart, patient      Lab Results:   Lab Results   Component Value Date/Time    Hemoglobin A1C 6 4 (H) 05/14/2021 09:18 AM    Hemoglobin A1C 6 3 (H) 02/23/2021 08:19 AM    Hemoglobin A1C 6 4 (H) 11/13/2020 08:31 AM    WBC 6 56 08/03/2021 06:25 PM    White Blood Cell Count 6 4 02/23/2021 08:19 AM    White Blood Cell Count 6 8 11/13/2020 08:31 AM    Hemoglobin 12 4 08/03/2021 06:25 PM    Hemoglobin 11 9 02/23/2021 08:19 AM    Hemoglobin 11 6 11/13/2020 08:31 AM    HCT 37 9 02/23/2021 08:19 AM    HCT 35 8 11/13/2020 08:31 AM    Hematocrit 39 2 08/03/2021 06:25 PM    MCV 91 08/03/2021 06:25 PM    MCV 88 02/23/2021 08:19 AM    MCV 89 11/13/2020 08:31 AM    Platelet Count 794 87/33/4516 08:19 AM    Platelet Count 322 62/94/6848 08:31 AM    Platelets 801 58/58/8026 06:25 PM    BUN 14 08/03/2021 06:25 PM    BUN 12 05/14/2021 09:18 AM    BUN 13 02/23/2021 08:19 AM    BUN 13 11/13/2020 08:31 AM    Potassium 4 0 08/03/2021 06:25 PM    Potassium 4 3 05/14/2021 09:18 AM    Potassium 4 6 02/23/2021 08:19 AM    Potassium 4 1 11/13/2020 08:31 AM    Chloride 104 08/03/2021 06:25 PM    Chloride 104 05/14/2021 09:18 AM    Chloride 103 02/23/2021 08:19 AM    Chloride 102 11/13/2020 08:31 AM    CO2 31 08/03/2021 06:25 PM    CO2 27 05/14/2021 09:18 AM    CO2 25 02/23/2021 08:19 AM    CO2 26 11/13/2020 08:31 AM    Creatinine 0 73 08/03/2021 06:25 PM    Creatinine 0 67 05/14/2021 09:18 AM    Creatinine 0 69 02/23/2021 08:19 AM    Creatinine 0 71 11/13/2020 08:31 AM    AST 16 08/03/2021 06:25 PM    AST 14 05/14/2021 09:18 AM    AST 13 02/23/2021 08:19 AM    AST 13 11/13/2020 08:31 AM    ALT 35 08/03/2021 06:25 PM    ALT 19 05/14/2021 09:18 AM    ALT 20 02/23/2021 08:19 AM    ALT 16 11/13/2020 08:31 AM    Albumin 3 8 08/03/2021 06:25 PM    Albumin 4 4 05/14/2021 09:18 AM    Albumin 4 2 02/23/2021 08:19 AM    Albumin 4 2 11/13/2020 08:31 AM    Globulin, Total 2 4 05/14/2021 09:18 AM    Globulin, Total 2 5 02/23/2021 08:19 AM    Globulin, Total 2 5 11/13/2020 08:31 AM    HDL 37 (L) 05/14/2021 09:18 AM    HDL 41 02/23/2021 08:19 AM    HDL 38 (L) 11/13/2020 08:31 AM    Triglycerides 175 (H) 05/14/2021 09:18 AM    Triglycerides 120 02/23/2021 08:19 AM    Triglycerides 116 11/13/2020 08:31 AM         Portions of the record may have been created with voice recognition software  Occasional wrong word or "sound a like" substitutions may have occurred due to the inherent limitations of voice recognition software  Read the chart carefully and recognize, using context, where substitutions have occurred

## 2021-08-11 ENCOUNTER — HOSPITAL ENCOUNTER (OUTPATIENT)
Dept: ULTRASOUND IMAGING | Facility: HOSPITAL | Age: 66
Discharge: HOME | End: 2021-08-11
Payer: COMMERCIAL

## 2021-08-11 DIAGNOSIS — E04.1 THYROID NODULE: ICD-10-CM

## 2021-08-11 PROCEDURE — 76536 US EXAM OF HEAD AND NECK: CPT

## 2021-08-18 ENCOUNTER — EVALUATION (OUTPATIENT)
Dept: PHYSICAL THERAPY | Facility: CLINIC | Age: 66
End: 2021-08-18
Payer: COMMERCIAL

## 2021-08-18 DIAGNOSIS — R42 DIZZINESS: Primary | ICD-10-CM

## 2021-08-18 PROCEDURE — 97163 PT EVAL HIGH COMPLEX 45 MIN: CPT

## 2021-08-18 NOTE — PROGRESS NOTES
PT Evaluation     Today's date: 2021  Patient name: Uma Underwood  :   MRN: [id number]  Referring provider: Riddhi Steen MD  Dx:   Encounter Diagnosis     ICD-10-CM    1  Dizziness  R42        Start Time: 1600  Stop Time: 1700  Total time in clinic (min): 60 minutes    Assessment  Assessment details: Patient presents with balance dysfunction and increased fall risk  During evaluation, testing was unable to reproduce patient's dizziness  Positional testing for dizziness was negative for all canals, no positive central findings, and VOR, gaze-evoked nystagmus, and head shaking nystagmus all negative  Patient reported some mild lightheadedness when standing up quickly, lasting about 5 seconds before going away, but not reproducing her symptoms  Patient may have some vestibular hypofunction or UVL that is resolving since onset of dizziness 2 weeks ago that may be why testing was negative  Patient's performance on M-CTSIB demonstrates dysfunction with vestibular system, and FGA has most difficulty with walking with eyes closed situation and tandem walking  FGA scored 21/30, below the cut-off score demonstrating increased risk for falls  Patient would benefit from skilled physical therapy to improve balance and vestibular function to return to prior level of function and decrease risk of falls  Impairments: impaired balance and lacks appropriate home exercise program    Symptom irritability: lowUnderstanding of Dx/Px/POC: good   Prognosis: good    Goals  ST  Patient will improve eyes closed on foam balance to >10 seconds in 3 weeks to improve balance  2  Patient will score PSFS >15/30 in 3 weeks to return to prior level of function  3  Patient will improve FGA to at least 24/30 to decrease fall risk in 3 weeks  LT  Patient with improve eyes closed on foam balance to at least 3 seconds in 6 weeks to improve balance    2  Patient will score PSFS to >25/30 in 6 weeks to return to prior level of function  3  Patient will score >27/30 on FGA in 6 weeks to decrease fall risk  4  Patient will be independent in HEP in 6 weeks  Plan  Patient would benefit from: PT eval and skilled physical therapy  Planned therapy interventions: balance, neuromuscular re-education, patient education, home exercise program and therapeutic activities  Frequency: 1x week  Duration in weeks: 6  Plan of Care beginning date: 8/18/2021  Plan of Care expiration date: 9/29/2021  Treatment plan discussed with: patient        Subjective Evaluation    History of Present Illness  Mechanism of injury: Patient reports she had dizziness that started all of a sudden, went to the ER on 8/3/21  CT scan negative, EKG negative, chest x-ray negative  Prescribed meclozine, got very drowsy and now not taking it  She states she does not feel dizzy every day, but when she does get dizzy it is usually when getting up from chair and getting out of bed  No dizziness when laying down  Dizziness lasts for several minutes and then it is gone, feels like the room spins  She states that she cant tell if she feel lightheaded  States that she feels like she wants to lay down when she gets dizzy and go to sleep  1 fall when leaning forward to pick something from the ground (the day she went to ER), no injury  She states the dizziness may be getting better since it originally started, but she is not sure  She states she had some nausea last night when she got dizzy, but usually does not have n&v  She is currently getting treatment at [address] [place] [address] for plantar fascitis    Pain  No pain reported    Social Support  Steps to enter house: yes  4  Stairs in house: yes   24  Lives in: multiple-level home  Lives with: spouse, adult children and young children    Employment status: not working  Patient Goals  Patient goal: not be dizzy        Objective     Concurrent Complaints  Positive for nausea/motion sickness (1x wit dizziness) and hearing loss (wears hearing aids)  Negative for headaches, tinnitus, visual change, memory loss, aural fullness, poor concentration and peripheral neuropathy  Neuro Exam:     Dizziness  Positive for disequilibrium, vertigo, light-headedness and floating or swimming  Negative for oscillopsia, motion sickness, rocking or swaying and diplopia  Exacerbating factors  Positive for bending over and supine to/from sitting  Negative for rolling in bed, looking up, walking, turning head, optokinetic movement and walking in busy environment  Symptoms   Duration: 5 minutes  Frequency: 3-5 times a day  Intensity at best: 0/10  Intensity at worst: 10/10  Average intensity: 5/10    Headaches   Patient reports headaches: No      Cervical exam   Modified VBI   Left: asymptomatic  Right: asymptomatic    Oculomotor exam   Oculomotor ROM: WNL  Resting nystagmus: not present   Gaze holding nystagmus: not present left  and not present right  Smooth pursuits: within normal limits and slight occasional saccades over midline  Vertical saccades: normal  Horizontal saccades: normal  Convergence: normal  Head thrust: left normal and right normal    Positional testing   [place]   Left posterior canal: WNL  Right posterior canal: WNL  Roll test   Left horizontal canal: WNL  Right horizontal canal: WNL  Positional testing comment: Head shaking nystagmus test: negative      Balance assessments   MCTSIB   Eyes open level surface: 30  Eyes open foam surface: 30  Eyes closed level surface: 30  Eyes closed foam surface: 3    BP: 156/94      Patient-Specific Functional Scale   Task is scored 0 (unable to perform activity) to 10 (ability to perform activity independently)  Activity IE    1  driving 3/23    2  Bending down to put my shoes on without getting 5/10    3     Getting back to my normal exercise program without dizziness 3/10         Precautions: HTN      Manuals                                                                 Neuro Re-Ed Tandem stance             Foam beams             Foam EC             Foam HT/HN             Backwards walking                                       Ther Ex                                                                                                                     Ther Activity                                       Gait Training                                       Modalities

## 2021-08-19 ENCOUNTER — OFFICE VISIT (OUTPATIENT)
Dept: PHYSICAL THERAPY | Facility: CLINIC | Age: 66
End: 2021-08-19
Payer: COMMERCIAL

## 2021-08-19 DIAGNOSIS — M72.2 PLANTAR FASCIITIS, LEFT: Primary | ICD-10-CM

## 2021-08-19 PROCEDURE — 97140 MANUAL THERAPY 1/> REGIONS: CPT

## 2021-08-19 PROCEDURE — 97110 THERAPEUTIC EXERCISES: CPT

## 2021-08-19 NOTE — PROGRESS NOTES
Daily Note     Today's date: 2021  Patient name: Diana Perez  :   MRN: [id number]  Referring provider: Wero Chapin DPM  Dx:   Encounter Diagnosis     ICD-10-CM    1  Plantar fasciitis, left  M72 2                   Subjective: Patient would like to self d/c to hep she feels that she is feeling better and does not have pain every day anymore  Objective: See treatment diary below      Assessment: Tolerated treatment fair  Patient was able to perform listed exercises and stretches without complaints  STM to PF to help decrease fascia restrictions  Reviewed HEP with patient  Patient would benefit from continued PT      Plan: Patient self d/c to HEP            Precautions: none      Manuals  7/7 7/13 8/3 8/10 8/19       ktape I strip plantar fascia DB Split for PF-DL           Ktape compression I strip fat pad DB            Correction strip distal to heel  DL           Navicular, tc, 1st ray mobs   DB 1st ray -DL DB RN       stm PF  DL DB DL DB AF       Neuro Re-Ed             SLS nv            Balance brds nv x20 ea                                                                            Ther Ex             bike nv lv1 6' nv          Ankle 4 way  Green x10 ea            Long sitting PF towel stretch 20''x5  20''x5  20''x5 20"x5       HR/TR nv    2*10 2x10       Toe spreading     2x10 2 x 10                                               Ther Activity                                       Gait Training                                       Modalities

## 2021-08-26 ENCOUNTER — APPOINTMENT (OUTPATIENT)
Dept: PHYSICAL THERAPY | Facility: CLINIC | Age: 66
End: 2021-08-26
Payer: COMMERCIAL

## 2021-09-02 ENCOUNTER — OFFICE VISIT (OUTPATIENT)
Dept: PHYSICAL THERAPY | Facility: CLINIC | Age: 66
End: 2021-09-02
Payer: COMMERCIAL

## 2021-09-02 DIAGNOSIS — R42 DIZZINESS: Primary | ICD-10-CM

## 2021-09-02 PROCEDURE — 97112 NEUROMUSCULAR REEDUCATION: CPT | Performed by: PHYSICAL THERAPIST

## 2021-09-02 NOTE — PROGRESS NOTES
Daily Note     Today's date: 2021  Patient name: Ashton Skiff  :   MRN: [id number]  Referring provider: Gregorio Poole MD  Dx:   Encounter Diagnosis     ICD-10-CM    1  Dizziness  R42                   Subjective: Pt reports her dizziness becoming more intermittent  Pt mainly feels dizziness when she bends forward to tie her shoes  Objective: See treatment diary below      Assessment: Tolerated treatment well  Patient had mild dizziness with oculomotor ex, but recovered well  Issued HEP to be performed when on vacation  Pt had decreased balance with EC on foam      Plan: Continue per plan of care  Continue to progress oculomotor ex            Precautions: HTN      Manuals                                                                 Neuro Re-Ed             Tandem stance on airex EO 30"x2            Ball throw FW & BW walking x2 laps            [place]berg EO FT 1'            [place] EC on airex FA 30"x2            Tandem walking 2 laps            Backwards walking 2 laps            VOR x1 horiz 30"x2            VOR x1 vert 30"x2            Optokinetic training busy grocery store 1'            VOR cancellation Level and foam surf x10 ea            Walking w/ HT vert x2 laps            Walking with HT horiz x2 laps            Walking w/ diag  HT NV            Walking w/ busy grocery store NV            VOR x2  NV                                      Ther Activity                                       Gait Training                                       Modalities

## 2021-09-07 ENCOUNTER — HOSPITAL ENCOUNTER (OUTPATIENT)
Dept: ULTRASOUND IMAGING | Facility: HOSPITAL | Age: 66
Discharge: HOME | End: 2021-09-07
Admitting: PHYSICIAN ASSISTANT
Payer: COMMERCIAL

## 2021-09-07 DIAGNOSIS — E04.1 THYROID NODULE: ICD-10-CM

## 2021-09-07 PROCEDURE — 88173 CYTOPATH EVAL FNA REPORT: CPT | Performed by: PATHOLOGY

## 2021-09-07 PROCEDURE — 10005 FNA BX W/US GDN 1ST LES: CPT

## 2021-09-07 PROCEDURE — 88172 CYTP DX EVAL FNA 1ST EA SITE: CPT | Performed by: PATHOLOGY

## 2021-09-07 RX ORDER — LIDOCAINE HYDROCHLORIDE 10 MG/ML
5 INJECTION, SOLUTION EPIDURAL; INFILTRATION; INTRACAUDAL; PERINEURAL ONCE
Status: COMPLETED | OUTPATIENT
Start: 2021-09-07 | End: 2021-09-07

## 2021-09-07 RX ADMIN — LIDOCAINE HYDROCHLORIDE 2.5 ML: 10 INJECTION, SOLUTION EPIDURAL; INFILTRATION; INTRACAUDAL; PERINEURAL at 13:18

## 2021-09-10 ENCOUNTER — TELEPHONE (OUTPATIENT)
Dept: SURGICAL ONCOLOGY | Facility: CLINIC | Age: 66
End: 2021-09-10

## 2021-09-10 NOTE — TELEPHONE ENCOUNTER
New Patient Encounter    New Patient Intake Form   Patient Details:  Samreen Duke  1955  [id number]    Background Information:  [id number] starts by opening a telephone encounter and gathering the following information   Who is calling to schedule? If not self, relationship to patient? Patient   Referring Provider Jessica Moore   What is the diagnosis? 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 Cancer   Is this diagnosis confirmed? Yes   When was the diagnosis? 9/2021   Is there a confirmed diagnosis from a biopsy/tissue reviewed by pathology? Yes   Were outside slides requested? No   Is patient aware of diagnosis? Yes   Is there a personal history and what kind? No   Is there a family history and what kind? No   Reason for visit? New Diagnosis   Have you had any imaging or labs done? If so: when, where? yes  sl   Are [facility] in [facility]? yes   Are records needed form an outside facility? No   If yes, name, city, state where facility is located  If patient has a prior history of cancer were old records obtained? NA   Was the patient told to bring a disk? No   Does the patient smoke or Vape? No   If yes, how many packs or cartridges per day? Scheduling Information:   Preferred [place]:  Any     Are there any dates/time the patient cannot be seen?     Miscellaneous: pt requested [facility] [place]

## 2021-09-13 NOTE — TELEPHONE ENCOUNTER
Intake received  Benefits review in process  Pt outreach to follow    Per RTE pt has an active aetna med repl plan that has in & out of nertwork benefits  Plan runs on a juany year   Effective 01/01/21  Deduct $203 met  Out of pocket $2500 met $713    Pt has an appt 09/29/21 will revw & f/u w/pt

## 2021-09-20 ENCOUNTER — OFFICE VISIT (OUTPATIENT)
Dept: PHYSICAL THERAPY | Facility: CLINIC | Age: 66
End: 2021-09-20
Payer: COMMERCIAL

## 2021-09-20 DIAGNOSIS — R42 DIZZINESS: Primary | ICD-10-CM

## 2021-09-20 PROCEDURE — 97164 PT RE-EVAL EST PLAN CARE: CPT | Performed by: PHYSICAL THERAPIST

## 2021-09-20 NOTE — PROGRESS NOTES
Daily Note / Discharge Note    Today's date: 2021  Patient name: Rodríguez Keene  : 89  MRN: [id number]  Referring provider: Charlie Pinzon MD  Dx:   Encounter Diagnosis     ICD-10-CM    1  Dizziness  R42                   Subjective: Pt was recently diagnosed with thyroid CA  Pt is wondering if her dizziness is affected by thyroid CA  Pt feels extremely tired  Pt feels like she is not able to get enough rest  Pt sleeps about 10 hours, and she feels like she needs more sleep  Pt has intermittent dizziness  Objective: See treatment diary below      Assessment: Tolerated treatment well  Patient reports she no longer has room spinning with sit to stand transfers and getting out of bed  horiz saccades: 10 reps no dizziness  Vertical saccades: 10 reps no dizziness  Near point convergence: 5 cm  VOR cancellation; 10 reps no dizziness  Head thrust test: negative  Rhomberg EC on foam; 30 seconds  (-) [employer] testing- bilaterally  Pt specific activity: 10/10 driving                                 10/10 bending forward to put on shoes                                  8/10 return to exercise program- pt is now limited due to fatigue caused by thyroid CA    Plan: D/c pt from skilled PT at this time  Pt should continue with HEP   Pt may choose to return after CA tx if still needed          Precautions: HTN      Manuals            Re -eval  25' th                                                  Neuro Re-Ed             Tandem stance on airex EO 30"x2            Ball throw FW & BW walking x2 laps            Rhomberg EO FT 1'            Rhomberg EC on airex FA 30"x2            Tandem walking 2 laps            Backwards walking 2 laps            VOR x1 horiz 30"x2            VOR x1 vert 30"x2            Optokinetic training busy grocery store 1'            VOR cancellation Level and foam surf x10 ea            Walking w/ HT vert x2 laps            Walking with HT horiz x2 laps Walking w/ diag  HT NV            Walking w/ busy grocery store NV            VOR x2  NV                                      Ther Activity                                       Gait Training                                       Modalities

## 2021-09-20 NOTE — LETTER
2021    Dede Butt MD  [facility]  [address]    Patient: Radha Saldivar   YOB: 1955   Date of Visit: 2021     Encounter Diagnosis     Kassie Phan        Dear Dr Jin Jin: Thank you for your recent referral of Radha Saldivar  Please review the attached evaluation summary from Yun's recent visit  Please verify that you agree with the plan of care by signing the attached order  If you have any questions or concerns, please do not hesitate to call  I sincerely appreciate the opportunity to share in the care of one of your patients and hope to have another opportunity to work with you in the near future  Sincerely,    Don Green PT      Referring Provider:      I certify that I have read the below Plan of Care and certify the need for these services furnished under this plan of treatment while under my care  Dede Butt MD  [address] [facility] [address]  Via Fax: [phone]          Daily Note / Discharge Note    Today's date: 2021  Patient name: Radha Saldivar  :   MRN: [id number]  Referring provider: Negro Green MD  Dx:   Encounter Diagnosis     ICD-10-CM    1  Dizziness  R42                   Subjective: Pt was recently diagnosed with thyroid CA  Pt is wondering if her dizziness is affected by thyroid CA  Pt feels extremely tired  Pt feels like she is not able to get enough rest  Pt sleeps about 10 hours, and she feels like she needs more sleep  Pt has intermittent dizziness  Objective: See treatment diary below      Assessment: Tolerated treatment well  Patient reports she no longer has room spinning with sit to stand transfers and getting out of bed     horiz saccades: 10 reps no dizziness  Vertical saccades: 10 reps no dizziness  Near point convergence: 5 cm  VOR cancellation; 10 reps no dizziness  Head thrust test: negative  Rhomberg EC on foam; 30 seconds  (-) [employer] testing- bilaterally  Pt specific activity: 10/10 driving                                 10/10 bending forward to put on shoes                                  8/10 return to exercise program- pt is now limited due to fatigue caused by thyroid CA    Plan: D/c pt from skilled PT at this time  Pt should continue with HEP   Pt may choose to return after CA tx if still needed          Precautions: HTN      Manuals 9/2 9/20           Re -eval  25' th                                                  Neuro Re-Ed             Tandem stance on airex EO 30"x2            Ball throw FW & BW walking x2 laps            Rhomberg EO FT 1'            Rhomberg EC on airex FA 30"x2            Tandem walking 2 laps            Backwards walking 2 laps            VOR x1 horiz 30"x2            VOR x1 vert 30"x2            Optokinetic training busy grocery store 1'            VOR cancellation Level and foam surf x10 ea            Walking w/ HT vert x2 laps            Walking with HT horiz x2 laps            Walking w/ diag  HT NV            Walking w/ busy grocery store NV            VOR x2  NV                                      Ther Activity                                       Gait Training                                       Modalities

## 2021-09-23 LAB
25(OH)D3+25(OH)D2 SERPL-MCNC: 45.4 NG/ML (ref 30–100)
ALBUMIN SERPL-MCNC: 4.5 G/DL (ref 3.8–4.8)
ALBUMIN/GLOB SERPL: 1.8 {RATIO} (ref 1.2–2.2)
ALP SERPL-CCNC: 102 IU/L (ref 44–121)
ALT SERPL-CCNC: 33 IU/L (ref 0–32)
AST SERPL-CCNC: 22 IU/L (ref 0–40)
BILIRUB SERPL-MCNC: 0.5 MG/DL (ref 0–1.2)
BUN SERPL-MCNC: 12 MG/DL (ref 8–27)
BUN/CREAT SERPL: 17 (ref 12–28)
CALCIUM SERPL-MCNC: 10.4 MG/DL (ref 8.7–10.3)
CHLORIDE SERPL-SCNC: 101 MMOL/L (ref 96–106)
CHOLEST SERPL-MCNC: 131 MG/DL (ref 100–199)
CHOLEST/HDLC SERPL: 3 RATIO (ref 0–4.4)
CO2 SERPL-SCNC: 24 MMOL/L (ref 20–29)
CREAT SERPL-MCNC: 0.71 MG/DL (ref 0.57–1)
EST. AVERAGE GLUCOSE BLD GHB EST-MCNC: 154 MG/DL
GLOBULIN SER-MCNC: 2.5 G/DL (ref 1.5–4.5)
GLUCOSE SERPL-MCNC: 150 MG/DL (ref 65–99)
HBA1C MFR BLD: 7 % (ref 4.8–5.6)
HDLC SERPL-MCNC: 44 MG/DL
LDLC SERPL CALC-MCNC: 65 MG/DL (ref 0–99)
POTASSIUM SERPL-SCNC: 4.4 MMOL/L (ref 3.5–5.2)
PROT SERPL-MCNC: 7 G/DL (ref 6–8.5)
PTH-INTACT SERPL-MCNC: 32 PG/ML (ref 15–65)
SL AMB EGFR AFRICAN AMERICAN: 102 ML/MIN/1.73
SL AMB EGFR NON AFRICAN AMERICAN: 88 ML/MIN/1.73
SL AMB VLDL CHOLESTEROL CALC: 22 MG/DL (ref 5–40)
SODIUM SERPL-SCNC: 139 MMOL/L (ref 134–144)
TRIGL SERPL-MCNC: 126 MG/DL (ref 0–149)
TSH SERPL DL<=0.005 MIU/L-ACNC: 1.4 UIU/ML (ref 0.45–4.5)

## 2021-09-28 PROBLEM — E04.1 THYROID NODULE: Status: ACTIVE | Noted: 2021-09-28

## 2021-09-29 ENCOUNTER — CONSULT (OUTPATIENT)
Dept: SURGICAL ONCOLOGY | Facility: CLINIC | Age: 66
End: 2021-09-29
Payer: COMMERCIAL

## 2021-09-29 VITALS
HEIGHT: 66 IN | SYSTOLIC BLOOD PRESSURE: 142 MMHG | WEIGHT: 211 LBS | OXYGEN SATURATION: 98 % | RESPIRATION RATE: 16 BRPM | BODY MASS INDEX: 33.91 KG/M2 | TEMPERATURE: 98 F | HEART RATE: 78 BPM | DIASTOLIC BLOOD PRESSURE: 78 MMHG

## 2021-09-29 DIAGNOSIS — C73 THYROID CANCER (HCC): ICD-10-CM

## 2021-09-29 DIAGNOSIS — E04.1 THYROID NODULE: Primary | ICD-10-CM

## 2021-09-29 PROCEDURE — 99203 OFFICE O/P NEW LOW 30 MIN: CPT | Performed by: SURGERY

## 2021-09-29 NOTE — PROGRESS NOTES
Surgical Oncology Follow Up       [address] [facility] SURGICAL ONCOLOGY [facility] [address]    Dae Friedman  [birth year]  [id number]  [facility] SURGICAL ONCOLOGY [place]  [place] [address]    No chief complaint on file  Assessment/Plan:    No problem-specific Assessment & Plan notes found for this encounter  Diagnoses and all orders for this visit:    Thyroid nodule  -     US thyroid; Future    Thyroid cancer [facility])        Advance Care Planning/Advance Directives:  Discussed disease status, cancer treatment plans and/or cancer treatment goals with the patient  Oncology History    No history exists  History of Present Illness: Patient is a 54-year-old woman with thyroid nodules  This was found when she underwent workup for dizziness and underwent CT scan  Based on that nodules were noted, for which ultrasound recommended  Ultrasound was done, and based on this, a left-sided nodule met criteria for biopsy  This was done and patient is now here to go over results  She denies a history of thyroid issues in her personal and family history,   Regarding malignancy  No difficulty swallowing or breathing  Were not for the scans or ultrasound, she would not have been aware of the nodules  No history of radiation exposure to the head or neck area  Review of Systems   Constitutional: Negative  HENT: Negative  Negative for trouble swallowing and voice change  Eyes: Negative  Respiratory: Negative  Cardiovascular: Negative  Gastrointestinal: Negative  Endocrine: Negative  Genitourinary: Negative  Musculoskeletal: Negative  Skin: Negative  Allergic/Immunologic: Negative  Neurological: Negative  Hematological: Negative  Psychiatric/Behavioral: Negative            Patient Active Problem List   Diagnosis    Essential hypertension    Hyperlipidemia    Type 2 diabetes mellitus without complication, without long-term current use of insulin (HCC)    Hypercalcemia    Thyroid nodule    Thyroid cancer ([place] [address] )     Past Medical History:   Diagnosis Date    Arthritis     COPD (chronic obstructive pulmonary disease) ([facility] )     Diabetes mellitus ([facility] )     GERD (gastroesophageal reflux disease)     Renal disorder     Sleep apnea      Past Surgical History:   Procedure Laterality Date     SECTION      COLONOSCOPY      RENAL CYST EXCISION      TONSILLECTOMY      US GUIDED THYROID BIOPSY  2021     Family History   Problem Relation Age of Onset    COPD Mother     Lung cancer Mother     Lung cancer Father     Hypertension Father     No Known Problems Sister     Hypertension Brother     Colon polyps Brother     Kidney disease Brother     Colon polyps Brother     Stroke Brother     Kidney disease Brother     Hypertension Brother     Hyperlipidemia Brother     Gestational diabetes Sister     Colon cancer Maternal Aunt      Social History     Socioeconomic History    Marital status: /Civil Union     Spouse name: Not on file    Number of children: Not on file    Years of education: Not on file    Highest education level: Not on file   Occupational History    Not on file   Tobacco Use    Smoking status: Former Smoker     Packs/day: 0 50     Types: Cigarettes     Quit date: 10/29/1981     Years since quittin 9    Smokeless tobacco: Never Used   Vaping Use    Vaping Use: Never used   Substance and Sexual Activity    Alcohol use: No    Drug use: No    Sexual activity: Not on file   Other Topics Concern    Not on file   Social History Narrative    Not on file     Social Determinants of Health     Financial Resource Strain:     Difficulty of Paying Living Expenses:    Food Insecurity:     Worried About Running Out of Food in the Last Year:     [address] in the Last Year:    Transportation Needs:     Lack of Transportation (Medical):  Lack of Transportation (Non-Medical):    Physical Activity:     Days of Exercise per Week:     Minutes of Exercise per Session:    Stress:     Feeling of Stress :    Social Connections:     Frequency of Communication with Friends and Family:     Frequency of Social Gatherings with Friends and Family:     Attends Taoist Services:     Active Member of Clubs or Organizations:     Attends Club or Organization Meetings:     Marital Status:    Intimate Partner Violence:     Fear of Current or Ex-Partner:     Emotionally Abused:     Physically Abused:     Sexually Abused:        Current Outpatient Medications:     aspirin 81 mg chewable tablet, Chew 81 mg every other day , Disp: , Rfl:     cholecalciferol (VITAMIN D3) 1,000 units tablet, Take 2,000 Units by mouth daily, Disp: , Rfl:     losartan (COZAAR) 50 mg tablet, TAKE 1 TABLET BY MOUTH EVERY DAY, Disp: 90 tablet, Rfl: 1    metFORMIN (GLUCOPHAGE) 1000 MG tablet, TAKE 1 TABLET BY MOUTH TWICE A DAY WITH MEALS, Disp: 180 tablet, Rfl: 3    pantoprazole (PROTONIX) 40 mg tablet, TAKE 1 TABLET BY MOUTH EVERY DAY, Disp: 90 tablet, Rfl: 5    simvastatin (ZOCOR) 40 mg tablet, Take 40 mg by mouth every evening, Disp: , Rfl:     ascorbic acid (VITAMIN C) 250 mg tablet, Take 125 mg by mouth daily (Patient not taking: Reported on 8/10/2021), Disp: , Rfl:     escitalopram (LEXAPRO) 10 mg tablet, Take 10 mg by mouth daily 5 mg  And weaning off (Patient not taking: Reported on 7/14/2021), Disp: , Rfl:     halobetasol (ULTRAVATE) 0 05 % ointment, Apply daily   (Patient not taking: Reported on 7/14/2021), Disp: 1 Tube, Rfl: 0    meclizine (ANTIVERT) 12 5 MG tablet, Take 1 tablet (12 5 mg total) by mouth 3 (three) times a day as needed for dizziness (Patient not taking: Reported on 8/10/2021), Disp: 30 tablet, Rfl: 0    Multiple Vitamins-Minerals (AIRBORNE GUMMIES PO), Take by mouth (Patient not taking: Reported on 7/14/2021), Disp: , Rfl:     Polyethyl Glycol-Propyl Glycol (Systane) 0 4-0 3 % GEL, Apply to eye (Patient not taking: Reported on 9/29/2021), Disp: , Rfl:   Allergies   Allergen Reactions    Demerol [Meperidine] GI Intolerance     Other reaction(s): N/V    Morphine GI Intolerance     Other reaction(s): N/V    Meloxicam Swelling     Vitals:    09/29/21 0755   BP: 142/78   Pulse: 78   Resp: 16   Temp: 98 °F (36 7 °C)   SpO2: 98%       Physical Exam  Constitutional:       Appearance: Normal appearance  HENT:      Head: Normocephalic and atraumatic  Right Ear: External ear normal       Left Ear: External ear normal       Nose: Nose normal    Eyes:      General: No scleral icterus  Extraocular Movements: Extraocular movements intact  Conjunctiva/sclera: Conjunctivae normal       Pupils: Pupils are equal, round, and reactive to light  Cardiovascular:      Rate and Rhythm: Normal rate and regular rhythm  Heart sounds: Normal heart sounds  Pulmonary:      Effort: Pulmonary effort is normal       Breath sounds: Normal breath sounds  Abdominal:      General: Abdomen is flat  There is no distension  Palpations: Abdomen is soft  There is no mass  Tenderness: There is no abdominal tenderness  Hernia: No hernia is present  Musculoskeletal:         General: Normal range of motion  Cervical back: Normal range of motion and neck supple  No rigidity or tenderness  Lymphadenopathy:      Cervical: No cervical adenopathy  Skin:     General: Skin is warm and dry  Neurological:      General: No focal deficit present  Mental Status: She is alert and oriented to person, place, and time     Psychiatric:         Mood and Affect: Mood normal          Behavior: Behavior normal          Pathology:  Case Report   Non-gynecologic Cytology                          Case: [id number]                                   Authorizing Provider: Elias Jolley PA-C        Collected:           09/07/2021 1318               Ordering Location:     [facility]     Received:            09/07/2021 1649                                      [facility]                                                             Pathologist:           Alea Villa DO                                                      Specimens:   A) - Thyroid, Left, Mid Gland                                                                        B) - Thyroid, Left, Mid Gland                                                              Final Diagnosis   A  and B  Thyroid, Left Mid, FNAB (ThinPrep and Smear Preparations):  - Suspicious for follicular neoplasm ([place] Category IV)  See Note  - Cellular smears consisting of enlarged, overlapping, crowded follicular cells with occasional grooves, few nucleoli, and rare inclusions   - Scant background colloid present  Electronically signed by Alea Villa DO on 9/9/2021 at  1:42 PM   Note    As reported in the [address] [place] [address] for Reporting Thyroid Cytopathology* this diagnostic category has demonstrated anywhere from 25-40% risk of malignancy being found in subsequent resections and/or FNA  This risk of malignancy is expected to change due to the usage of the surgical pathology diagnosis of 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)    The anticipated risk of malignancy secondary to NIFTP is 10-40%  The histologic follow-up of cases diagnosed as follicular neoplasm/suspicious for follicular neoplasm includes follicular adenoma, follicular carcinoma, and follicular variant of papillary thyroid carcinoma including the recently described indolent counterpart, NIFTP  The manual reports that the usual management following this diagnosis is genetic testing or lobectomy   Ultimately, clinical/imaging correlation for this patient is needed in arriving at the actual management plan      *The [place] System for Reporting Thyroid Cytopathology, Leon Fragoso ), 2018 (2nd ed )      The treating physician has requested a sample from the above thyroid nodule be sent for analysis by [facility] Gene Expression  ([facility]), performed by Yudelka [facility]  A separate report with results of [facility] will follow directly from [facility], typically taking 14 days  Intraoperative Consultation       Thyroid, Left Mid Gland FNA On-Site Evaluation:     Adequate      Dr Pineda Monday  Interpretation performed at [address]   Gross Description       A  20 mL of pink, clear fluid, received in [facility]         B  6 slides received (3 Alcohol-Fixed, 3 Diff-Quik)      [facility] BENIGN    Labs:  Lab Results   Component Value Date    TSH 1 400 09/22/2021         Imaging    THYROID ULTRASOUND     INDICATION:    E04 1: Nontoxic single thyroid nodule      COMPARISON:  None     TECHNIQUE:   Ultrasound of the thyroid was performed with a high frequency linear transducer in transverse and sagittal planes including volumetric imaging sweeps as well as traditional still imaging technique      FINDINGS:  Heterogeneous echotexture  Increased vascularity      Right lobe:  5 3 x 2 x 2 1 cm  Left lobe:  5 5 x 2 x 2 1 cm  Isthmus:  0 3 cm      Nodule #1  Image 3  RIGHT upper pole nodule measuring 2 x 1 3 x 1 7 cm  COMPOSITION:  2 points, solid or almost completely solid   ECHOGENICITY:  1 point, hyperechoic or isoechoic  SHAPE:  0 points, wider-than-tall  MARGIN: 0 points, smooth  ECHOGENIC FOCI:  0 points, none or large comet-tail artifacts  TI-RADS Classification: TR 3 (3 points), FNA if >2 5 cm  Follow if >1 5 cm      Nodule #2  Image 7  RIGHT lower pole nodule measuring 1 x 1 x 1 cm  COMPOSITION:  2 points, solid or almost completely solid   ECHOGENICITY:  1 point, hyperechoic or isoechoic  SHAPE:  0 points, wider-than-tall      MARGIN: 0 points, smooth  ECHOGENIC FOCI:  0 points, none or large comet-tail artifacts  TI-RADS Classification: TR 3 (3 points), FNA if >2 5 cm  Follow if >1 5 cm      Nodule #3  Image 27  LEFT midgland nodule measuring 3 4 x 2 1 x 2 cm  COMPOSITION:  2 points, solid or almost completely solid   ECHOGENICITY:  1 point, hyperechoic or isoechoic  SHAPE:  0 points, wider-than-tall  MARGIN: 0 points, smooth  ECHOGENIC FOCI:  0 points, none or large comet-tail artifacts  TI-RADS Classification: TR 3 (3 points), FNA if >2 5 cm  Follow if >1 5 cm            IMPRESSION:     The following meet current ACR criteria for recommending ultrasound guided biopsy: Left mid thyroid nodule measuring 3 4 x 2 1 x 2 cm      The study was marked in EPIC for significant notification      Reference: ACR Thyroid Imaging, Reporting and Data System (TI-RADS): White Paper of the [employer]  J AM Teresa Radiol 5980;40:834-258  (additional recommendations based on American Thyroid Association 2015 guidelines )        Workstation performed: [id number]          US guided thyroid biopsy with [employer]    Result Date: 9/7/2021  Narrative: ULTRASOUND-GUIDED THYROID BIOPSY HISTORY: 76year-old female with a history of thyroid nodules  On prior thyroid ultrasound from 8/11/2021, there was a left mid gland thyroid nodule measuring 3 4 x 2 1 x 2 0 cm (TI-RADS 3) that met criteria for FNA  The patient presents with prescription undergo FNA of the left mid gland thyroid nodule with [employer] testing  COMPARISON: Thyroid ultrasound 8/11/2021 FINDINGS: Prior ultrasound images were reviewed  On today's limited thyroid ultrasound, the left mid gland thyroid nodule measured 3 2 x 2 1 x 1 9 cm  The procedure was explained to the patient, including risks of hemorrhage, infection and local injury  Informed consent was freely obtained  The patient verbalized expressed understanding of the above risks and wished to proceed with the procedure   Final standard "time-out" procedure performed  PROCEDURE: The neck was prepped and draped in normal sterile fashion  Under real-time ultrasound guidance and local anesthesia 5 passes with a 25 gauge needle were made through the left mid gland thyroid nodule  Cytopathology was present and deemed the specimens adequate for evaluation  3 specimens were sent to cytology and 2 specimens were held for [employer] testing  The patient tolerated the procedure well  Postprocedure instructions were provided for the patient  I asked the patient to call us with any questions, concerns, or acute problems  The patient expressed understanding of the above  Impression: Status post successful ultrasound-guided biopsy with [employer] testing of the left mid gland thyroid nodule  I reviewed the above findings and procedure with Dr Grant January  PERFORMED, DICTATED AND SIGNED BY: Everton Ozuna PA-C Workstation performed: [id number]     I reviewed the above laboratory and imaging data  Discussion/Summary:  Patient is a 65-year-old woman with thyroid nodules, benign on biopsy but which merit follow-up based on size  We will therefore plan on 6 month follow-up ultrasound per patient's request   Pathology report as well as ultrasound reports discussed with her  All questions answered  She is amenable to this plan

## 2021-09-29 NOTE — LETTER
September 29, 2021     Jules Sky MD  [facility]  [address]    Patient: Umberto Sanchez   YOB: 1955   Date of Visit: 9/29/2021       Dear Dr Munguia Areas: Thank you for referring Umberto Sanchez to me for evaluation  Below are my notes for this consultation  If you have questions, please do not hesitate to call me  I look forward to following your patient along with you  Sincerely,        Dorian Lucas MD        CC: DO Dorian Najera MD  9/29/2021  8:56 AM  Sign when Signing Visit               Surgical Oncology Follow Up       [address] [id number]    Umberto Sanchez  [birth year]  [id number]  [facility]  [facility] [address] [address]    No chief complaint on file  Assessment/Plan:    No problem-specific Assessment & Plan notes found for this encounter  Diagnoses and all orders for this visit:    Thyroid nodule  -     US thyroid; Future    Thyroid cancer [facility])        Advance Care Planning/Advance Directives:  Discussed disease status, cancer treatment plans and/or cancer treatment goals with the patient  Oncology History    No history exists  History of Present Illness: Patient is a 58-year-old woman with thyroid nodules  This was found when she underwent workup for dizziness and underwent CT scan  Based on that nodules were noted, for which ultrasound recommended  Ultrasound was done, and based on this, a left-sided nodule met criteria for biopsy  This was done and patient is now here to go over results  She denies a history of thyroid issues in her personal and family history,   Regarding malignancy  No difficulty swallowing or breathing  Were not for the scans or ultrasound, she would not have been aware of the nodules    No history of radiation exposure to the head or neck area  Review of Systems   Constitutional: Negative  HENT: Negative  Negative for trouble swallowing and voice change  Eyes: Negative  Respiratory: Negative  Cardiovascular: Negative  Gastrointestinal: Negative  Endocrine: Negative  Genitourinary: Negative  Musculoskeletal: Negative  Skin: Negative  Allergic/Immunologic: Negative  Neurological: Negative  Hematological: Negative  Psychiatric/Behavioral: Negative            Patient Active Problem List   Diagnosis    Essential hypertension    Hyperlipidemia    Type 2 diabetes mellitus without complication, without long-term current use of insulin (HCC)    Hypercalcemia    Thyroid nodule    Thyroid cancer ([place] [address] )     Past Medical History:   Diagnosis Date    Arthritis     COPD (chronic obstructive pulmonary disease) ([place] [address] )     Diabetes mellitus ([place] [address] )     GERD (gastroesophageal reflux disease)     Renal disorder     Sleep apnea      Past Surgical History:   Procedure Laterality Date     SECTION      COLONOSCOPY      RENAL CYST EXCISION      TONSILLECTOMY      US GUIDED THYROID BIOPSY  2021     Family History   Problem Relation Age of Onset    COPD Mother     Lung cancer Mother     Lung cancer Father     Hypertension Father     No Known Problems Sister     Hypertension Brother     Colon polyps Brother     Kidney disease Brother     Colon polyps Brother     Stroke Brother     Kidney disease Brother     Hypertension Brother     Hyperlipidemia Brother     Gestational diabetes Sister     Colon cancer Maternal Aunt      Social History     Socioeconomic History    Marital status: /Civil Union     Spouse name: Not on file    Number of children: Not on file    Years of education: Not on file    Highest education level: Not on file   Occupational History    Not on file   Tobacco Use    Smoking status: Former Smoker     Packs/day: 0 50     Types: Cigarettes     Quit date: 10/29/1981     Years since quittin 9    Smokeless tobacco: Never Used   Vaping Use    Vaping Use: Never used   Substance and Sexual Activity    Alcohol use: No    Drug use: No    Sexual activity: Not on file   Other Topics Concern    Not on file   Social History Narrative    Not on file     Social Determinants of Health     Financial Resource Strain:     Difficulty of Paying Living Expenses:    Food Insecurity:     Worried About Running Out of Food in the Last Year:     Ran Out of Food in the Last Year:    Transportation Needs:     Lack of Transportation (Medical):      Lack of Transportation (Non-Medical):    Physical Activity:     Days of Exercise per Week:     Minutes of Exercise per Session:    Stress:     Feeling of Stress :    Social Connections:     Frequency of Communication with Friends and Family:     Frequency of Social Gatherings with Friends and Family:     Attends Sabianism Services:     Active Member of Clubs or Organizations:     Attends Club or Organization Meetings:     Marital Status:    Intimate Partner Violence:     Fear of Current or Ex-Partner:     Emotionally Abused:     Physically Abused:     Sexually Abused:        Current Outpatient Medications:     aspirin 81 mg chewable tablet, Chew 81 mg every other day , Disp: , Rfl:     cholecalciferol (VITAMIN D3) 1,000 units tablet, Take 2,000 Units by mouth daily, Disp: , Rfl:     losartan (COZAAR) 50 mg tablet, TAKE 1 TABLET BY MOUTH EVERY DAY, Disp: 90 tablet, Rfl: 1    metFORMIN (GLUCOPHAGE) 1000 MG tablet, TAKE 1 TABLET BY MOUTH TWICE A DAY WITH MEALS, Disp: 180 tablet, Rfl: 3    pantoprazole (PROTONIX) 40 mg tablet, TAKE 1 TABLET BY MOUTH EVERY DAY, Disp: 90 tablet, Rfl: 5    simvastatin (ZOCOR) 40 mg tablet, Take 40 mg by mouth every evening, Disp: , Rfl:     ascorbic acid (VITAMIN C) 250 mg tablet, Take 125 mg by mouth daily (Patient not taking: Reported on 8/10/2021), Disp: , Rfl:     escitalopram (LEXAPRO) 10 mg tablet, Take 10 mg by mouth daily 5 mg  And weaning off (Patient not taking: Reported on 7/14/2021), Disp: , Rfl:     halobetasol (ULTRAVATE) 0 05 % ointment, Apply daily  (Patient not taking: Reported on 7/14/2021), Disp: 1 Tube, Rfl: 0    meclizine (ANTIVERT) 12 5 MG tablet, Take 1 tablet (12 5 mg total) by mouth 3 (three) times a day as needed for dizziness (Patient not taking: Reported on 8/10/2021), Disp: 30 tablet, Rfl: 0    Multiple Vitamins-Minerals (AIRBORNE GUMMIES PO), Take by mouth (Patient not taking: Reported on 7/14/2021), Disp: , Rfl:     Polyethyl Glycol-Propyl Glycol (Systane) 0 4-0 3 % GEL, Apply to eye (Patient not taking: Reported on 9/29/2021), Disp: , Rfl:   Allergies   Allergen Reactions    Demerol [Meperidine] GI Intolerance     Other reaction(s): N/V    Morphine GI Intolerance     Other reaction(s): N/V    Meloxicam Swelling     Vitals:    09/29/21 0755   BP: 142/78   Pulse: 78   Resp: 16   Temp: 98 °F (36 7 °C)   SpO2: 98%       Physical Exam  Constitutional:       Appearance: Normal appearance  HENT:      Head: Normocephalic and atraumatic  Right Ear: External ear normal       Left Ear: External ear normal       Nose: Nose normal    Eyes:      General: No scleral icterus  Extraocular Movements: Extraocular movements intact  Conjunctiva/sclera: Conjunctivae normal       Pupils: Pupils are equal, round, and reactive to light  Cardiovascular:      Rate and Rhythm: Normal rate and regular rhythm  Heart sounds: Normal heart sounds  Pulmonary:      Effort: Pulmonary effort is normal       Breath sounds: Normal breath sounds  Abdominal:      General: Abdomen is flat  There is no distension  Palpations: Abdomen is soft  There is no mass  Tenderness: There is no abdominal tenderness  Hernia: No hernia is present  Musculoskeletal:         General: Normal range of motion  Cervical back: Normal range of motion and neck supple   No rigidity or tenderness  Lymphadenopathy:      Cervical: No cervical adenopathy  Skin:     General: Skin is warm and dry  Neurological:      General: No focal deficit present  Mental Status: She is alert and oriented to person, place, and time  Psychiatric:         Mood and Affect: Mood normal          Behavior: Behavior normal          Pathology:  Case Report   Non-gynecologic Cytology                          Case: [id number]                                   Authorizing Provider: Moi Sullivan PA-C        Collected:           09/07/2021 1318               Ordering Location:     [facility]     Received:            09/07/2021 1649                                      [facility]                                                             Pathologist:           Wenceslao Meyer DO                                                      Specimens:   A) - Thyroid, Left, Mid Gland                                                                        B) - Thyroid, Left, Mid Gland                                                              Final Diagnosis   A  and B  Thyroid, Left Mid, FNAB (ThinPrep and Smear Preparations):  - Suspicious for follicular neoplasm ([place] Category IV)  See Note  - Cellular smears consisting of enlarged, overlapping, crowded follicular cells with occasional grooves, few nucleoli, and rare inclusions   - Scant background colloid present  Electronically signed by Wenceslao Meyer DO on 9/9/2021 at  1:42 PM   Note    As reported in the [address] [place] [address] for Reporting Thyroid Cytopathology* this diagnostic category has demonstrated anywhere from 25-40% risk of malignancy being found in subsequent resections and/or FNA  This risk of malignancy is expected to change due to the usage of the surgical pathology diagnosis of 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)    The anticipated risk of malignancy secondary to NIFTP is 10-40%  The histologic follow-up of cases diagnosed as follicular neoplasm/suspicious for follicular neoplasm includes follicular adenoma, follicular carcinoma, and follicular variant of papillary thyroid carcinoma including the recently described indolent counterpart, NIFTP  The manual reports that the usual management following this diagnosis is genetic testing or lobectomy  Ultimately, clinical/imaging correlation for this patient is needed in arriving at the actual management plan      *The [place] for Reporting Thyroid Cytopathology, [facility] ), 2018 (2nd ed )      The treating physician has requested a sample from the above thyroid nodule be sent for analysis by [facility]  (irma GEC), performed by [address])  A separate [facility] with [facility] will follow directly from [facility], typically taking 14 days  Intraoperative Consultation       Thyroid, Left Mid Gland FNA On-Site Evaluation:     Adequate      Dr Villatoro Reasons  Interpretation performed at [address]   Gross Description       A  20 mL of pink, clear fluid, received in CytoLyt         B  6 slides received (3 Alcohol-Fixed, 3 Diff-Quik)      AFIRMA BENIGN    Labs:  Lab Results   Component Value Date    TSH 1 400 09/22/2021         Imaging    THYROID ULTRASOUND     INDICATION:    E04 1: Nontoxic single thyroid nodule      COMPARISON:  None     TECHNIQUE:   Ultrasound of the thyroid was performed with a high frequency linear transducer in transverse and sagittal planes including volumetric imaging sweeps as well as traditional still imaging technique      FINDINGS:  Heterogeneous echotexture  Increased vascularity      Right lobe:  5 3 x 2 x 2 1 cm  Left lobe:  5 5 x 2 x 2 1 cm  Isthmus:  0 3 cm      Nodule #1  Image 3  RIGHT upper pole nodule measuring 2 x 1 3 x 1 7 cm    COMPOSITION: 2 points, solid or almost completely solid   ECHOGENICITY:  1 point, hyperechoic or isoechoic  SHAPE:  0 points, wider-than-tall  MARGIN: 0 points, smooth  ECHOGENIC FOCI:  0 points, none or large comet-tail artifacts  TI-RADS Classification: TR 3 (3 points), FNA if >2 5 cm  Follow if >1 5 cm      Nodule #2  Image 7  RIGHT lower pole nodule measuring 1 x 1 x 1 cm  COMPOSITION:  2 points, solid or almost completely solid   ECHOGENICITY:  1 point, hyperechoic or isoechoic  SHAPE:  0 points, wider-than-tall  MARGIN: 0 points, smooth  ECHOGENIC FOCI:  0 points, none or large comet-tail artifacts  TI-RADS Classification: TR 3 (3 points), FNA if >2 5 cm  Follow if >1 5 cm      Nodule #3  Image 27  LEFT midgland nodule measuring 3 4 x 2 1 x 2 cm  COMPOSITION:  2 points, solid or almost completely solid   ECHOGENICITY:  1 point, hyperechoic or isoechoic  SHAPE:  0 points, wider-than-tall  MARGIN: 0 points, smooth  ECHOGENIC FOCI:  0 points, none or large comet-tail artifacts  TI-RADS Classification: TR 3 (3 points), FNA if >2 5 cm  Follow if >1 5 cm            IMPRESSION:     The following meet current ACR criteria for recommending ultrasound guided biopsy: Left mid thyroid nodule measuring 3 4 x 2 1 x 2 cm      The study was marked in EPIC for significant notification      Reference: ACR Thyroid Imaging, Reporting and Data System (TI-RADS): White Paper of the [employer]  J AM Teresa Radiol 2028;85:[id number]  (additional recommendations based on American Thyroid Association 2015 guidelines )        Workstation performed: [id number]          US guided thyroid biopsy with [facility]    Result Date: 9/7/2021  Narrative: ULTRASOUND-GUIDED THYROID BIOPSY HISTORY: 76year-old female with a history of thyroid nodules  On prior thyroid ultrasound from 8/11/2021, there was a left mid gland thyroid nodule measuring 3 4 x 2 1 x 2 0 cm (TI-RADS 3) that met criteria for FNA    The patient presents with prescription undergo FNA of the left mid gland thyroid nodule with [employer] testing  COMPARISON: Thyroid ultrasound 8/11/2021 FINDINGS: Prior ultrasound images were reviewed  On today's limited thyroid ultrasound, the left mid gland thyroid nodule measured 3 2 x 2 1 x 1 9 cm  The procedure was explained to the patient, including risks of hemorrhage, infection and local injury  Informed consent was freely obtained  The patient verbalized expressed understanding of the above risks and wished to proceed with the procedure  Final standard "time-out" procedure performed  PROCEDURE: The neck was prepped and draped in normal sterile fashion  Under real-time ultrasound guidance and local anesthesia 5 passes with a 25 gauge needle were made through the left mid gland thyroid nodule  Cytopathology was present and deemed the specimens adequate for evaluation  3 specimens were sent to cytology and 2 specimens were held for [employer] testing  The patient tolerated the procedure well  Postprocedure instructions were provided for the patient  I asked the patient to call us with any questions, concerns, or acute problems  The patient expressed understanding of the above  Impression: Status post successful ultrasound-guided biopsy with [employer] testing of the left mid gland thyroid nodule  I reviewed the above findings and procedure with Dr Mauricio Beckwith  PERFORMED, DICTATED AND SIGNED BY: Ev Jones PA-C Workstation performed: [id number]     I reviewed the above laboratory and imaging data  Discussion/Summary:  Patient is a 41-year-old woman with thyroid nodules, benign on biopsy but which merit follow-up based on size  We will therefore plan on 6 month follow-up ultrasound per patient's request   Pathology report as well as ultrasound reports discussed with her  All questions answered  She is amenable to this plan

## 2021-10-01 ENCOUNTER — HOSPITAL ENCOUNTER (OUTPATIENT)
Dept: ULTRASOUND IMAGING | Facility: HOSPITAL | Age: 66
Discharge: HOME | End: 2021-10-01
Payer: COMMERCIAL

## 2021-10-01 DIAGNOSIS — N18.1 CHRONIC KIDNEY DISEASE, STAGE 1: ICD-10-CM

## 2021-10-01 PROCEDURE — 76770 US EXAM ABDO BACK WALL COMP: CPT

## 2021-11-16 ENCOUNTER — OFFICE VISIT (OUTPATIENT)
Dept: PULMONOLOGY | Facility: HOSPITAL | Age: 66
End: 2021-11-16
Payer: COMMERCIAL

## 2021-11-16 VITALS
SYSTOLIC BLOOD PRESSURE: 140 MMHG | OXYGEN SATURATION: 96 % | HEIGHT: 66 IN | BODY MASS INDEX: 33.75 KG/M2 | RESPIRATION RATE: 16 BRPM | WEIGHT: 210 LBS | TEMPERATURE: 98.9 F | HEART RATE: 78 BPM | DIASTOLIC BLOOD PRESSURE: 70 MMHG

## 2021-11-16 DIAGNOSIS — Z99.89 OSA ON CPAP: Primary | ICD-10-CM

## 2021-11-16 DIAGNOSIS — E66.01 MORBID OBESITY (HCC): ICD-10-CM

## 2021-11-16 DIAGNOSIS — G47.33 OSA ON CPAP: Primary | ICD-10-CM

## 2021-11-16 PROBLEM — C73 THYROID CANCER (HCC): Status: RESOLVED | Noted: 2021-09-29 | Resolved: 2021-11-16

## 2021-11-16 PROCEDURE — 99204 OFFICE O/P NEW MOD 45 MIN: CPT | Performed by: INTERNAL MEDICINE

## 2021-11-16 PROCEDURE — G0008 ADMIN INFLUENZA VIRUS VAC: HCPCS

## 2021-11-16 PROCEDURE — 90662 IIV NO PRSV INCREASED AG IM: CPT

## 2021-11-16 RX ORDER — CALCIUM CARBONATE/VITAMIN D3 600 MG-20
TABLET ORAL
COMMUNITY
Start: 2021-10-16

## 2021-11-18 ENCOUNTER — DOCUMENTATION (OUTPATIENT)
Dept: PULMONOLOGY | Facility: CLINIC | Age: 66
End: 2021-11-18

## 2022-02-10 ENCOUNTER — TELEPHONE (OUTPATIENT)
Dept: ENDOCRINOLOGY | Facility: HOSPITAL | Age: 67
End: 2022-02-10

## 2022-02-10 NOTE — TELEPHONE ENCOUNTER
Called patient to see if she would like to reschedule  She at this time she would like to f/u with her PCP and will contact us if she would need to see us for any "issues  "

## 2022-02-25 ENCOUNTER — TELEPHONE (OUTPATIENT)
Dept: SURGICAL ONCOLOGY | Facility: CLINIC | Age: 67
End: 2022-02-25

## 2022-02-28 ENCOUNTER — TELEPHONE (OUTPATIENT)
Dept: PULMONOLOGY | Facility: CLINIC | Age: 67
End: 2022-02-28

## 2022-02-28 ENCOUNTER — TELEPHONE (OUTPATIENT)
Dept: HEMATOLOGY ONCOLOGY | Facility: CLINIC | Age: 67
End: 2022-02-28

## 2022-02-28 NOTE — TELEPHONE ENCOUNTER
Pt called stating that machine has stopped working for her for a week now  She states is makes a loud noise when she inhales, she is not getting any air through the machine  She has been in contact [employer] Liaison and [employer] and they are not helping her  She is just being told to contact her lung specialist  She does have her machine registered on the [facility]  She states she can't sleep without the machine and is having a lot of trouble  Pt requesting a call back with a possible resolution and what she can do in the meantime

## 2022-03-02 DIAGNOSIS — Z99.89 OSA ON CPAP: Primary | ICD-10-CM

## 2022-03-02 DIAGNOSIS — G47.33 OSA ON CPAP: Primary | ICD-10-CM

## 2022-03-02 NOTE — TELEPHONE ENCOUNTER
I called the patient's [address] in [place] and spoke to [facility], she stated they are not able to look over the machine or do any repairs to it because it is on recall  Therefore, patient was correct, they are not able to do anything to help her  I also asked if they had any Resmeds in stock and they stated no they did not and they do not know when they will be getting some in  So even if we sent them a new RX for a different machine, they still would not be able to accommodate the patient  I also called Felice Justice, she did state that her insurance will accept a new RX for a new machine as long as it is not the [facility] Lionel  I told her we would be prescribing a Resmed instead, patient was in agreement to try another DME company and I advised her that I would have Kassy, our DME representative process a new order to see if a new machine would get covered  Once the process is done, I would set her up with Kassy to come into our [place] office to get fitted and set up with a new machine  Patient was in agreement and will wait for a call back pending the outcome with her insurance

## 2022-03-02 NOTE — TELEPHONE ENCOUNTER
Called and updated patient that the message was sent over to the sleep coordinator to look into further  She stated that she called her insurance and they advised that we can just order a new cpap but It can not be [place]  I advised her I am going to send all the information over to the coordinator and she will get back to her

## 2022-03-03 ENCOUNTER — TELEPHONE (OUTPATIENT)
Dept: SLEEP CENTER | Facility: CLINIC | Age: 67
End: 2022-03-03

## 2022-03-31 ENCOUNTER — HOSPITAL ENCOUNTER (OUTPATIENT)
Dept: ULTRASOUND IMAGING | Facility: HOSPITAL | Age: 67
Discharge: HOME | End: 2022-03-31
Attending: SURGERY
Payer: COMMERCIAL

## 2022-03-31 DIAGNOSIS — E04.1 THYROID NODULE: ICD-10-CM

## 2022-03-31 PROCEDURE — 76536 US EXAM OF HEAD AND NECK: CPT

## 2022-04-13 ENCOUNTER — TELEPHONE (OUTPATIENT)
Dept: HEMATOLOGY ONCOLOGY | Facility: CLINIC | Age: 67
End: 2022-04-13

## 2022-04-13 NOTE — TELEPHONE ENCOUNTER
CALL RETURN FORM   Reason for patient call? Patient calling in requesting to go over results of thyroid ultrasound  From 3/31/22  Patient's primary oncologist? Ramon Adame    Name of person the patient was calling for? Nurse     Any additional information to add, if applicable? Best call back number [phone]   Informed patient that the message will be forwarded to the team and someone will get back to them as soon as possible    Did you relay this information to the patient?  Yes

## 2022-04-13 NOTE — TELEPHONE ENCOUNTER
Spoke to patient and answered all questions regarding latest US  Patient will see Dr Lakshmi Hernández this Friday for scheduled appt

## 2022-04-14 ENCOUNTER — TELEPHONE (OUTPATIENT)
Dept: HEMATOLOGY ONCOLOGY | Facility: CLINIC | Age: 67
End: 2022-04-14

## 2022-04-14 NOTE — TELEPHONE ENCOUNTER
Appointment Cancellation Or Reschedule     Person calling in patient   Provider Dr Klaus Houston   Office Visit Date and Time 4/15 @ 1115am   Office Visit Location [place]   Did patient want to reschedule their office appointment? If so, when was it scheduled to? no   Is this patient calling to reschedule an infusion appointment? no   When is their next infusion appointment? n/a   Is this patient a Chemo patient? no   Reason for Cancellation or Reschedule Patient did not specify and did not want to reschedule     If the patient is a treatment patient, please route this to the office nurse  If the patient is not on treatment, please route to the office MA

## 2022-09-21 ENCOUNTER — TELEPHONE (OUTPATIENT)
Dept: GASTROENTEROLOGY | Facility: CLINIC | Age: 67
End: 2022-09-21

## 2022-09-21 NOTE — TELEPHONE ENCOUNTER
Message left for pt that another ov opened for tomorrow--requested she call back asap to confirm apt

## 2022-09-21 NOTE — TELEPHONE ENCOUNTER
Spoke with pt, she has been experiencing bloating, nausea, decreased appetite and bowel incontinence for approximately 2 months  She has the incontinence a few times per week and all times of the day  Denies vomiting or fever  She is also seeing a nephrologist for enlarged kidneys  She takes Protonix 4 mg daily and Tums prn  She is not taking any new medications, no antibiotics in the last few months and no recent travel  Placed pt on the wait list and nurse manager notified  Do you have any recommendations for her while she waits for an apt? Pharmacy confirmed    [phone]

## 2022-09-21 NOTE — TELEPHONE ENCOUNTER
Patients GI provider:  Dr Matt Boyer    Number to return call: [phone]    Reason for call: Pt calling to schedule an appointment due to" Leaky Bowels"  Please call her to discuss and give possible appt      Scheduled procedure/appointment date if applicable: Apt/procedure 7/14/21

## 2022-09-21 NOTE — TELEPHONE ENCOUNTER
Patient scheduled for a sooner office visit 10/3/22 with Dr Thais Bryant at 10:30 am  Francis Barbosa left a voicemail for patient to callback to provide further recommendations until she is seen

## 2022-09-22 ENCOUNTER — OFFICE VISIT (OUTPATIENT)
Dept: GASTROENTEROLOGY | Facility: CLINIC | Age: 67
End: 2022-09-22
Payer: COMMERCIAL

## 2022-09-22 VITALS
DIASTOLIC BLOOD PRESSURE: 90 MMHG | SYSTOLIC BLOOD PRESSURE: 158 MMHG | HEIGHT: 66 IN | BODY MASS INDEX: 33.59 KG/M2 | WEIGHT: 209 LBS

## 2022-09-22 DIAGNOSIS — Z86.010 PERSONAL HISTORY OF COLONIC POLYPS: ICD-10-CM

## 2022-09-22 DIAGNOSIS — R19.7 DIARRHEA, UNSPECIFIED TYPE: Primary | ICD-10-CM

## 2022-09-22 PROCEDURE — 99213 OFFICE O/P EST LOW 20 MIN: CPT | Performed by: REGISTERED NURSE

## 2022-09-22 RX ORDER — DICYCLOMINE HYDROCHLORIDE 10 MG/1
10 CAPSULE ORAL 2 TIMES DAILY
Qty: 60 CAPSULE | Refills: 3 | Status: SHIPPED | OUTPATIENT
Start: 2022-09-22 | End: 2022-10-15

## 2022-09-22 RX ORDER — CHOLESTYRAMINE 4 G/9G
1 POWDER, FOR SUSPENSION ORAL
Qty: 30 PACKET | Refills: 2 | Status: SHIPPED | OUTPATIENT
Start: 2022-09-22 | End: 2022-10-03 | Stop reason: ALTCHOICE

## 2022-09-22 NOTE — PROGRESS NOTES
[id number] - Outpatient Follow-up Note  Cornelia Plunkett 75 y o  female MRN: [id number]  Encounter: [id number]    ASSESSMENT AND PLAN:      1  Diarrhea, unspecified type  About 8 weeks of increased diarrhea with intermittent fecal incontinence and intermittent bloating  Story seems consistent with possible infectious/ inflammatory process  Could also be irritable bowel syndrome however does not seem to meet criteria at this time  We discussed checking stool studies and trialing the Questran powder as well as Bentyl  If stool studies are normal then we may need to proceed with colonoscopy sooner even though last 1 was just over a year ago  - Clostridium difficile toxin by PCR with EIA  - Ova and parasite examination  - Stool Enteric Bacterial Panel by PCR  - cholestyramine (QUESTRAN) 4 g packet; Take 1 packet (4 g total) by mouth daily at bedtime  Dispense: 30 packet; Refill: 2  - dicyclomine (BENTYL) 10 mg capsule; Take 1 capsule (10 mg total) by mouth 2 (two) times a day  Dispense: 60 capsule; Refill: 3    2  Personal history of colonic polyps  Previous colonoscopy 05/2021 with a 5 year recall  Next colonoscopy due in 2026      Followup Appointment:  Will keep appointment next month  ______________________________________________________________________    Chief Complaint   Patient presents with    Diarrhea     With incontinence x 8 weeks      Bloated     Severe epigastric 2 days ago     HPI:  A 29-year-old female presents with complaints of loose stools for about 8 weeks  She is also having intermittent incontinence of bowels  She states she will feel as though she has to pass gas and she will actually passed liquid stool of the time  She is waking up in the morning and needing to have a bowel movement which is typically soft, she will then have breakfast and have looser bowel movement  Some days she will continue with loose stools throughout the day    She has also had intermittent days with extreme bloating  A couple weekends ago she was out of town and she felt as though she was constantly running to the bathroom  She denies any abdominal pain or cramping  She states that there is no cramping preceding the episodes of diarrhea  She was on amoxicillin back in  for a dental infection  She denies any sick contacts or any questionable food or water sources  She denies any nausea or vomiting  She does state that she has had decreased appetite due to fear of eating because of diarrhea  Denies any rectal bleeding      Historical Information   Past Medical History:   Diagnosis Date    Arthritis     Diabetes mellitus ([address] )     GERD (gastroesophageal reflux disease)     Renal disorder     Sleep apnea      Past Surgical History:   Procedure Laterality Date     SECTION      COLONOSCOPY      RENAL CYST EXCISION      TONSILLECTOMY      US GUIDED THYROID BIOPSY  2021     Social History     Substance and Sexual Activity   Alcohol Use No     Social History     Substance and Sexual Activity   Drug Use No     Social History     Tobacco Use   Smoking Status Former Smoker    Packs/day: 0 50    Types: Cigarettes    Quit date: 10/29/1981    Years since quittin 9   Smokeless Tobacco Never Used     Family History   Problem Relation Age of Onset    COPD Mother    Kathya Clark Lung cancer Mother    Kathya Clark Lung cancer Father     Hypertension Father     No Known Problems Sister     Hypertension Brother     Colon polyps Brother     Kidney disease Brother     Colon polyps Brother     Stroke Brother     Kidney disease Brother     Hypertension Brother     Hyperlipidemia Brother     Gestational diabetes Sister     Colon cancer Maternal Aunt          Current Outpatient Medications:     aspirin 81 mg chewable tablet    cholecalciferol (VITAMIN D3) 1,000 units tablet    cholestyramine (QUESTRAN) 4 g packet    dicyclomine (BENTYL) 10 mg capsule    escitalopram (LEXAPRO) 10 mg tablet    losartan (COZAAR) 50 mg tablet    metFORMIN (GLUCOPHAGE) 1000 MG tablet    pantoprazole (PROTONIX) 40 mg tablet    Polyethyl Glycol-Propyl Glycol 0 4-0 3 % GEL    simvastatin (ZOCOR) 40 mg tablet    ascorbic acid (VITAMIN C) 250 mg tablet    CVS D3 50 MCG (2000 UT) CAPS    halobetasol (ULTRAVATE) 0 05 % ointment    meclizine (ANTIVERT) 12 5 MG tablet    Multiple Vitamins-Minerals (AIRBORNE GUMMIES PO)  Allergies   Allergen Reactions    Demerol [Meperidine] GI Intolerance     Other reaction(s): N/V    Morphine GI Intolerance     Other reaction(s): N/V    Meloxicam Swelling     Reviewed medications and allergies and updated as indicated    PHYSICAL EXAM:    Blood pressure 158/90, height 5' 6" (1 676 m), weight 94 8 kg (209 lb)  Body mass index is 33 73 kg/m²  General Appearance: NAD, cooperative, alert  Eyes: Anicteric, PERRLA, EOMI  ENT:  Normocephalic, atraumatic, normal mucosa  Neck:  Supple, symmetrical, trachea midline  Resp:  Clear to auscultation bilaterally; no rales, rhonchi or wheezing; respirations unlabored   CV:  S1 S2, Regular rate and rhythm; no murmur, rub, or gallop  GI:  Soft, non-tender, non-distended; normal bowel sounds; no masses, no organomegaly   Rectal: Deferred  Musculoskeletal: No cyanosis, clubbing or edema  Normal ROM    Skin:  No jaundice, rashes, or lesions   Heme/Lymph: No palpable cervical lymphadenopathy  Psych: Normal affect, good eye contact  Neuro: No gross deficits, AAOx3    Lab Results:   Lab Results   Component Value Date    WBC 6 56 08/03/2021    HGB 12 4 08/03/2021    HCT 39 2 08/03/2021    MCV 91 08/03/2021     08/03/2021     Lab Results   Component Value Date    K 4 4 09/22/2021     09/22/2021    CO2 24 09/22/2021    BUN 12 09/22/2021    CREATININE 0 71 09/22/2021    CALCIUM 9 6 08/03/2021    AST 22 09/22/2021    ALT 33 (H) 09/22/2021    ALKPHOS 100 08/03/2021    EGFR 86 08/03/2021     No results found for: IRON, TIBC, FERRITIN  No results found for: LIPASE    Radiology Results:   No results found

## 2022-09-29 ENCOUNTER — TELEPHONE (OUTPATIENT)
Dept: GASTROENTEROLOGY | Facility: CLINIC | Age: 67
End: 2022-09-29

## 2022-09-29 LAB
ADV 40+41 DNA STL QL NAA+NON-PROBE: NOT DETECTED
ASTRO TYP 1-8 RNA STL QL NAA+NON-PROBE: NOT DETECTED
C CAYETANENSIS DNA STL QL NAA+NON-PROBE: NOT DETECTED
C COLI+JEJ+UPSA DNA STL QL NAA+NON-PROBE: NOT DETECTED
C DIF TOX TCDA+TCDB STL QL NAA+NON-PROBE: NOT DETECTED
C DIFF TOX GENS STL QL NAA+PROBE: NEGATIVE
CRYPTOSP DNA STL QL NAA+NON-PROBE: NOT DETECTED
E COLI O157 DNA STL QL NAA+NON-PROBE: NORMAL
E HISTOLYT DNA STL QL NAA+NON-PROBE: NOT DETECTED
EAEC PAA PLAS AGGR+AATA ST NAA+NON-PRB: NOT DETECTED
EC STX1+STX2 GENES STL QL NAA+NON-PROBE: NOT DETECTED
EPEC EAE GENE STL QL NAA+NON-PROBE: NOT DETECTED
ETEC LTA+ST1A+ST1B TOX ST NAA+NON-PROBE: NOT DETECTED
G LAMBLIA DNA STL QL NAA+NON-PROBE: NOT DETECTED
Lab: NORMAL
NOROVIRUS GI+II RNA STL QL NAA+NON-PROBE: NOT DETECTED
O+P STL CONC: NORMAL
P SHIGELLOIDES DNA STL QL NAA+NON-PROBE: NOT DETECTED
RVA RNA STL QL NAA+NON-PROBE: NOT DETECTED
S ENT+BONG DNA STL QL NAA+NON-PROBE: NOT DETECTED
SAPO I+II+IV+V RNA STL QL NAA+NON-PROBE: NOT DETECTED
SHIGELLA SP+EIEC IPAH ST NAA+NON-PROBE: NOT DETECTED
V CHOL+PARA+VUL DNA STL QL NAA+NON-PROBE: NOT DETECTED
V CHOLERAE DNA STL QL NAA+NON-PROBE: NOT DETECTED
Y ENTEROCOL DNA STL QL NAA+NON-PROBE: NOT DETECTED

## 2022-09-29 NOTE — TELEPHONE ENCOUNTER
Patient called for stool study results  Dropped them off at [facility] last Friday  I told her we could check on the results and get back to her    [phone]

## 2022-10-03 ENCOUNTER — OFFICE VISIT (OUTPATIENT)
Dept: GASTROENTEROLOGY | Facility: CLINIC | Age: 67
End: 2022-10-03
Payer: COMMERCIAL

## 2022-10-03 VITALS
DIASTOLIC BLOOD PRESSURE: 58 MMHG | BODY MASS INDEX: 35.39 KG/M2 | WEIGHT: 212.4 LBS | HEIGHT: 65 IN | SYSTOLIC BLOOD PRESSURE: 124 MMHG

## 2022-10-03 DIAGNOSIS — K59.1 FUNCTIONAL DIARRHEA: Primary | ICD-10-CM

## 2022-10-03 PROCEDURE — 99213 OFFICE O/P EST LOW 20 MIN: CPT | Performed by: INTERNAL MEDICINE

## 2022-10-03 NOTE — PROGRESS NOTES
[id number] - Outpatient Follow-up Note  Cornelia Plunkett 75 y o  female MRN: [id number]  Encounter: [id number]    ASSESSMENT AND PLAN:      1  Functional diarrhea  Initially seen in the office  with about 2 months of diarrhea and urgent stools  Stools negative for infection  Did not tolerate Questran due to nausea  Now asymptomatic on dicyclomine  Suspect a post infectious IBS or antibiotic associated diarrhea  Okay to reduce the dicyclomine to once daily then taper off as tolerated  We discussed starting a probiotic if symptoms start to recur  She will contact me if symptoms recur    2  Personal history of colon polyps  [place] May 2021, 5 year recall      Followup Appointment:  As needed  ______________________________________________________________________    Chief complaint: Diarrhea      HPI:  The patient presents for follow-up on diarrhea  She was seen in our office  with about 2 months of diarrhea and intermittent fecal incontinence  She had antibiotics in   Stool studies were negative for infection including C diff  Questran was prescribed but she self-discontinued after 1 dose due to nausea  She was started on dicyclomine before breakfast and before bedtime with immediate improvement in symptoms  She has no further abdominal cramping or bloating  She is back to her typical pattern of 1 to 2 stools stools daily  Her appetite is good and her weight is stable    She is very satisfied with symptom improvement    Historical Information   Past Medical History:   Diagnosis Date    Arthritis     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     Diabetes mellitus ([address] )     GERD (gastroesophageal reflux disease)     Renal disorder     Sleep apnea      Past Surgical History:   Procedure Laterality Date     SECTION      COLONOSCOPY      RENAL CYST EXCISION      TONSILLECTOMY      US GUIDED THYROID BIOPSY  2021 Social History     Substance and Sexual Activity   Alcohol Use No     Social History     Substance and Sexual Activity   Drug Use No     Social History     Tobacco Use   Smoking Status Former Smoker    Packs/day: 0 50    Types: Cigarettes    Quit date: 10/29/1981    Years since quittin 9   Smokeless Tobacco Never Used     Family History   Problem Relation Age of Onset    COPD Mother    Saima Mare Lung cancer Mother    Saima Mare Lung cancer Father     Hypertension Father     No Known Problems Sister     Hypertension Brother     Colon polyps Brother     Kidney disease Brother     Colon polyps Brother     Stroke Brother     Kidney disease Brother     Hypertension Brother     Hyperlipidemia Brother     Gestational diabetes Sister     Colon cancer Maternal Aunt          Current Outpatient Medications:     aspirin 81 mg chewable tablet    cholecalciferol (VITAMIN D3) 1,000 units tablet    dicyclomine (BENTYL) 10 mg capsule    escitalopram (LEXAPRO) 10 mg tablet    losartan (COZAAR) 50 mg tablet    metFORMIN (GLUCOPHAGE) 1000 MG tablet    pantoprazole (PROTONIX) 40 mg tablet    simvastatin (ZOCOR) 40 mg tablet    halobetasol (ULTRAVATE) 0 05 % ointment  Allergies   Allergen Reactions    Demerol [Meperidine] GI Intolerance     Other reaction(s): N/V    Morphine GI Intolerance     Other reaction(s): N/V    Meloxicam Swelling     Reviewed medications and allergies and updated as indicated    PHYSICAL EXAM:    Blood pressure 124/58, height 5' 5 25" (1 657 m), weight 96 3 kg (212 lb 6 4 oz)  Body mass index is 35 07 kg/m²  General Appearance: NAD, cooperative, alert  Eyes: Anicteric, PERRLA, EOMI  ENT:  Normocephalic, atraumatic, normal mucosa  Neck:  Supple, symmetrical, trachea midline  Resp:  Clear to auscultation bilaterally; no rales, rhonchi or wheezing; respirations unlabored   CV:  S1 S2, Regular rate and rhythm; no murmur, rub, or gallop    GI:  Soft, non-tender, non-distended; normal bowel sounds; no masses, no organomegaly   Rectal: Deferred  Musculoskeletal: No cyanosis, clubbing or edema  Normal ROM  Skin:  No jaundice, rashes, or lesions   Heme/Lymph: No palpable cervical lymphadenopathy  Psych: Normal affect, good eye contact  Neuro: No gross deficits, AAOx3    Lab Results:   Lab Results   Component Value Date    WBC 6 56 08/03/2021    HGB 12 4 08/03/2021    HCT 39 2 08/03/2021    MCV 91 08/03/2021     08/03/2021     Lab Results   Component Value Date    K 4 4 09/22/2021     09/22/2021    CO2 24 09/22/2021    BUN 12 09/22/2021    CREATININE 0 71 09/22/2021    CALCIUM 9 6 08/03/2021    AST 22 09/22/2021    ALT 33 (H) 09/22/2021    ALKPHOS 100 08/03/2021    EGFR 86 08/03/2021     No results found for: IRON, TIBC, FERRITIN  No results found for: LIPASE    Radiology Results:   No results found

## 2022-10-03 NOTE — PATIENT INSTRUCTIONS
We will try reducing the Bentyl/dicyclomine to once daily  If the symptoms remain well controlled, you can stop it after 1 week  In the future this medication can be used on an as-needed basis if the symptoms recur  If the symptoms persist specially if the bloating recurs, start a daily multi strain probiotic like align, Florastor, Culturelle, or [employer]    If the symptoms flare in the future please contact me to discuss further workup

## 2022-10-05 LAB
LEFT EYE DIABETIC RETINOPATHY: NORMAL
RIGHT EYE DIABETIC RETINOPATHY: NORMAL

## 2022-10-15 DIAGNOSIS — R19.7 DIARRHEA, UNSPECIFIED TYPE: ICD-10-CM

## 2022-10-15 RX ORDER — DICYCLOMINE HYDROCHLORIDE 10 MG/1
CAPSULE ORAL
Qty: 180 CAPSULE | Refills: 2 | Status: SHIPPED | OUTPATIENT
Start: 2022-10-15

## 2022-11-04 ENCOUNTER — EVALUATION (OUTPATIENT)
Dept: PHYSICAL THERAPY | Facility: CLINIC | Age: 67
End: 2022-11-04

## 2022-11-04 DIAGNOSIS — M25.511 RIGHT SHOULDER PAIN, UNSPECIFIED CHRONICITY: Primary | ICD-10-CM

## 2022-11-04 NOTE — PROGRESS NOTES
PT Evaluation     Today's date: 2022  Patient name: David Brandt  : [id number]  MRN: [id number]  Referring provider: Dirk Alicia MD  Dx:   Encounter Diagnosis     ICD-10-CM    1  Right shoulder pain, unspecified chronicity  M25 [id number]        Start Time: 1015  Stop Time: 1100  Total time in clinic (min): 45 minutes    Assessment  Assessment details: Patient is a 75 y o  female presenting to initial examination with chief complaint of R shoulder pain  Signs and symptoms are consistent with R shoulder impingement syndrome as well as possible "frozen" shoulder pathology, as a result of "babying" and limiting her R shoulder use in the past year  Primary impairments include impaired R shoulder ROM and weakness of R shoulder  As a result of impairments patient experiences limitations with functional/daily activities including any use of RUE, for ADLs, lifting, as well as sleeping  Educated patient regarding plan of care and answered all patient questions to patient satisfaction  Patient would benefit from skilled PT interventions to address above impairments in order to maximize functional capacity  Thank you for the referral     Impairments: abnormal muscle firing, abnormal muscle tone, abnormal or restricted ROM, activity intolerance, impaired physical strength, lacks appropriate home exercise program, pain with function, scapular dyskinesis, weight-bearing intolerance and poor posture   Understanding of Dx/Px/POC: good   Prognosis: good    Goals  Impairment Goals: 4-6 weeks  - Patient to decrease pain by 1-4 levels to 0-2/10 at worst for improved activity tolerance       Functional Goals: by discharge  - Patient to discharge to independent [facility]  - Patient to increase FOTO to at least 65 for improved overall functional mobility    - Patient to have improved R shoulder ROM to [facility] t/o in all ranges for improved ease with overhead reaching and lifting    - Patient to have improved strength of RUE to at least 4/5 - 4+/5 t/o for improved ease with ADLs  - Patient to have less sleep disturbances for improved sleep quality  Plan  Patient would benefit from: skilled physical therapy  Referral necessary: Yes  Planned modality interventions: cryotherapy and thermotherapy: hydrocollator packs  Planned therapy interventions: joint mobilization, manual therapy, massage, motor coordination training, neuromuscular re-education, patient education, postural training, strengthening, stretching, therapeutic activities, therapeutic exercise, therapeutic training, flexibility, functional ROM exercises, gait training, home exercise program, body mechanics training, behavior modification, activity modification and abdominal trunk stabilization  Frequency: 2x week  Duration in visits: 20  Duration in weeks: 8  Plan of Care beginning date: 11/4/2022  Plan of Care expiration date: 12/30/2022  Treatment plan discussed with: patient        Subjective Evaluation    History of Present Illness  Mechanism of injury: WORK/SCHOOL: retired    HOME LIFE: lives in 3 story house with spouse and adult daughter who has a 9year old daughter   HOBBIES/EXERCISE: scrapbooking, crafts   PLOF: No prior history of R shoulder pain or injury   HISTORY OF CURRENT INJURY:  Pt presents to PT with chief complaint of limited ROM of R shoulder, getting progressively worse for at least a year, possibly longer  Pt started favoring her R shoulder by using her LUE more, and then pt noticed that mobility got more limited and started to get pain with movement  Saw ortho a few weeks ago and had injection, with some relief noted  Had xrays taken, 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) as well as some bone spurring  Pt presents to PT for PT eval and treat for treatment before considering MRI  Does have sleep disturbances, gets about 5 hours of sleep/night   Also noted that she has lost about 12# in the past few weeks, as she thinks it's due to eating less and not being as hungry  PAIN LOCATION/DESCRIPTORS: deltoid region, UT region   AGGRAVATING FACTORS: laying on R side, any movement of RUE  EASES:  Nothing really specific; did use ice previously but stopped after she iced 24 hours after injection  Feels heat doesn't really help  DAY PATTERN: dependent on activity   IMAGING:  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) and some bone spurring   SPECIAL QUESTIONS:    Patricio Waggoner denies any radicular symptoms  PATIENT GOALS: Improve use of R shoulder and decrease pain     Pain  Current pain ratin  At best pain ratin  At worst pain ratin  Quality: dull ache and sharp (more sharp with movement  )  Relieving factors: rest and ice  Aggravating factors: overhead activity  Progression: worsening    Hand dominance: right      Diagnostic Tests  X-ray: abnormal (benign tumor in R shoulder region and some bone spurring)  Treatments  Current treatment: physical therapy  Patient Goals  Patient goals for therapy: increased strength, decreased pain, increased motion and independence with ADLs/IADLs          Objective     Active Range of Motion     Right Shoulder   Flexion: 100 degrees   Abduction: 33 degrees   External rotation 0°: 40 degrees   Internal rotation 0°: 50 degrees     Additional Active Range of Motion Details  ROM assessed in supine  Pt very guarded with this  However, after PROM/MT, pt was able to achieve improved ROM/mobility  Strength/Myotome Testing     Additional Strength Details  Unable to assess RUE strength due to pain and guarding  LUE grossly 4+/5 t/o  Tests     Right Shoulder   Positive Hawkin's  Additional Tests Details  Unable to assess Empty can due to poor tolerance; however, suspected impingement syndrome based upon (+) Hawkin's  Pt also displays capsular pattern tightness, indicating "frozen" shoulder pathology                 Diagnosis: R shoulder pain   Precautions: currently undergoing treatment for polycystic kidney disease (has some R sided LBP)   Primary impairments: Impaired R shoulder ROM/strength, postural dysfunction, scapular dysfunction   *asterisks by exercise = given for HEP    11/4       Manuals        Gentle oscillations with ant/post/inf glides mobs Completed, NA       R shoulder PROM with gentle distraction Completed, NA                               There Ex        Pulleys - flexion/scaption        Shoulder shrugs/retro rolls and scap retractions        Table slides - flex/scaption        Isometrics - all dir         Bicep curls         Standing cane ext/IR        Supine cane flexion/scaption/ER        S/L shoulder flexion (start with AA)        S/L shoulder ER         Prone row        Prone T, V, I's when able                        Neuro Re-Ed                                                             Ther Act/Gait              issue HEP   issued - pendulums, shoulder shrugs/rolls/retractions  *please issue isometrics this visit for HEP*          Re-evaluation             Modalities

## 2022-11-04 NOTE — LETTER
2022    Braeden Cifuentes  [address]  [place]    Patient: Ashish Shell   YOB: 1955   Date of Visit: 2022     Encounter Diagnosis     ICD-10-CM    1  Right shoulder pain, unspecified chronicity  M25 511        Dear Dr Alli Mann:    Thank you for your recent referral of Ashish Shell  Please review the attached evaluation summary from Yun's recent visit  Please verify that you agree with the plan of care by signing the attached order  If you have any questions or concerns, please do not hesitate to call  I sincerely appreciate the opportunity to share in the care of one of your patients and hope to have another opportunity to work with you in the near future  Sincerely,    Casper June, PT      Referring Provider:      I certify that I have read the below Plan of Care and certify the need for these services furnished under this plan of treatment while under my care  Mitzi Simmons MD  [address] [place] [id number]  Via Fax: [phone]          PT Evaluation     Today's date: 2022  Patient name: Ashish Shell  : 14[birth year]  MRN: [id number]  Referring provider: Nico Auguste MD  Dx:   Encounter Diagnosis     ICD-10-CM    1  Right shoulder pain, unspecified chronicity  M25 511        Start Time: 1015  Stop Time: 1100  Total time in clinic (min): 45 minutes    Assessment  Assessment details: Patient is a 75 y o  female presenting to initial examination with chief complaint of R shoulder pain  Signs and symptoms are consistent with R shoulder impingement syndrome as well as possible "frozen" shoulder pathology, as a result of "babying" and limiting her R shoulder use in the past year  Primary impairments include impaired R shoulder ROM and weakness of R shoulder   As a result of impairments patient experiences limitations with functional/daily activities including any use of RUE, for ADLs, lifting, as well as sleeping  Educated patient regarding plan of care and answered all patient questions to patient satisfaction  Patient would benefit from skilled PT interventions to address above impairments in order to maximize functional capacity  Thank you for the referral     Impairments: abnormal muscle firing, abnormal muscle tone, abnormal or restricted ROM, activity intolerance, impaired physical strength, lacks appropriate home exercise program, pain with function, scapular dyskinesis, weight-bearing intolerance and poor posture   Understanding of Dx/Px/POC: good   Prognosis: good    Goals  Impairment Goals: 4-6 weeks  - Patient to decrease pain by 1-4 levels to 0-2/10 at worst for improved activity tolerance  Functional Goals: by discharge  - Patient to discharge to independent HEP  - Patient to increase FOTO to at least 65 for improved overall functional mobility    - Patient to have improved R shoulder ROM to [facility] t/o in all ranges for improved ease with overhead reaching and lifting    - Patient to have improved strength of RUE to at least 4/5 - 4+/5 t/o for improved ease with ADLs  - Patient to have less sleep disturbances for improved sleep quality       Plan  Patient would benefit from: skilled physical therapy  Referral necessary: Yes  Planned modality interventions: cryotherapy and thermotherapy: hydrocollator packs  Planned therapy interventions: joint mobilization, manual therapy, massage, motor coordination training, neuromuscular re-education, patient education, postural training, strengthening, stretching, therapeutic activities, therapeutic exercise, therapeutic training, flexibility, functional ROM exercises, gait training, home exercise program, body mechanics training, behavior modification, activity modification and abdominal trunk stabilization  Frequency: 2x week  Duration in visits: 20  Duration in weeks: 8  Plan of Care beginning date: 11/4/2022  Plan of Care expiration date: 12/30/2022  Treatment plan discussed with: patient        Subjective Evaluation    History of Present Illness  Mechanism of injury: WORK/SCHOOL: retired    HOME LIFE: lives in 3 story house with spouse and adult daughter who has a 9year old daughter   HOBBIES/EXERCISE: scrapbooking, crafts   PLOF: No prior history of R shoulder pain or injury   HISTORY OF CURRENT INJURY:  Pt presents to PT with chief complaint of limited ROM of R shoulder, getting progressively worse for at least a year, possibly longer  Pt started favoring her R shoulder by using her LUE more, and then pt noticed that mobility got more limited and started to get pain with movement  Saw ortho a few weeks ago and had injection, with some relief noted  Had xrays taken, 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) as well as some bone spurring  Pt presents to PT for PT eval and treat for treatment before considering MRI  Does have sleep disturbances, gets about 5 hours of sleep/night  Also noted that she has lost about 12# in the past few weeks, as she thinks it's due to eating less and not being as hungry  PAIN LOCATION/DESCRIPTORS: deltoid region, UT region   AGGRAVATING FACTORS: laying on R side, any movement of RUE  EASES:  Nothing really specific; did use ice previously but stopped after she iced 24 hours after injection  Feels heat doesn't really help  DAY PATTERN: dependent on activity   IMAGING:  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) and some bone spurring   SPECIAL QUESTIONS:    Gamaliel Bell denies any radicular symptoms     PATIENT GOALS: Improve use of R shoulder and decrease pain     Pain  Current pain ratin  At best pain ratin  At worst pain ratin  Quality: dull ache and sharp (more sharp with movement  )  Relieving factors: rest and ice  Aggravating factors: overhead activity  Progression: worsening    Hand dominance: right      Diagnostic Tests  X-ray: abnormal (benign tumor in R shoulder region and some bone spurring)  Treatments  Current treatment: physical therapy  Patient Goals  Patient goals for therapy: increased strength, decreased pain, increased motion and independence with ADLs/IADLs          Objective     Active Range of Motion     Right Shoulder   Flexion: 100 degrees   Abduction: 33 degrees   External rotation 0°: 40 degrees   Internal rotation 0°: 50 degrees     Additional Active Range of Motion Details  ROM assessed in supine  Pt very guarded with this  However, after PROM/MT, pt was able to achieve improved ROM/mobility  Strength/Myotome Testing     Additional Strength Details  Unable to assess RUE strength due to pain and guarding  LUE grossly 4+/5 t/o  Tests     Right Shoulder   Positive Hawkin's  Additional Tests Details  Unable to assess Empty can due to poor tolerance; however, suspected impingement syndrome based upon (+) Hawkin's  Pt also displays capsular pattern tightness, indicating "frozen" shoulder pathology                 Diagnosis: R shoulder pain   Precautions: currently undergoing treatment for polycystic kidney disease (has some R sided LBP)   Primary impairments: Impaired R shoulder ROM/strength, postural dysfunction, scapular dysfunction   *asterisks by exercise = given for HEP    11/4       Manuals        Gentle oscillations with ant/post/inf glides mobs Completed, NA       R shoulder PROM with gentle distraction Completed, NA                               There Ex        Pulleys - flexion/scaption        Shoulder shrugs/retro rolls and scap retractions        Table slides - flex/scaption        Isometrics - all dir         Bicep curls         Standing cane ext/IR        Supine cane flexion/scaption/ER        S/L shoulder flexion (start with AA)        S/L shoulder ER         Prone row        Prone T, V, I's when able                        Neuro Re-Ed                                                             Ther Act/Gait              issue HEP issued - pendulums, shoulder shrugs/rolls/retractions  *please issue isometrics this visit for HEP*          Re-evaluation             Modalities

## 2022-11-07 ENCOUNTER — OFFICE VISIT (OUTPATIENT)
Dept: PHYSICAL THERAPY | Facility: CLINIC | Age: 67
End: 2022-11-07

## 2022-11-07 DIAGNOSIS — M25.511 RIGHT SHOULDER PAIN, UNSPECIFIED CHRONICITY: Primary | ICD-10-CM

## 2022-11-07 NOTE — PROGRESS NOTES
Daily Note     Today's date: 2022  Patient name: Juan Pablo Tilley  :   MRN: [id number]  Referring provider: Jean Nuñez MD  Dx:   Encounter Diagnosis     ICD-10-CM    1  Right shoulder pain, unspecified chronicity  M25 511                   Subjective: Patient states continued pain and difficulty with overhead reaching activities  Objective: See treatment diary below      Assessment: Patient demonstrated significant challenge with supine wand scaption and only able to perform 5 reps of exercise secondary to c/o pain; moderate difficulty with sidelying shoulder ER; moderate pain with manual shoulder PROM; patient instructed to add supine wand flexion exercise to HEP  Plan: Continue per plan of care  Diagnosis: R shoulder pain   Precautions: currently undergoing treatment for polycystic kidney disease (has some R sided LBP)   Primary impairments: Impaired R shoulder ROM/strength, postural dysfunction, scapular dysfunction   *asterisks by exercise = given for HEP          Manuals        Gentle oscillations with ant/post/inf glides mobs Completed, NA  Ant/post/inf mob  KK      R shoulder PROM with gentle distraction Completed, NA KK                              There Ex        Pulleys - flexion/abd   3 min ea        Shoulder shrugs/retro rolls and scap retractions        Table slides - flex/scaption        Isometrics - all dir         Bicep curls         Standing cane ext/IR        Supine cane flexion/scaption/ER   10x10" flex  5x10" scaption 10x10" ER      S/L shoulder flexion (start with AA)        S/L shoulder ER    1x10, 1x5      Prone row        Prone T, V, I's when able                        Neuro Re-Ed                                                             Ther Act/Gait              issue HEP   issued - pendulums, shoulder shrugs/rolls/retractions            Re-evaluation             Modalities

## 2022-11-11 ENCOUNTER — APPOINTMENT (OUTPATIENT)
Dept: PHYSICAL THERAPY | Facility: CLINIC | Age: 67
End: 2022-11-11

## 2022-11-15 ENCOUNTER — APPOINTMENT (OUTPATIENT)
Dept: PHYSICAL THERAPY | Facility: CLINIC | Age: 67
End: 2022-11-15

## 2022-11-18 ENCOUNTER — OFFICE VISIT (OUTPATIENT)
Dept: PHYSICAL THERAPY | Facility: CLINIC | Age: 67
End: 2022-11-18

## 2022-11-18 ENCOUNTER — APPOINTMENT (OUTPATIENT)
Dept: PHYSICAL THERAPY | Facility: CLINIC | Age: 67
End: 2022-11-18

## 2022-11-18 DIAGNOSIS — M25.511 RIGHT SHOULDER PAIN, UNSPECIFIED CHRONICITY: Primary | ICD-10-CM

## 2022-11-18 NOTE — PROGRESS NOTES
Daily Note     Today's date: 2022  Patient name: Cornelia Plunkett  : 4809  MRN: [id number]  Referring provider: Nicol Sheppard MD  Dx:   Encounter Diagnosis     ICD-10-CM    1  Right shoulder pain, unspecified chronicity  M25 511                      Subjective: pt notes that her pain has not changed and is very painful with minimal motion  Objective: See treatment diary below      Assessment:   Patient very TTP over anterior shoulder and biceps region  With slow passive oscillations and movement into all motions she was able to get further motion with pain remaining at the end ranges only  Pt is to hold off on any exercise that is overhead  Only to do things she needs to do with right arm and not to "test" if it still hurts as this is making inflammation worse in shoulder and it needs to decrease to help pain sxs  Encouraged to use cold pack at home to help with sxs also  Pt verbalizes understanding of all discussed in session  Plan: Continue per plan of care  Diagnosis: R shoulder pain   Precautions: currently undergoing treatment for polycystic kidney disease (has some R sided LBP)   Primary impairments: Impaired R shoulder ROM/strength, postural dysfunction, scapular dysfunction   *asterisks by exercise = given for HEP         Manuals        Gentle oscillations with ant/post/inf glides mobs Completed, NA  Ant/post/inf mob  KK DL -glides and oscillations     R shoulder PROM with gentle distraction Completed, NA KK DL     STM   DL-minimal tolerance                     There Ex        Pulleys - flexion/abd   3 min ea        Scap retractions   x20     Table slides - flex/scaption        Isometrics - all dir         Bicep curls         Standing cane ext/IR        Supine cane flexion/scaption/ER   10x10" flex  5x10" scaption 10x10" ER      S/L shoulder flexion (start with AA)        S/L shoulder ER    1x10, 1x5      Prone row        Prone T, V, I's when able Neuro Re-Ed                                                             Ther Act/Gait              issue HEP   issued - pendulums, shoulder shrugs/rolls/retractions            Re-evaluation             Modalities

## 2022-11-28 ENCOUNTER — APPOINTMENT (OUTPATIENT)
Dept: PHYSICAL THERAPY | Facility: CLINIC | Age: 67
End: 2022-11-28

## 2022-11-30 ENCOUNTER — APPOINTMENT (OUTPATIENT)
Dept: PHYSICAL THERAPY | Facility: CLINIC | Age: 67
End: 2022-11-30

## 2023-03-22 ENCOUNTER — APPOINTMENT (EMERGENCY)
Dept: CT IMAGING | Facility: HOSPITAL | Age: 68
End: 2023-03-22

## 2023-03-22 ENCOUNTER — APPOINTMENT (OUTPATIENT)
Dept: MRI IMAGING | Facility: HOSPITAL | Age: 68
End: 2023-03-22

## 2023-03-22 ENCOUNTER — HOSPITAL ENCOUNTER (OUTPATIENT)
Facility: HOSPITAL | Age: 68
Setting detail: OBSERVATION
Discharge: HOME | End: 2023-03-23
Attending: EMERGENCY MEDICINE | Admitting: STUDENT IN AN ORGANIZED HEALTH CARE EDUCATION/TRAINING PROGRAM

## 2023-03-22 DIAGNOSIS — H53.9 VISUAL DISTURBANCE: ICD-10-CM

## 2023-03-22 DIAGNOSIS — E78.5 HYPERLIPIDEMIA, UNSPECIFIED HYPERLIPIDEMIA TYPE: ICD-10-CM

## 2023-03-22 DIAGNOSIS — I16.0 HYPERTENSIVE URGENCY: Primary | ICD-10-CM

## 2023-03-22 DIAGNOSIS — R51.9 HEADACHE: ICD-10-CM

## 2023-03-22 LAB
2HR DELTA HS TROPONIN: 0 NG/L
4HR DELTA HS TROPONIN: 1 NG/L
ANION GAP SERPL CALCULATED.3IONS-SCNC: 9 MMOL/L (ref 4–13)
APTT PPP: 29 SECONDS (ref 23–37)
ATRIAL RATE: 82 BPM
BUN SERPL-MCNC: 15 MG/DL (ref 5–25)
CALCIUM SERPL-MCNC: 10.5 MG/DL (ref 8.4–10.2)
CARDIAC TROPONIN I PNL SERPL HS: 4 NG/L
CARDIAC TROPONIN I PNL SERPL HS: 4 NG/L
CARDIAC TROPONIN I PNL SERPL HS: 5 NG/L
CHLORIDE SERPL-SCNC: 103 MMOL/L (ref 96–108)
CO2 SERPL-SCNC: 27 MMOL/L (ref 21–32)
CREAT SERPL-MCNC: 0.66 MG/DL (ref 0.6–1.3)
CRP SERPL QL: 11.7 MG/L
ERYTHROCYTE [DISTWIDTH] IN BLOOD BY AUTOMATED COUNT: 12.6 % (ref 11.6–15.1)
ERYTHROCYTE [SEDIMENTATION RATE] IN BLOOD: 41 MM/HOUR (ref 0–29)
EST. AVERAGE GLUCOSE BLD GHB EST-MCNC: 140 MG/DL
FLUAV RNA RESP QL NAA+PROBE: NEGATIVE
FLUBV RNA RESP QL NAA+PROBE: NEGATIVE
GFR SERPL CREATININE-BSD FRML MDRD: 91 ML/MIN/1.73SQ M
GLUCOSE SERPL-MCNC: 148 MG/DL (ref 65–140)
GLUCOSE SERPL-MCNC: 159 MG/DL (ref 65–140)
GLUCOSE SERPL-MCNC: 168 MG/DL (ref 65–140)
HBA1C MFR BLD: 6.5 %
HCT VFR BLD AUTO: 37.5 % (ref 34.8–46.1)
HGB BLD-MCNC: 11.8 G/DL (ref 11.5–15.4)
INR PPP: 0.91 (ref 0.84–1.19)
MCH RBC QN AUTO: 28.4 PG (ref 26.8–34.3)
MCHC RBC AUTO-ENTMCNC: 31.5 G/DL (ref 31.4–37.4)
MCV RBC AUTO: 90 FL (ref 82–98)
P AXIS: 61 DEGREES
PLATELET # BLD AUTO: 210 THOUSANDS/UL (ref 149–390)
PMV BLD AUTO: 8.9 FL (ref 8.9–12.7)
POTASSIUM SERPL-SCNC: 3.9 MMOL/L (ref 3.5–5.3)
PR INTERVAL: 188 MS
PROTHROMBIN TIME: 12.9 SECONDS (ref 11.6–14.5)
QRS AXIS: 38 DEGREES
QRSD INTERVAL: 76 MS
QT INTERVAL: 354 MS
QTC INTERVAL: 413 MS
RBC # BLD AUTO: 4.16 MILLION/UL (ref 3.81–5.12)
RSV RNA RESP QL NAA+PROBE: NEGATIVE
SARS-COV-2 RNA RESP QL NAA+PROBE: NEGATIVE
SODIUM SERPL-SCNC: 139 MMOL/L (ref 135–147)
T WAVE AXIS: 61 DEGREES
VENTRICULAR RATE: 82 BPM
WBC # BLD AUTO: 7.41 THOUSAND/UL (ref 4.31–10.16)

## 2023-03-22 RX ORDER — PRAVASTATIN SODIUM 80 MG/1
80 TABLET ORAL
Status: DISCONTINUED | OUTPATIENT
Start: 2023-03-23 | End: 2023-03-23 | Stop reason: HOSPADM

## 2023-03-22 RX ORDER — DIPHENHYDRAMINE HYDROCHLORIDE 50 MG/ML
25 INJECTION INTRAMUSCULAR; INTRAVENOUS ONCE
Status: COMPLETED | OUTPATIENT
Start: 2023-03-22 | End: 2023-03-22

## 2023-03-22 RX ORDER — PANTOPRAZOLE SODIUM 40 MG/1
40 TABLET, DELAYED RELEASE ORAL DAILY
Status: DISCONTINUED | OUTPATIENT
Start: 2023-03-23 | End: 2023-03-23 | Stop reason: HOSPADM

## 2023-03-22 RX ORDER — MAGNESIUM SULFATE 1 G/100ML
1 INJECTION INTRAVENOUS ONCE
Status: COMPLETED | OUTPATIENT
Start: 2023-03-22 | End: 2023-03-22

## 2023-03-22 RX ORDER — MELATONIN
2000 DAILY
Status: DISCONTINUED | OUTPATIENT
Start: 2023-03-23 | End: 2023-03-23 | Stop reason: HOSPADM

## 2023-03-22 RX ORDER — TETRACAINE HYDROCHLORIDE 5 MG/ML
2 SOLUTION OPHTHALMIC ONCE
Status: COMPLETED | OUTPATIENT
Start: 2023-03-22 | End: 2023-03-22

## 2023-03-22 RX ORDER — GUAIFENESIN/DEXTROMETHORPHAN 100-10MG/5
10 SYRUP ORAL EVERY 6 HOURS PRN
Status: DISCONTINUED | OUTPATIENT
Start: 2023-03-22 | End: 2023-03-23 | Stop reason: HOSPADM

## 2023-03-22 RX ORDER — ASPIRIN 81 MG/1
81 TABLET, CHEWABLE ORAL EVERY OTHER DAY
Status: DISCONTINUED | OUTPATIENT
Start: 2023-03-22 | End: 2023-03-23

## 2023-03-22 RX ORDER — LOSARTAN POTASSIUM 50 MG/1
50 TABLET ORAL DAILY
Status: DISCONTINUED | OUTPATIENT
Start: 2023-03-23 | End: 2023-03-23 | Stop reason: HOSPADM

## 2023-03-22 RX ORDER — MAGNESIUM HYDROXIDE/ALUMINUM HYDROXICE/SIMETHICONE 120; 1200; 1200 MG/30ML; MG/30ML; MG/30ML
30 SUSPENSION ORAL EVERY 6 HOURS PRN
Status: DISCONTINUED | OUTPATIENT
Start: 2023-03-22 | End: 2023-03-23 | Stop reason: HOSPADM

## 2023-03-22 RX ORDER — SODIUM CHLORIDE 9 MG/ML
100 INJECTION, SOLUTION INTRAVENOUS CONTINUOUS
Status: DISCONTINUED | OUTPATIENT
Start: 2023-03-22 | End: 2023-03-22

## 2023-03-22 RX ORDER — ACETAMINOPHEN 325 MG/1
650 TABLET ORAL EVERY 4 HOURS PRN
Status: DISCONTINUED | OUTPATIENT
Start: 2023-03-22 | End: 2023-03-23 | Stop reason: HOSPADM

## 2023-03-22 RX ORDER — BUTALBITAL, ACETAMINOPHEN AND CAFFEINE 50; 325; 40 MG/1; MG/1; MG/1
1 TABLET ORAL EVERY 4 HOURS PRN
Status: DISCONTINUED | OUTPATIENT
Start: 2023-03-22 | End: 2023-03-23 | Stop reason: HOSPADM

## 2023-03-22 RX ORDER — HYDRALAZINE HYDROCHLORIDE 20 MG/ML
10 INJECTION INTRAMUSCULAR; INTRAVENOUS ONCE
Status: COMPLETED | OUTPATIENT
Start: 2023-03-22 | End: 2023-03-22

## 2023-03-22 RX ORDER — INSULIN LISPRO 100 [IU]/ML
1-5 INJECTION, SOLUTION INTRAVENOUS; SUBCUTANEOUS
Status: DISCONTINUED | OUTPATIENT
Start: 2023-03-22 | End: 2023-03-23 | Stop reason: HOSPADM

## 2023-03-22 RX ORDER — METOCLOPRAMIDE HYDROCHLORIDE 5 MG/ML
10 INJECTION INTRAMUSCULAR; INTRAVENOUS ONCE
Status: COMPLETED | OUTPATIENT
Start: 2023-03-22 | End: 2023-03-22

## 2023-03-22 RX ORDER — SODIUM CHLORIDE 9 MG/ML
100 INJECTION, SOLUTION INTRAVENOUS CONTINUOUS
Status: DISPENSED | OUTPATIENT
Start: 2023-03-22 | End: 2023-03-23

## 2023-03-22 RX ORDER — ONDANSETRON 2 MG/ML
4 INJECTION INTRAMUSCULAR; INTRAVENOUS EVERY 6 HOURS PRN
Status: DISCONTINUED | OUTPATIENT
Start: 2023-03-22 | End: 2023-03-23 | Stop reason: HOSPADM

## 2023-03-22 RX ADMIN — METOCLOPRAMIDE 10 MG: 5 INJECTION, SOLUTION INTRAMUSCULAR; INTRAVENOUS at 17:03

## 2023-03-22 RX ADMIN — INSULIN LISPRO 1 UNITS: 100 INJECTION, SOLUTION INTRAVENOUS; SUBCUTANEOUS at 22:50

## 2023-03-22 RX ADMIN — ACETAMINOPHEN 650 MG: 325 TABLET, FILM COATED ORAL at 21:06

## 2023-03-22 RX ADMIN — BUTALBITAL, ACETAMINOPHEN, AND CAFFEINE 1 TABLET: 50; 325; 40 TABLET ORAL at 19:16

## 2023-03-22 RX ADMIN — TETRACAINE HYDROCHLORIDE 2 DROP: 5 SOLUTION OPHTHALMIC at 16:23

## 2023-03-22 RX ADMIN — FLUORESCEIN SODIUM 1 STRIP: 1 STRIP OPHTHALMIC at 16:23

## 2023-03-22 RX ADMIN — IOHEXOL 85 ML: 350 INJECTION, SOLUTION INTRAVENOUS at 16:09

## 2023-03-22 RX ADMIN — DIPHENHYDRAMINE HYDROCHLORIDE 25 MG: 50 INJECTION, SOLUTION INTRAMUSCULAR; INTRAVENOUS at 17:02

## 2023-03-22 RX ADMIN — SODIUM CHLORIDE 100 ML/HR: 0.9 INJECTION, SOLUTION INTRAVENOUS at 22:00

## 2023-03-22 RX ADMIN — GADOBUTROL 10 ML: 604.72 INJECTION INTRAVENOUS at 21:51

## 2023-03-22 RX ADMIN — HYDRALAZINE HYDROCHLORIDE 10 MG: 20 INJECTION INTRAMUSCULAR; INTRAVENOUS at 18:24

## 2023-03-22 RX ADMIN — MAGNESIUM SULFATE IN DEXTROSE 1 G: 10 INJECTION, SOLUTION INTRAVENOUS at 20:56

## 2023-03-22 RX ADMIN — GUAIFENESIN SYRUP AND DEXTROMETHORPHAN 10 ML: 100; 10 SYRUP ORAL at 20:54

## 2023-03-22 RX ADMIN — ASPIRIN 81 MG CHEWABLE TABLET 81 MG: 81 TABLET CHEWABLE at 20:54

## 2023-03-22 RX ADMIN — SODIUM CHLORIDE 100 ML/HR: 0.9 INJECTION, SOLUTION INTRAVENOUS at 20:56

## 2023-03-22 NOTE — ED PROVIDER NOTES
History  Chief Complaint   Patient presents with   • Headache     Pt c/o HA since today, states she couldn't see the left edges of the cards she was holding for several minutes and then a second episode an hour later  Patient with pmh renal cysts, HTN, recent right shoulder surgery - hx from patient and medical records -she came in with concern for visual disturbance and headache  Patient states she felt fine her last normal was around 1230 last night when she went to bed  She woke up around 8:30 AM and noticed this peripheral vision disturbance both eyes, laterally  She describes it as she could not see the edges of the cards she was holding  Patient has associated right-sided temporal headache  She took 100 mg Tylenol without relief  Prior to Admission Medications   Prescriptions Last Dose Informant Patient Reported? Taking?   aspirin 81 mg chewable tablet 3/22/2023  Yes Yes   Sig: Chew 81 mg every other day    cholecalciferol (VITAMIN D3) 1,000 units tablet 3/22/2023  Yes Yes   Sig: Take 2,000 Units by mouth daily   dicyclomine (BENTYL) 10 mg capsule   No No   Sig: TAKE 1 CAPSULE BY MOUTH 2 TIMES A DAY  escitalopram (LEXAPRO) 10 mg tablet   Yes No   Sig: Take 10 mg by mouth daily   halobetasol (ULTRAVATE) 0 05 % ointment Not Taking  No No   Sig: Apply daily     Patient not taking: Reported on 7/14/2021   losartan (COZAAR) 50 mg tablet 3/22/2023  No Yes   Sig: TAKE 1 TABLET BY MOUTH EVERY DAY   metFORMIN (GLUCOPHAGE) 1000 MG tablet 3/22/2023  No Yes   Sig: TAKE 1 TABLET BY MOUTH TWICE A DAY WITH MEALS   pantoprazole (PROTONIX) 40 mg tablet 3/22/2023  No Yes   Sig: TAKE 1 TABLET BY MOUTH EVERY DAY   simvastatin (ZOCOR) 40 mg tablet 3/21/2023  Yes Yes   Sig: Take 40 mg by mouth every evening      Facility-Administered Medications: None       Past Medical History:   Diagnosis Date   • Arthritis    • 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    • Diabetes mellitus (HCC)    • GERD (gastroesophageal reflux disease)    • Renal disorder    • Sleep apnea        Past Surgical History:   Procedure Laterality Date   •  SECTION     • COLONOSCOPY     • RENAL CYST EXCISION     • SHOULDER SURGERY      plastic hardware   • TONSILLECTOMY     • US GUIDED THYROID BIOPSY  2021       Family History   Problem Relation Age of Onset   • COPD Mother    • Lung cancer Mother    • Lung cancer Father    • Hypertension Father    • No Known Problems Sister    • Hypertension Brother    • Colon polyps Brother    • Kidney disease Brother    • Colon polyps Brother    • Stroke Brother    • Kidney disease Brother    • Hypertension Brother    • Hyperlipidemia Brother    • Gestational diabetes Sister    • Colon cancer Maternal Aunt      I have reviewed and agree with the history as documented  E-Cigarette/Vaping   • E-Cigarette Use Never User      E-Cigarette/Vaping Substances   • Nicotine No    • THC No    • CBD No    • Flavoring No    • Other No    • Unknown No      Social History     Tobacco Use   • Smoking status: Former     Packs/day: 0 50     Types: Cigarettes     Quit date: 10/29/1981     Years since quittin 4   • Smokeless tobacco: Never   Vaping Use   • Vaping Use: Never used   Substance Use Topics   • Alcohol use: Never   • Drug use: Not Currently       Review of Systems   Constitutional: Negative for chills and fever  HENT: Negative for rhinorrhea and sore throat  Eyes: Positive for visual disturbance  Negative for pain  Respiratory: Negative for shortness of breath  Cardiovascular: Negative for chest pain  Gastrointestinal: Negative for constipation, diarrhea, nausea and vomiting  Genitourinary: Negative for dysuria and frequency  Skin: Negative for rash  Neurological: Positive for headaches  Negative for weakness and numbness  All other systems reviewed and are negative  Physical Exam  Physical Exam  Vitals and nursing note reviewed     Constitutional:       General: She is in acute distress  Appearance: She is well-developed  HENT:      Head: Normocephalic and atraumatic  Jaw: There is normal jaw occlusion  Comments: Mild tenderness right temporal area to percussion and palpation     Right Ear: Tympanic membrane and external ear normal       Left Ear: Tympanic membrane and external ear normal       Nose: Nose normal       Mouth/Throat:      Mouth: Mucous membranes are moist       Pharynx: Oropharynx is clear  Eyes:      General: Lids are everted, no foreign bodies appreciated  Vision grossly intact  Intraocular pressure: Right eye pressure is 13 mmHg  Left eye pressure is 14 mmHg  Extraocular Movements: Extraocular movements intact  Conjunctiva/sclera: Conjunctivae normal       Right eye: Right conjunctiva is not injected  Left eye: Left conjunctiva is not injected  Pupils: Pupils are equal, round, and reactive to light  Right eye: No corneal abrasion  Left eye: No corneal abrasion  Funduscopic exam:     Right eye: No papilledema  Left eye: No papilledema  Slit lamp exam:     Right eye: No hyphema  Left eye: No hyphema  Comments: I did not notice any significant visual field deficit although exams were inconsistent   and patient did describe that she couldn't see the peripheral vision while in the ER   Cardiovascular:      Rate and Rhythm: Normal rate and regular rhythm  Heart sounds: Normal heart sounds  Pulmonary:      Effort: Pulmonary effort is normal  No respiratory distress  Breath sounds: Normal breath sounds  No wheezing  Abdominal:      General: Bowel sounds are normal  There is no distension  Palpations: Abdomen is soft  Tenderness: There is no abdominal tenderness  Musculoskeletal:         General: No deformity  Normal range of motion  Cervical back: Normal range of motion and neck supple  No spinous process tenderness     Skin:     General: Skin is warm and dry  Findings: No rash  Neurological:      General: No focal deficit present  Mental Status: She is alert and oriented to person, place, and time  GCS: GCS eye subscore is 4  GCS verbal subscore is 5  GCS motor subscore is 6  Cranial Nerves: Cranial nerves 2-12 are intact  Sensory: Sensation is intact  No sensory deficit  Motor: Motor function is intact  Coordination: Coordination is intact  Gait: Gait is intact     Psychiatric:         Mood and Affect: Mood normal          Vital Signs  ED Triage Vitals   Temperature Pulse Respirations Blood Pressure SpO2   03/22/23 1546 03/22/23 1546 03/22/23 1546 03/22/23 1546 03/22/23 1600   99 3 °F (37 4 °C) 88 18 (!) 173/74 98 %      Temp Source Heart Rate Source Patient Position - Orthostatic VS BP Location FiO2 (%)   03/22/23 1546 03/22/23 1546 03/22/23 1546 03/22/23 1546 --   Temporal Monitor Sitting Left arm       Pain Score       03/22/23 1601       10 - Worst Possible Pain           Vitals:    03/23/23 0239 03/23/23 0453 03/23/23 0728 03/23/23 0731   BP: 125/62 136/60 124/59 124/59   Pulse: 65 59 60 67   Patient Position - Orthostatic VS:             Visual Acuity  Visual Acuity    Flowsheet Row Most Recent Value   L Pupil Size (mm) 3   R Pupil Size (mm) 3   L Pupil Shape Round   R Pupil Shape Round          ED Medications  Medications   butalbital-acetaminophen-caffeine (FIORICET,ESGIC) -40 mg per tablet 1 tablet (1 tablet Oral Given 3/22/23 1916)   cholecalciferol (VITAMIN D3) tablet 2,000 Units (2,000 Units Oral Given 3/23/23 0834)   losartan (COZAAR) tablet 50 mg (50 mg Oral Given 3/23/23 0834)   pantoprazole (PROTONIX) EC tablet 40 mg (40 mg Oral Given 3/23/23 0834)   pravastatin (PRAVACHOL) tablet 80 mg (has no administration in time range)   ondansetron (ZOFRAN) injection 4 mg (has no administration in time range)   acetaminophen (TYLENOL) tablet 650 mg (650 mg Oral Given 3/22/23 2106)   aluminum-magnesium hydroxide-simethicone (MYLANTA) oral suspension 30 mL (has no administration in time range)   sodium chloride 0 9 % infusion (0 mL/hr Intravenous Stopped 3/23/23 0830)   insulin lispro (HumaLOG) 100 units/mL subcutaneous injection 1-5 Units (1 Units Subcutaneous Given 3/23/23 1131)   dextromethorphan-guaiFENesin (ROBITUSSIN DM) oral syrup 10 mL (10 mL Oral Given 3/22/23 2054)   aspirin chewable tablet 81 mg (81 mg Oral Given 3/23/23 0907)   iohexol (OMNIPAQUE) 350 MG/ML injection (SINGLE-DOSE) 100 mL (85 mL Intravenous Given 3/22/23 1609)   tetracaine 0 5 % ophthalmic solution 2 drop (2 drops Both Eyes Given 3/22/23 1623)   fluorescein sodium sterile ophthalmic strip 1 strip (1 strip Both Eyes Given 3/22/23 1623)   diphenhydrAMINE (BENADRYL) injection 25 mg (25 mg Intravenous Given 3/22/23 1702)   metoclopramide (REGLAN) injection 10 mg (10 mg Intravenous Given 3/22/23 1703)   hydrALAZINE (APRESOLINE) injection 10 mg (10 mg Intravenous Given 3/22/23 1824)   magnesium sulfate IVPB (premix) SOLN 1 g (1 g Intravenous New Bag 3/22/23 2056)   Gadobutrol injection (SINGLE-DOSE) SOLN 10 mL (10 mL Intravenous Given 3/22/23 2151)   diphenhydrAMINE (BENADRYL) injection 25 mg (25 mg Intravenous Given 3/23/23 0303)   metoclopramide (REGLAN) injection 10 mg (10 mg Intravenous Given 3/23/23 0304)   magnesium sulfate IVPB (premix) SOLN 1 g (1 g Intravenous New Bag 3/23/23 0831)   potassium chloride (K-DUR,KLOR-CON) CR tablet 40 mEq (40 mEq Oral Given 3/23/23 0833)       Diagnostic Studies  Results Reviewed     Procedure Component Value Units Date/Time    Hemoglobin A1C [[id number]]  (Abnormal) Collected: 03/22/23 1603    Lab Status: Final result Specimen: Blood Updated: 03/22/23 2157     Hemoglobin A1C 6 5 %       mg/dl     HS Troponin I 4hr [[id number]]  (Normal) Collected: 03/22/23 1936    Lab Status: Final result Specimen: Blood from Line, Venous Updated: 03/22/23 2013     hs TnI 4hr 5 ng/L      Delta 4hr hsTnI 1 ng/L HS Troponin I 2hr [[id number]]  (Normal) Collected: 03/22/23 1819    Lab Status: Final result Specimen: Blood from Line, Venous Updated: 03/22/23 1905     hs TnI 2hr 4 ng/L      Delta 2hr hsTnI 0 ng/L     Sedimentation rate, automated [[id number]]  (Abnormal) Collected: 03/22/23 1819    Lab Status: Final result Specimen: Blood from Arm, Right Updated: 03/22/23 1833     Sed Rate 41 mm/hour     FLU/RSV/COVID - if FLU/RSV clinically relevant [[id number]]  (Normal) Collected: 03/22/23 1638    Lab Status: Final result Specimen: Nares from Nose Updated: 03/22/23 1724     SARS-CoV-2 Negative     INFLUENZA A PCR Negative     INFLUENZA B PCR Negative     RSV PCR Negative    Narrative:      FOR PEDIATRIC PATIENTS - copy/paste COVID Guidelines URL to browser: [email or web address]  [email or web address]    SARS-CoV-2 assay is a Nucleic Acid Amplification assay intended for the  qualitative detection of nucleic acid from SARS-CoV-2 in nasopharyngeal  swabs  Results are for the presumptive identification of SARS-CoV-2 RNA  Positive results are indicative of infection with SARS-CoV-2, the virus  causing COVID-19, but do not rule out bacterial infection or co-infection  with other viruses  Laboratories within the [place] and its  territories are required to report all positive results to the appropriate  public health authorities  Negative results do not preclude SARS-CoV-2  infection and should not be used as the sole basis for treatment or other  patient management decisions  Negative results must be combined with  clinical observations, patient history, and epidemiological information  This test has not been FDA cleared or approved  This test has been authorized by FDA under an Emergency Use Authorization  (EUA)   This test is only authorized for the duration of time the  declaration that circumstances exist justifying the authorization of the  emergency use of an in vitro diagnostic tests for detection of SARS-CoV-2  virus and/or diagnosis of COVID-19 infection under section 564(b)(1) of  the Act, 21 U  S C  387OUM-0(I)(0), unless the authorization is terminated  or revoked sooner  The test has been validated but independent review by FDA  and CLIA is pending  Test performed using [employer] GeneXpert: This RT-PCR assay targets N2,  a region unique to SARS-CoV-2  A conserved region in the E-gene was chosen  for pan-Sarbecovirus detection which includes SARS-CoV-2  According to CMS-2020-01-R, this platform meets the definition of high-throughput technology      HS Troponin 0hr (reflex protocol) [[id number]]  (Normal) Collected: 03/22/23 1604    Lab Status: Final result Specimen: Blood Updated: 03/22/23 1630     hs TnI 0hr 4 ng/L     Basic metabolic panel [[id number]]  (Abnormal) Collected: 03/22/23 1603    Lab Status: Final result Specimen: Blood Updated: 03/22/23 1629     Sodium 139 mmol/L      Potassium 3 9 mmol/L      Chloride 103 mmol/L      CO2 27 mmol/L      ANION GAP 9 mmol/L      BUN 15 mg/dL      Creatinine 0 66 mg/dL      Glucose 159 mg/dL      Calcium 10 5 mg/dL      eGFR 91 ml/min/1 73sq m     Narrative:      [place] guidelines for Chronic Kidney Disease (CKD):   •  Stage 1 with normal or high GFR (GFR > 90 mL/min/1 73 square meters)  •  Stage 2 Mild CKD (GFR = 60-89 mL/min/1 73 square meters)  •  Stage 3A Moderate CKD (GFR = 45-59 mL/min/1 73 square meters)  •  Stage 3B Moderate CKD (GFR = 30-44 mL/min/1 73 square meters)  •  Stage 4 Severe CKD (GFR = 15-29 mL/min/1 73 square meters)  •  Stage 5 End Stage CKD (GFR <15 mL/min/1 73 square meters)  Note: GFR calculation is accurate only with a steady state creatinine    C-reactive protein [[id number]]  (Abnormal) Collected: 03/22/23 1603    Lab Status: Final result Specimen: Blood Updated: 03/22/23 1629     CRP 11 7 mg/L     Protime-INR [[id number]]  (Normal) Collected: 03/22/23 3680    Lab Status: Final result Specimen: Blood Updated: 03/22/23 1623     Protime 12 9 seconds      INR 0 91    APTT [[id number]]  (Normal) Collected: 03/22/23 1603    Lab Status: Final result Specimen: Blood Updated: 03/22/23 1623     PTT 29 seconds     CBC and Platelet [[id number]]  (Normal) Collected: 03/22/23 1603    Lab Status: Final result Specimen: Blood Updated: 03/22/23 1606     WBC 7 41 Thousand/uL      RBC 4 16 Million/uL      Hemoglobin 11 8 g/dL      Hematocrit 37 5 %      MCV 90 fL      MCH 28 4 pg      MCHC 31 5 g/dL      RDW 12 6 %      Platelets 238 Thousands/uL      MPV 8 9 fL     Fingerstick Glucose (POCT) [[id number]]  (Abnormal) Collected: 03/22/23 1600    Lab Status: Final result Updated: 03/22/23 1603     POC Glucose 148 mg/dl                  MRI brain pituitary wo and w contrast   Final Result by Ilda Delcid MD (03/22 2250)         1  Unremarkable exam of the pituitary  No evidence of lesion  2   Mild microangiopathic change  No evidence of acute infarct, intracranial hemorrhage or mass  Workstation performed: [id number]         CTA stroke alert (head/neck)   Final Result by Jem Stockton MD (03/22 1630)      No significant stenosis of the cervical carotid or vertebral arteries  No significant intracranial stenosis, large vessel occlusion or aneurysm  Findings were directly discussed with Meera Garrison at 4:21 PM                            Workstation performed: [id number]         CT stroke alert brain   Final Result by Jem Stockton MD (03/22 1631)      No acute intracranial abnormality  Mild chronic microangiopathy        Findings were directly discussed with Meera Garrison at 4:21 PM       Workstation performed: [id number]         VAS temporal artery duplex    (Results Pending)              Procedures  ECG 12 Lead Documentation Only    Date/Time: 3/22/2023 9:10 PM  Performed by: Myla Brown DO  Authorized by: Myla Brown DO     Indications / Diagnosis:  Headache vision disturbance  ECG reviewed by me, the ED Provider: yes    Patient location:  ED  Previous ECG:     Previous ECG:  Compared to current    Comparison ECG info:  2021    Similarity:  No change  Interpretation:     Interpretation: normal    Rate:     ECG rate:  82    ECG rate assessment: normal    Rhythm:     Rhythm: sinus rhythm    Ectopy:     Ectopy: none    QRS:     QRS axis:  Normal    QRS intervals:  Normal  Conduction:     Conduction: normal    ST segments:     ST segments:  Normal  T waves:     T waves: normal      CriticalCare Time    Date/Time: 3/23/2023 12:09 PM  Performed by: Rigo Barbosa DO  Authorized by: Rigo Barbosa DO     Critical care provider statement:     Critical care time (minutes):  45    Critical care time was exclusive of:  Separately billable procedures and treating other patients and teaching time    Critical care was necessary to treat or prevent imminent or life-threatening deterioration of the following conditions:  CNS failure or compromise    Critical care was time spent personally by me on the following activities:  Obtaining history from patient or surrogate, development of treatment plan with patient or surrogate, discussions with consultants, evaluation of patient's response to treatment, examination of patient, review of old charts, re-evaluation of patient's condition, ordering and review of radiographic studies, ordering and review of laboratory studies and ordering and performing treatments and interventions             ED Course    Stroke alert called upon arrival to ER - discussion via phone with neuro Ewelina Price - bilateral visual field deficit less likely stroke - possibly sella turcica or pituitary abnormality - recommends stat MRI - reviewed with radiologist on call via phone and got approval for MRI        ED Course as of 03/23/23 1211   Wed Mar 22, 2023   1815 Now patient is correcting me - she states that the visual disturbance was only the left side of her visual field  I reviewed with kenna - her vision has improved now - I repeated the exam as he instructed and she didn't have any deficit  no relief of headache in ER - patient declines toradol as she is concerned with her renal cysts, no relief with reglan and benadryl - hydralazine brought BP down but still has headache - discussed with hospitalist in person and he will try a dose of fioricet             Stroke Assessment     Row Name 03/22/23 1607             NIH Stroke Scale    Interval Baseline      Level of Consciousness (1a ) 0      LOC Questions (1b ) 0      LOC Commands (1c ) 0      Best Gaze (2 ) 0      Visual (3 ) 0  decreased temporal vision bilateral      Facial Palsy (4 ) 0      Motor Arm, Left (5a ) 0      Motor Arm, Right (5b ) 0      Motor Leg, Left (6a ) 0      Motor Leg, Right (6b ) 0      Limb Ataxia (7 ) 0      Sensory (8 ) 0      Best Language (9 ) 0      Dysarthria (10 ) 0      Extinction and Inattention (11 ) (Formerly Neglect) 0      Total 0              Flowsheet Row Most Recent Value   Thrombolytic Decision Options    Thrombolytic Decision Patient not a candidate  Patient is not a candidate options Symptoms resolved/clearly non disabling  Medical Decision Making  Differential included acute stroke, hypertensive urgency, temporal arteritis, central lesion causing visual field deficit, and acute glaucoma, atypical migraine    Amount and/or Complexity of Data Reviewed  Labs: ordered  Radiology: ordered  Risk  Prescription drug management  Decision regarding hospitalization            Disposition  Final diagnoses:   Visual disturbance   Headache   Hypertensive urgency     Time reflects when diagnosis was documented in both MDM as applicable and the Disposition within this note     Time User Action Codes Description Comment    3/22/2023  3:57 PM Tomás Duval [H53 9] Visual disturbance     3/22/2023  6:36 PM Blaise Alejo [R51 9] Headache     3/22/2023  6:37 PM Isha Shoulder Add [I16 0] Hypertensive urgency     3/22/2023  7:20 PM Isha Shoulder Modify [H53 9] Visual disturbance     3/22/2023  7:20 PM Isha Shoulder Modify [I16 0] Hypertensive urgency       ED Disposition     ED Disposition   Admit    Condition   Stable    Date/Time   Wed Mar 22, 2023  7:20 PM    Comment   Case was discussed with Demetria Carr* and the patient's admission status was agreed to be Admission Status: observation status to the service of Dr Edwin Venegas**   Follow-up Information    None         Current Discharge Medication List      CONTINUE these medications which have NOT CHANGED    Details   aspirin 81 mg chewable tablet Chew 81 mg every other day       cholecalciferol (VITAMIN D3) 1,000 units tablet Take 2,000 Units by mouth daily      losartan (COZAAR) 50 mg tablet TAKE 1 TABLET BY MOUTH EVERY DAY  Qty: 90 tablet, Refills: 1    Associated Diagnoses: Essential hypertension      metFORMIN (GLUCOPHAGE) 1000 MG tablet TAKE 1 TABLET BY MOUTH TWICE A DAY WITH MEALS  Qty: 180 tablet, Refills: 3    Associated Diagnoses: Type 2 diabetes mellitus without complication, without long-term current use of insulin (HCC)      pantoprazole (PROTONIX) 40 mg tablet TAKE 1 TABLET BY MOUTH EVERY DAY  Qty: 90 tablet, Refills: 5    Associated Diagnoses: Esophagitis      simvastatin (ZOCOR) 40 mg tablet Take 40 mg by mouth every evening      dicyclomine (BENTYL) 10 mg capsule TAKE 1 CAPSULE BY MOUTH 2 TIMES A DAY  Qty: 180 capsule, Refills: 2    Associated Diagnoses: Diarrhea, unspecified type      escitalopram (LEXAPRO) 10 mg tablet Take 10 mg by mouth daily      halobetasol (ULTRAVATE) 0 05 % ointment Apply daily  Qty: 1 Tube, Refills: 0    Associated Diagnoses: Eczema, unspecified type             No discharge procedures on file      PDMP Review     None          ED Provider  Electronically Signed by           Smita Haywood DO  03/23/23 3972

## 2023-03-22 NOTE — ASSESSMENT & PLAN NOTE
Presented with sudden onset headache with associated left temporal vision loss  Attempted Tylenol without relief  · Vital signs stable-patient is hypertensive, although also reports headache  · On exam, no neuro deficits on my exam  Vision disturbance has resolved  W/ R sided shoulder weakness from rotator cuff surgery, currently undergoing PT  · Stroke alert in the ED  · CT head-no acute intracranial normality  · CTA head/neck -  no LVO or other abnormality  · Chart & imaging reviewed by Neuro, low suspicion for CVA however was reported to have bilateral temporal vision loss  She is now reporting only left eye temporal loss (now resolved), which is more concerning for CVA/amaurosis fugax  · Risk factors for CVA include: HTN, HLD, T2DM, DESTINEY, family hx of strokes in 3 siblings  · Eye pressures were checked: R eye - 13  L eye - 14    · Admit under modified stroke pathway:  · MRI brain as ordered per neuro  · Check lipid panel and A1c  · Continue ASA 81mg daily  · Does not require permissive HTN at this point  · Will give fioricet x 1 for headache and reevaluate BP once pain is controlled  · Telemetry  · Does not need PT/OT/ST consult  · Neuro consult  Appreciate recs

## 2023-03-22 NOTE — ASSESSMENT & PLAN NOTE
Continue losartan 50mg daily  Did not miss any doses    Currently hypertensive, although also with significant headache  Treat headache, recheck BP

## 2023-03-22 NOTE — QUICK NOTE
Stroke alert note:    Stroke alert at: 15:58 activated by Dr Batista ([facility] ED)   Neurology response: Immediate / 15:58    75 yr old F with 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 on CTA head in 2021), HTN, HLD, DM2, DESTINEY on CPAP, thyroid cancer, on ASA 81mg daily and statin  Last normal last night 12:30am  Woke up 8:30am today with  visual disturbance described to me as bitemporal / peripherally visual field obscuration with R sided headache  NOT a hemifield visual disturbance and no other neurologic deficits noted on exam  (Per ED physician not even clear if visual field deficit, but patient's  was insistent that patient could not see in peripheral, and stroke alert activated ) FS glucose 198  /76  Does not have eye redness or eye pain  IV thrombolysis candidate: No  Out of time window  Sx already present 7 5 hrs ago  LKW time about 15 5 hrs ago  Minimal deficits  Mechanical thrombectomy candidate: No  No proximal LVO  Minimal deficits  NIHSS score: 1 (confirmed with ED physician)  I discussed imaging with radiology as well  In particular, I verified with them that no LVO, no intracranial aneurysm, no sellar lesion noted (bitemporal hemianopia would typically localize there) and no PCA occlusions noted  ----------------------------  DDx of bitemporal subtle visual loss + HA includes sellar lesion, PRES / hypertensive urgency, b/l PCA strokes or acute glaucoma, GCA  REC:  - Neurochecks with vital signs    - SBP goal normotension as deficits not in distribution of typical cerebrovascular lesion; no need for permissive HTN  - Continue home ASA and statin for now  - Bitemporal visual field deficits are not typically result of cerebrovascular event   Can check MRI brain +Gd + pituitary protocol to r/o sellar lesion   E g , pituitary apoplexy can present with HA and acute bitemporal visual deficits, though HA with that etiology is typically very severe (and may see acute blood on NCHCT)  Her blood pressure is elevated and PRES could also cause b/l visual field obscurations and headaches  - Please also check IOPs and ocular ultrasound in the ED  Multiple intraocular pathologies like glaucoma can present with peripheral visual field loss b/l   - ED also checking ESR and CRP  - Further recommendations pending results of studies above  - D/w Dr Felice Juares of the ED    Jenell Sandhoff     ====================    PMHx:   Autosomal dominant polycystic kidney disease diagnosed in her 45s  S/p multiple drainage procedures for renal cysts (at 44 [facility])  (No evidence of intracranial aneurysm on CTA head 8/3/21 )  DM2  HLD   HTN  Depression  GERD  DESTINEY  Hepatic steatosis  Breast cysts  Hx of UTI  Thyroid nodules s/p ultrasound guided biopsy 9/2021 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  RUL pulmonary nodule  Seen in [facility] ED on 8/3/21 for dizziness worsened with positional changes  Hemorrhoids s/p banding  Prior imaging studies:     8/3/21 CTA head and neck: Imp:   1  No evidence of acute intracranial hemorrhage  2  No evidence of hemodynamic significant stenosis, aneurysm or dissection  3  Approximately 2 cm left thyroid nodule  Incidental discovery of one or more thyroid nodule(s) measuring more than 1 5 cm and without suspicious features is noted in this patient who is above 28years old; according to guidelines published in the February 2015 white paper on incidental thyroid nodules in the Journal of the [employer] of Radiology Yari Cunningham), further characterization with thyroid ultrasound is recommended

## 2023-03-22 NOTE — H&P
[place]  H&P- Nancy Herb 1955, 75 y o  female MRN: [id number]  Unit/Bed#: ED 08 Encounter: [id number]  Primary Care Provider: Ariela Ribeiro MD   Date and time admitted to hospital: 3/22/2023  3:46 PM    * Visual disturbance  Assessment & Plan  Presented with sudden onset headache with associated left temporal vision loss  Attempted Tylenol without relief  · Vital signs stable-patient is hypertensive, although also reports headache  · On exam, no neuro deficits on my exam  Vision disturbance has resolved  W/ R sided shoulder weakness from rotator cuff surgery, currently undergoing PT  · Stroke alert in the ED  · CT head-no acute intracranial normality  · CTA head/neck -  no LVO or other abnormality  · Chart & imaging reviewed by Neuro, low suspicion for CVA however was reported to have bilateral temporal vision loss  She is now reporting only left eye temporal loss (now resolved), which is more concerning for CVA/amaurosis fugax  · Risk factors for CVA include: HTN, HLD, T2DM, DESTINEY, family hx of strokes in 3 siblings  · Eye pressures were checked: R eye - 13  L eye - 14    · Admit under modified stroke pathway:  · MRI brain as ordered per neuro  · Check lipid panel and A1c  · Continue ASA 81mg daily  · Does not require permissive HTN at this point  · Will give fioricet x 1 for headache and reevaluate BP once pain is controlled  · Telemetry  · Does not need PT/OT/ST consult  · Neuro consult  Appreciate recs      DESTINEY on CPAP  Assessment & Plan  Pt's family will bring CPAP from home as she requires the nasal pillows and does not tolerate full mask    Type 2 diabetes mellitus without complication, without long-term current use of insulin [facility])  Assessment & Plan  Lab Results   Component Value Date    HGBA1C 7 0 (H) 09/22/2021       Recent Labs     03/22/23  1600   POCGLU 148*       Blood Sugar Average: Last 72 hrs:  (P) 148   Home regimen: Metformin 1000 mg twice daily  Hold oral antihyperglycemic  Start sliding scale insulin  Diabetic diet    Hyperlipidemia  Assessment & Plan  Continue simvastatin  Obtain Lipid panel    Essential hypertension  Assessment & Plan  Continue losartan 50mg daily  Did not miss any doses  Currently hypertensive, although also with significant headache  Treat headache, recheck BP    VTE Pharmacologic Prophylaxis: VTE Score: 3 Moderate Risk (Score 3-4) - Pharmacological DVT Prophylaxis Ordered: Overnight stay  Code Status: No Order Level 1 full code  Discussion with family: Updated  () at bedside  Anticipated Length of Stay: Patient will be admitted on an observation basis with an anticipated length of stay of less than 2 midnights secondary to CVA r/o  Total Time Spent on Date of Encounter in care of patient: 55 minutes This time was spent on one or more of the following: performing physical exam; counseling and coordination of care; obtaining or reviewing history; documenting in the medical record; reviewing/ordering tests, medications or procedures; communicating with other healthcare professionals and discussing with patient's family/caregivers  Chief Complaint: Vision distrubance + Headache    History of Present Illness:  Alissa Simons is a 75 y o  female with a PMH of HTN, HLD, T2DM, sleep apnea who presents with severe headache and visual disturbances  She reports waking up feeling well, although recovering from the virus  She then developed sudden onset severe headache which started in her right temporal and radiated down to her neck as well as left-sided temporal vision loss which was confirmed by  and ED  On arrival, her BP was elevated  Rest of vital signs are stable  She was initially a stroke alert  CT head was negative for acute intracranial abnormalities  CTA head/neck was negative for LVO, dissection, aneurysms  She will be admitted under stroke pathway      Review of Systems:  Review of Systems   Constitutional: Negative for appetite change, chills, fatigue and fever  HENT: Negative for ear pain, sore throat and trouble swallowing  Headache, started at R temporal, radiated to cervical spine   Eyes: Positive for visual disturbance ( Left temporal hemianopsia)  Respiratory: Negative for cough, chest tightness, shortness of breath and wheezing  Cardiovascular: Negative for chest pain, palpitations and leg swelling  Gastrointestinal: Negative for abdominal distention, abdominal pain, diarrhea, nausea and vomiting  Endocrine: Negative  Genitourinary: Negative for dysuria, flank pain and hematuria  Musculoskeletal: Negative for arthralgias, gait problem and myalgias  Skin: Negative for pallor  Allergic/Immunologic: Negative for immunocompromised state  Neurological: Negative for dizziness, syncope, light-headedness, numbness and headaches  Past Medical and Surgical History:   Past Medical History:   Diagnosis Date   • Arthritis    • 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  Authorizing Provider   aspirin 81 mg chewable tablet Chew 81 mg every other day    Yes Historical Provider, MD   cholecalciferol (VITAMIN D3) 1,000 units tablet Take 2,000 Units by mouth daily   Yes Historical Provider, MD   losartan (COZAAR) 50 mg tablet TAKE 1 TABLET BY MOUTH EVERY DAY 21  Yes Juana Said, DO   metFORMIN (GLUCOPHAGE) 1000 MG tablet TAKE 1 TABLET BY MOUTH TWICE A DAY WITH MEALS 21  Yes Juana Said, DO   pantoprazole (PROTONIX) 40 mg tablet TAKE 1 TABLET BY MOUTH EVERY DAY 21  Yes Lacho Hui MD   simvastatin (ZOCOR) 40 mg tablet Take 40 mg by mouth every evening 20  Yes Historical Provider, MD   dicyclomine (BENTYL) 10 mg capsule TAKE 1 CAPSULE BY MOUTH 2 TIMES A DAY  10/15/22   ABIGAIL [place]   escitalopram (LEXAPRO) 10 mg tablet Take 10 mg by mouth daily    Historical Provider, MD   halobetasol (ULTRAVATE) 0 05 % ointment Apply daily  Patient not taking: Reported on 2021   Chester Couch,      I have reviewed home medications with patient personally  Allergies:    Allergies   Allergen Reactions   • Demerol [Meperidine] GI Intolerance     Other reaction(s): N/V   • Morphine GI Intolerance     Other reaction(s): N/V   • Meloxicam Swelling       Social History:  Marital Status: /Civil Union   Occupation: Noncontributory  Patient Pre-hospital Living Situation: Home  Patient Pre-hospital Level of Mobility: walks  Patient Pre-hospital Diet Restrictions: Diabetic  Substance Use History:   Social History     Substance and Sexual Activity   Alcohol Use No     Social History     Tobacco Use   Smoking Status Former   • Packs/day: 0 50   • Types: Cigarettes   • Quit date: 10/29/1981   • Years since quittin 4   Smokeless Tobacco Never     Social History     Substance and Sexual Activity   Drug Use No       Family History:  Family History   Problem Relation Age of Onset   • COPD Mother    • Lung cancer Mother    • Lung cancer Father    • Hypertension Father    • No Known Problems Sister    • Hypertension Brother    • Colon polyps Brother    • Kidney disease Brother    • Colon polyps Brother    • Stroke Brother    • Kidney disease Brother    • Hypertension Brother    • Hyperlipidemia Brother    • Gestational diabetes Sister    • Colon cancer Maternal Aunt        Physical Exam:     Vitals:   Blood Pressure: 168/72 (23)  Pulse: 85 (23)  Temperature: 99 3 °F (37 4 °C) (03/22/23 1546)  Temp Source: Temporal (03/22/23 1546)  Respirations: 21 (03/22/23 2000)  Weight - Scale: 96 kg (211 lb 10 3 oz) (03/22/23 1607)  SpO2: 94 % (03/22/23 2000)    Physical Exam  Vitals and nursing note reviewed  Constitutional:       Appearance: Normal appearance  HENT:      Head: Normocephalic and atraumatic  Mouth/Throat:      Mouth: Mucous membranes are moist       Pharynx: Oropharynx is clear  No oropharyngeal exudate  Eyes:      Extraocular Movements: Extraocular movements intact  Cardiovascular:      Rate and Rhythm: Normal rate and regular rhythm  Pulses: Normal pulses  Heart sounds: Normal heart sounds  No murmur heard  No friction rub  No gallop  Pulmonary:      Effort: Pulmonary effort is normal  No respiratory distress  Breath sounds: Normal breath sounds  No stridor  No wheezing or rales  Abdominal:      General: Abdomen is flat  Bowel sounds are normal  There is no distension  Palpations: Abdomen is soft  Tenderness: There is no abdominal tenderness  Musculoskeletal:      Right lower leg: No edema  Left lower leg: No edema  Skin:     General: Skin is warm and dry  Neurological:      General: No focal deficit present  Mental Status: She is alert and oriented to person, place, and time            Additional Data:     Lab Results:  Results from last 7 days   Lab Units 03/22/23  1603   WBC Thousand/uL 7 41   HEMOGLOBIN g/dL 11 8   HEMATOCRIT % 37 5   PLATELETS Thousands/uL 210     Results from last 7 days   Lab Units 03/22/23  1603   SODIUM mmol/L 139   POTASSIUM mmol/L 3 9   CHLORIDE mmol/L 103   CO2 mmol/L 27   BUN mg/dL 15   CREATININE mg/dL 0 66   ANION GAP mmol/L 9   CALCIUM mg/dL 10 5*   GLUCOSE RANDOM mg/dL 159*     Results from last 7 days   Lab Units 03/22/23  1603   INR  0 91     Results from last 7 days   Lab Units 03/22/23  1600   POC GLUCOSE mg/dl 148*               Lines/Drains:  Invasive Devices     Peripheral Intravenous Line  Duration           Peripheral IV 03/22/23 Right Antecubital <1 day                    Imaging: Reviewed radiology reports from this admission including: CT head  CTA stroke alert (head/neck)   Final Result by Anya Davenport MD (03/22 1630)      No significant stenosis of the cervical carotid or vertebral arteries  No significant intracranial stenosis, large vessel occlusion or aneurysm  Findings were directly discussed with Kenia Motta at 4:21 PM                            Workstation performed: [id number]         CT stroke alert brain   Final Result by Anya Davenport MD (03/22 1631)      No acute intracranial abnormality  Mild chronic microangiopathy  Findings were directly discussed with Kenia Motta at 4:21 PM       Workstation performed: [id number]             EKG and Other Studies Reviewed on Admission:   · EKG: NSR  HR 82     ** Please Note: This note has been constructed using a voice recognition system   **

## 2023-03-22 NOTE — ASSESSMENT & PLAN NOTE
Lab Results   Component Value Date    HGBA1C 7 0 (H) 09/22/2021       Recent Labs     03/22/23  1600   POCGLU 148*       Blood Sugar Average: Last 72 hrs:  (P) 148   Home regimen: Metformin 1000 mg twice daily  Hold oral antihyperglycemic  Start sliding scale insulin  Diabetic diet

## 2023-03-22 NOTE — STROKE DOCUMENTATION
Pt ambulated to restroom  Denies dizziness  Steady, independent gait  Visual acuity : Right eye 2/20 with glasses, Left eye 20/20 with glasses

## 2023-03-22 NOTE — ASSESSMENT & PLAN NOTE
Pt's family will bring CPAP from home as she requires the nasal pillows and does not tolerate full mask

## 2023-03-23 ENCOUNTER — APPOINTMENT (OUTPATIENT)
Dept: NON INVASIVE DIAGNOSTICS | Facility: HOSPITAL | Age: 68
End: 2023-03-23

## 2023-03-23 ENCOUNTER — APPOINTMENT (OUTPATIENT)
Dept: NON INVASIVE DIAGNOSTICS | Facility: HOSPITAL | Age: 68
End: 2023-03-23
Attending: STUDENT IN AN ORGANIZED HEALTH CARE EDUCATION/TRAINING PROGRAM

## 2023-03-23 VITALS
HEIGHT: 65 IN | BODY MASS INDEX: 35.16 KG/M2 | HEART RATE: 71 BPM | DIASTOLIC BLOOD PRESSURE: 59 MMHG | WEIGHT: 211 LBS | SYSTOLIC BLOOD PRESSURE: 124 MMHG | RESPIRATION RATE: 21 BRPM | TEMPERATURE: 97.8 F | OXYGEN SATURATION: 95 %

## 2023-03-23 LAB
ANION GAP SERPL CALCULATED.3IONS-SCNC: 4 MMOL/L (ref 4–13)
AORTIC ROOT: 3 CM
APICAL FOUR CHAMBER EJECTION FRACTION: 64 %
ASCENDING AORTA: 3 CM
BASOPHILS # BLD AUTO: 0.04 THOUSANDS/ÂΜL (ref 0–0.1)
BASOPHILS NFR BLD AUTO: 1 % (ref 0–1)
BUN SERPL-MCNC: 14 MG/DL (ref 5–25)
CALCIUM SERPL-MCNC: 9.5 MG/DL (ref 8.4–10.2)
CHLORIDE SERPL-SCNC: 106 MMOL/L (ref 96–108)
CHOLEST SERPL-MCNC: 105 MG/DL
CO2 SERPL-SCNC: 28 MMOL/L (ref 21–32)
CREAT SERPL-MCNC: 0.67 MG/DL (ref 0.6–1.3)
DOP CALC LVOT AREA: 3.14 CM2
DOP CALC LVOT DIAMETER: 2 CM
E WAVE DECELERATION TIME: 231 MS
EOSINOPHIL # BLD AUTO: 0.14 THOUSAND/ÂΜL (ref 0–0.61)
EOSINOPHIL NFR BLD AUTO: 2 % (ref 0–6)
ERYTHROCYTE [DISTWIDTH] IN BLOOD BY AUTOMATED COUNT: 12.6 % (ref 11.6–15.1)
FRACTIONAL SHORTENING: 38 % (ref 28–44)
GFR SERPL CREATININE-BSD FRML MDRD: 90 ML/MIN/1.73SQ M
GLUCOSE SERPL-MCNC: 129 MG/DL (ref 65–140)
GLUCOSE SERPL-MCNC: 129 MG/DL (ref 65–140)
GLUCOSE SERPL-MCNC: 171 MG/DL (ref 65–140)
HCT VFR BLD AUTO: 34.2 % (ref 34.8–46.1)
HDLC SERPL-MCNC: 36 MG/DL
HGB BLD-MCNC: 10.4 G/DL (ref 11.5–15.4)
IMM GRANULOCYTES # BLD AUTO: 0.02 THOUSAND/UL (ref 0–0.2)
IMM GRANULOCYTES NFR BLD AUTO: 0 % (ref 0–2)
INTERVENTRICULAR SEPTUM IN DIASTOLE (PARASTERNAL SHORT AXIS VIEW): 0.8 CM
INTERVENTRICULAR SEPTUM: 0.8 CM (ref 0.6–1.1)
LAAS-AP2: 24.2 CM2
LAAS-AP4: 17.2 CM2
LDLC SERPL CALC-MCNC: 52 MG/DL (ref 0–100)
LEFT ATRIUM SIZE: 3.6 CM
LEFT INTERNAL DIMENSION IN SYSTOLE: 2.6 CM (ref 2.1–4)
LEFT VENTRICLE DIASTOLIC VOLUME (MOD BIPLANE): 116 ML
LEFT VENTRICLE SYSTOLIC VOLUME (MOD BIPLANE): 43 ML
LEFT VENTRICULAR INTERNAL DIMENSION IN DIASTOLE: 4.2 CM (ref 3.5–6)
LEFT VENTRICULAR POSTERIOR WALL IN END DIASTOLE: 0.9 CM
LEFT VENTRICULAR STROKE VOLUME: 53 ML
LV EF: 63 %
LVSV (TEICH): 53 ML
LYMPHOCYTES # BLD AUTO: 2.01 THOUSANDS/ÂΜL (ref 0.6–4.47)
LYMPHOCYTES NFR BLD AUTO: 33 % (ref 14–44)
MAGNESIUM SERPL-MCNC: 1.8 MG/DL (ref 1.9–2.7)
MCH RBC QN AUTO: 28 PG (ref 26.8–34.3)
MCHC RBC AUTO-ENTMCNC: 30.4 G/DL (ref 31.4–37.4)
MCV RBC AUTO: 92 FL (ref 82–98)
MONOCYTES # BLD AUTO: 0.48 THOUSAND/ÂΜL (ref 0.17–1.22)
MONOCYTES NFR BLD AUTO: 8 % (ref 4–12)
MV E'TISSUE VEL-SEP: 10 CM/S
MV PEAK A VEL: 1.19 M/S
MV PEAK E VEL: 90 CM/S
MV STENOSIS PRESSURE HALF TIME: 67 MS
MV VALVE AREA P 1/2 METHOD: 3.28 CM2
NEUTROPHILS # BLD AUTO: 3.34 THOUSANDS/ÂΜL (ref 1.85–7.62)
NEUTS SEG NFR BLD AUTO: 56 % (ref 43–75)
NRBC BLD AUTO-RTO: 0 /100 WBCS
PLATELET # BLD AUTO: 198 THOUSANDS/UL (ref 149–390)
PMV BLD AUTO: 9.4 FL (ref 8.9–12.7)
POTASSIUM SERPL-SCNC: 3.8 MMOL/L (ref 3.5–5.3)
RBC # BLD AUTO: 3.71 MILLION/UL (ref 3.81–5.12)
RIGHT ATRIUM AREA SYSTOLE A4C: 16.2 CM2
RIGHT VENTRICLE ID DIMENSION: 2.7 CM
SL CV LEFT ATRIUM LENGTH A2C: 6.3 CM
SL CV LV EF: 65
SL CV PED ECHO LEFT VENTRICLE DIASTOLIC VOLUME (MOD BIPLANE) 2D: 79 ML
SL CV PED ECHO LEFT VENTRICLE SYSTOLIC VOLUME (MOD BIPLANE) 2D: 26 ML
SODIUM SERPL-SCNC: 138 MMOL/L (ref 135–147)
TRICUSPID ANNULAR PLANE SYSTOLIC EXCURSION: 1.8 CM
TRIGL SERPL-MCNC: 84 MG/DL
WBC # BLD AUTO: 6.03 THOUSAND/UL (ref 4.31–10.16)

## 2023-03-23 RX ORDER — ASPIRIN 81 MG/1
81 TABLET, CHEWABLE ORAL DAILY
Status: DISCONTINUED | OUTPATIENT
Start: 2023-03-23 | End: 2023-03-23 | Stop reason: HOSPADM

## 2023-03-23 RX ORDER — DIPHENHYDRAMINE HYDROCHLORIDE 50 MG/ML
25 INJECTION INTRAMUSCULAR; INTRAVENOUS ONCE
Status: COMPLETED | OUTPATIENT
Start: 2023-03-23 | End: 2023-03-23

## 2023-03-23 RX ORDER — ASPIRIN 81 MG/1
81 TABLET, CHEWABLE ORAL DAILY
Qty: 30 TABLET | Refills: 0 | Status: SHIPPED | OUTPATIENT
Start: 2023-03-24 | End: 2023-04-23

## 2023-03-23 RX ORDER — POTASSIUM CHLORIDE 20 MEQ/1
40 TABLET, EXTENDED RELEASE ORAL ONCE
Status: COMPLETED | OUTPATIENT
Start: 2023-03-23 | End: 2023-03-23

## 2023-03-23 RX ORDER — MAGNESIUM SULFATE 1 G/100ML
1 INJECTION INTRAVENOUS ONCE
Status: COMPLETED | OUTPATIENT
Start: 2023-03-23 | End: 2023-03-23

## 2023-03-23 RX ORDER — METOCLOPRAMIDE HYDROCHLORIDE 5 MG/ML
10 INJECTION INTRAMUSCULAR; INTRAVENOUS ONCE
Status: COMPLETED | OUTPATIENT
Start: 2023-03-23 | End: 2023-03-23

## 2023-03-23 RX ADMIN — PANTOPRAZOLE SODIUM 40 MG: 40 TABLET, DELAYED RELEASE ORAL at 08:34

## 2023-03-23 RX ADMIN — INSULIN LISPRO 1 UNITS: 100 INJECTION, SOLUTION INTRAVENOUS; SUBCUTANEOUS at 11:31

## 2023-03-23 RX ADMIN — METOCLOPRAMIDE 10 MG: 5 INJECTION, SOLUTION INTRAMUSCULAR; INTRAVENOUS at 03:04

## 2023-03-23 RX ADMIN — DIPHENHYDRAMINE HYDROCHLORIDE 25 MG: 50 INJECTION, SOLUTION INTRAMUSCULAR; INTRAVENOUS at 03:03

## 2023-03-23 RX ADMIN — Medication 2000 UNITS: at 08:34

## 2023-03-23 RX ADMIN — LOSARTAN POTASSIUM 50 MG: 50 TABLET, FILM COATED ORAL at 08:34

## 2023-03-23 RX ADMIN — MAGNESIUM SULFATE IN DEXTROSE 1 G: 10 INJECTION, SOLUTION INTRAVENOUS at 08:31

## 2023-03-23 RX ADMIN — POTASSIUM CHLORIDE 40 MEQ: 1500 TABLET, EXTENDED RELEASE ORAL at 08:33

## 2023-03-23 RX ADMIN — ASPIRIN 81 MG CHEWABLE TABLET 81 MG: 81 TABLET CHEWABLE at 09:07

## 2023-03-23 NOTE — PLAN OF CARE
Problem: PAIN - ADULT  Goal: Verbalizes/displays adequate comfort level or baseline comfort level  Description: Interventions:  - Encourage patient to monitor pain and request assistance  - Assess pain using appropriate pain scale  - Administer analgesics based on type and severity of pain and evaluate response  - Implement non-pharmacological measures as appropriate and evaluate response  - Consider cultural and social influences on pain and pain management  - Notify physician/advanced practitioner if interventions unsuccessful or patient reports new pain  3/23/2023 1439 by Abel Duff RN  Outcome: Adequate for Discharge  3/23/2023 0739 by Abel Duff RN  Outcome: Progressing     Problem: INFECTION - ADULT  Goal: Absence or prevention of progression during hospitalization  Description: INTERVENTIONS:  - Assess and monitor for signs and symptoms of infection  - Monitor lab/diagnostic results  - Monitor all insertion sites, i e  indwelling lines, tubes, and drains  - Monitor endotracheal if appropriate and nasal secretions for changes in amount and color  - [place] appropriate cooling/warming therapies per order  - Administer medications as ordered  - Instruct and encourage patient and family to use good hand hygiene technique  - Identify and instruct in appropriate isolation precautions for identified infection/condition  3/23/2023 1439 by Abel Duff RN  Outcome: Adequate for Discharge  3/23/2023 0739 by Abel Duff RN  Outcome: Progressing  Goal: Absence of fever/infection during neutropenic period  Description: INTERVENTIONS:  - Monitor WBC    3/23/2023 1439 by Abel Duff RN  Outcome: Adequate for Discharge  3/23/2023 0739 by Abel Duff RN  Outcome: Progressing     Problem: SAFETY ADULT  Goal: Patient will remain free of falls  Description: INTERVENTIONS:  - Educate patient/family on patient safety including physical limitations  - Instruct patient to call for assistance with activity   - Consult OT/PT to assist with strengthening/mobility   - Keep Call bell within reach  - Keep bed low and locked with side rails adjusted as appropriate  - Keep care items and personal belongings within reach  - Initiate and maintain comfort rounds  - Make Fall Risk Sign visible to staff  - Offer Toileting every x Hours, in advance of need  - Initiate/Maintain xalarm  - Obtain necessary fall risk management equipment: x  - Apply yellow socks and bracelet for high fall risk patients  - Consider moving patient to room near nurses station  3/23/2023 1439 by Suyapa Neal RN  Outcome: Adequate for Discharge  3/23/2023 0739 by Suyapa Neal RN  Outcome: Progressing  Goal: Maintain or return to baseline ADL function  Description: INTERVENTIONS:  -  Assess patient's ability to carry out ADLs; assess patient's baseline for ADL function and identify physical deficits which impact ability to perform ADLs (bathing, care of mouth/teeth, toileting, grooming, dressing, etc )  - Assess/evaluate cause of self-care deficits   - Assess range of motion  - Assess patient's mobility; develop plan if impaired  - Assess patient's need for assistive devices and provide as appropriate  - Encourage maximum independence but intervene and supervise when necessary  - Involve family in performance of ADLs  - Assess for home care needs following discharge   - Consider OT consult to assist with ADL evaluation and planning for discharge  - Provide patient education as appropriate  3/23/2023 1439 by Suyapa Neal RN  Outcome: Adequate for Discharge  3/23/2023 0739 by Suyapa Neal RN  Outcome: Progressing  Goal: Maintains/Returns to pre admission functional level  Description: INTERVENTIONS:  - Perform BMAT or MOVE assessment daily    - Set and communicate daily mobility goal to care team and patient/family/caregiver     - Collaborate with rehabilitation services on mobility goals if consulted  - Perform Range of Motion x times a day  - Reposition patient every x hours  - Dangle patient x times a day  - Stand patient x times a day  - Ambulate patient x times a day  - Out of bed to chair x times a day   - Out of bed for meals x times a day  - Out of bed for toileting  - Record patient progress and toleration of activity level   3/23/2023 1439 by Alex Bass RN  Outcome: Adequate for Discharge  3/23/2023 0739 by Alex Bass RN  Outcome: Progressing     Problem: DISCHARGE PLANNING  Goal: Discharge to home or other facility with appropriate resources  Description: INTERVENTIONS:  - Identify barriers to discharge w/patient and caregiver  - Arrange for needed discharge resources and transportation as appropriate  - Identify discharge learning needs (meds, wound care, etc )  - Arrange for interpretive services to assist at discharge as needed  - Refer to Case Management Department for coordinating discharge planning if the patient needs post-hospital services based on physician/advanced practitioner order or complex needs related to functional status, cognitive ability, or social support system  3/23/2023 1439 by Alex Bass RN  Outcome: Adequate for Discharge  3/23/2023 0739 by Alex Bass RN  Outcome: Progressing     Problem: Knowledge Deficit  Goal: Patient/family/caregiver demonstrates understanding of disease process, treatment plan, medications, and discharge instructions  Description: Complete learning assessment and assess knowledge base    Interventions:  - Provide teaching at level of understanding  - Provide teaching via preferred learning methods  3/23/2023 1439 by Alex Bass RN  Outcome: Adequate for Discharge  3/23/2023 0739 by Alex Bass RN  Outcome: Progressing     Problem: NEUROSENSORY - ADULT  Goal: Achieves stable or improved neurological status  Description: INTERVENTIONS  - Monitor and report changes in neurological status  - Monitor vital signs such as temperature, blood pressure, glucose, and any other labs ordered   - Initiate measures to prevent increased intracranial pressure  - Monitor for seizure activity and implement precautions if appropriate      3/23/2023 1439 by Roman Parks RN  Outcome: Adequate for Discharge  3/23/2023 2992 by Roman Parks RN  Outcome: Progressing  Goal: Remains free of injury related to seizures activity  Description: INTERVENTIONS  - Maintain airway, patient safety  and administer oxygen as ordered  - Monitor patient for seizure activity, document and report duration and description of seizure to physician/advanced practitioner  - If seizure occurs,  ensure patient safety during seizure  - Reorient patient post seizure  - Seizure pads on all 4 side rails  - Instruct patient/family to notify RN of any seizure activity including if an aura is experienced  - Instruct patient/family to call for assistance with activity based on nursing assessment  - Administer anti-seizure medications if ordered    3/23/2023 1439 by Roman Parks RN  Outcome: Adequate for Discharge  3/23/2023 0739 by Roman Parks RN  Outcome: Progressing  Goal: Achieves maximal functionality and self care  Description: INTERVENTIONS  - Monitor swallowing and airway patency with patient fatigue and changes in neurological status  - Encourage and assist patient to increase activity and self care  - Encourage visually impaired, hearing impaired and aphasic patients to use assistive/communication devices  3/23/2023 1439 by Roman Parks RN  Outcome: Adequate for Discharge  3/23/2023 1606 by Roman Parks RN  Outcome: Progressing     Problem: Neurological Deficit  Goal: Neurological status is stable or improving  Description: Interventions:  - Monitor and assess patient's level of consciousness, motor function, sensory function, and level of assistance needed for ADLs  - Monitor and report changes from baseline   Collaborate with interdisciplinary team to initiate plan and implement interventions as ordered  - Provide and maintain a safe environment  - Consider seizure precautions  - Consider fall precautions  - Consider aspiration precautions  - Consider bleeding precautions  3/23/2023 1439 by Temitope Kaiser RN  Outcome: Adequate for Discharge  3/23/2023 7134 by Temitope Kaiser RN  Outcome: Progressing     Problem: Activity Intolerance/Impaired Mobility  Goal: Mobility/activity is maintained at optimum level for patient  Description: Interventions:  - Assess and monitor patient  barriers to mobility and need for assistive/adaptive devices  - Assess patient's emotional response to limitations  - Collaborate with interdisciplinary team and initiate plans and interventions as ordered  - Encourage independent activity per ability   - Maintain proper body alignment  - Perform active/passive rom as tolerated/ordered  - Plan activities to conserve energy   - Turn patient as appropriate  3/23/2023 1439 by Temitope Kaiser RN  Outcome: Adequate for Discharge  3/23/2023 8897 by Temitope Kaiser RN  Outcome: Progressing     Problem: Communication Impairment  Goal: Ability to express needs and understand communication  Description: Assess patient's communication skills and ability to understand information  Patient will demonstrate use of effective communication techniques, alternative methods of communication and understanding even if not able to speak  - Encourage communication and provide alternate methods of communication as needed  - Collaborate with case management/ for discharge needs  - Include patient/family/caregiver in decisions related to communication    3/23/2023 1439 by Temitope Kaiser RN  Outcome: Adequate for Discharge  3/23/2023 6146 by Temitope Kaiser RN  Outcome: Progressing     Problem: Potential for Aspiration  Goal: Non-ventilated patient's risk of aspiration is minimized  Description: Assess and monitor vital signs, respiratory status, and labs (WBC)  Monitor for signs of aspiration (tachypnea, cough, rales, wheezing, cyanosis, fever)  - Assess and monitor patient's ability to swallow  - Place patient up in chair to eat if possible  - HOB up at 90 degrees to eat if unable to get patient up into chair   - Supervise patient during oral intake  - Instruct patient/ family to take small bites  - Instruct patient/ family to take small single sips when taking liquids  - Follow patient-specific strategies generated by speech pathologist   3/23/2023 1439 by Salvador Matt RN  Outcome: Adequate for Discharge  3/23/2023 0592 by Salvador Matt RN  Outcome: Progressing     Problem: Nutrition  Goal: Nutrition/Hydration status is improving  Description: Monitor and assess patient's nutrition/hydration status for malnutrition (ex- brittle hair, bruises, dry skin, pale skin and conjunctiva, muscle wasting, smooth red tongue, and disorientation)  Collaborate with interdisciplinary team and initiate plan and interventions as ordered  Monitor patient's weight and dietary intake as ordered or per policy  Utilize nutrition screening tool and intervene per policy  Determine patient's food preferences and provide high-protein, high-caloric foods as appropriate  - Assist patient with eating   - Allow adequate time for meals   - Encourage patient to take dietary supplement as ordered  - Collaborate with clinical nutritionist   - Include patient/family/caregiver in decisions related to nutrition    3/23/2023 1439 by Salvador Matt RN  Outcome: Adequate for Discharge  3/23/2023 5965 by Salvador Matt RN  Outcome: Progressing     Problem: CARDIOVASCULAR - ADULT  Goal: Maintains optimal cardiac output and hemodynamic stability  Description: INTERVENTIONS:  - Monitor I/O, vital signs and rhythm  - Monitor for S/S and trends of decreased cardiac output  - Administer and titrate ordered vasoactive medications to optimize hemodynamic stability  - Assess quality of pulses, skin color and temperature  - Assess for signs of decreased coronary artery perfusion  - Instruct patient to report change in severity of symptoms  3/23/2023 1439 by Cruzito Grubbs RN  Outcome: Adequate for Discharge  3/23/2023 8459 by Cruzito Grubbs RN  Outcome: Progressing  Goal: Absence of cardiac dysrhythmias or at baseline rhythm  Description: INTERVENTIONS:  - Continuous cardiac monitoring, vital signs, obtain 12 lead EKG if ordered  - Administer antiarrhythmic and heart rate control medications as ordered  - Monitor electrolytes and administer replacement therapy as ordered  3/23/2023 1439 by Cruzito Grubbs RN  Outcome: Adequate for Discharge  3/23/2023 0739 by Cruzito Grubbs RN  Outcome: Progressing     Problem: METABOLIC, FLUID AND ELECTROLYTES - ADULT  Goal: Electrolytes maintained within normal limits  Description: INTERVENTIONS:  - Monitor labs and assess patient for signs and symptoms of electrolyte imbalances  - Administer electrolyte replacement as ordered  - Monitor response to electrolyte replacements, including repeat lab results as appropriate  - Instruct patient on fluid and nutrition as appropriate  3/23/2023 1439 by Cruzito Grubbs RN  Outcome: Adequate for Discharge  3/23/2023 0739 by Cruzito Grubbs RN  Outcome: Progressing  Goal: Fluid balance maintained  Description: INTERVENTIONS:  - Monitor labs   - Monitor I/O and WT  - Instruct patient on fluid and nutrition as appropriate  - Assess for signs & symptoms of volume excess or deficit  3/23/2023 1439 by Cruzito Grubbs RN  Outcome: Adequate for Discharge  3/23/2023 0739 by Cruzito Grubbs RN  Outcome: Progressing     Problem: SKIN/TISSUE INTEGRITY - ADULT  Goal: Skin Integrity remains intact(Skin Breakdown Prevention)  Description: Assess:  -Perform Javi assessment every x  -Clean and moisturize skin every x  -Inspect skin when repositioning, toileting, and assisting with ADLS  -Assess under medical devices such as x every x  -Assess extremities for adequate circulation and sensation     Bed Management:  -Have minimal linens on bed & keep smooth, unwrinkled  -Change linens as needed when moist or perspiring  -Avoid sitting or lying in one position for more than xxxxx hours while in bed  -Keep HOB at [facility]     Toileting:  -Offer bedside commode  -Assess for incontinence every x  -Use incontinent care products after each incontinent episode such as x    Activity:  -Mobilize patient x times a day  -Encourage activity and walks on unit  -Encourage or provide ROM exercises   -Turn and reposition patient every x Hours  -Use appropriate equipment to lift or move patient in bed  -Instruct/ Assist with weight shifting every x when out of bed in chair  -Consider limitation of chair time x hour intervals    Skin Care:  -Avoid use of baby powder, tape, friction and shearing, hot water or constrictive clothing  -Relieve pressure over bony prominences using x  -Do not massage red bony areas    Next Steps:  -Teach patient strategies to minimize risks such as x   -Consider consults to  interdisciplinary teams such as x  3/23/2023 1439 by Elinor Reddy RN  Outcome: Adequate for Discharge  3/23/2023 0739 by Elinor Reddy RN  Outcome: Progressing     Problem: HEMATOLOGIC - ADULT  Goal: Maintains hematologic stability  Description: INTERVENTIONS  - Assess for signs and symptoms of bleeding or hemorrhage  - Monitor labs  - Administer supportive blood products/factors as ordered and appropriate  3/23/2023 1439 by Elinor Reddy RN  Outcome: Adequate for Discharge  3/23/2023 0739 by Elinor Reddy RN  Outcome: Progressing     Problem: MUSCULOSKELETAL - ADULT  Goal: Maintain or return mobility to safest level of function  Description: INTERVENTIONS:  - Assess patient's ability to carry out ADLs; assess patient's baseline for ADL function and identify physical deficits which impact ability to perform ADLs (bathing, care of mouth/teeth, toileting, grooming, dressing, etc )  - Assess/evaluate cause of self-care deficits   - Assess range of motion  - Assess patient's mobility  - Assess patient's need for assistive devices and provide as appropriate  - Encourage maximum independence but intervene and supervise when necessary  - Involve family in performance of ADLs  - Assess for home care needs following discharge   - Consider OT consult to assist with ADL evaluation and planning for discharge  - Provide patient education as appropriate  3/23/2023 1439 by Alex Bass RN  Outcome: Adequate for Discharge  3/23/2023 0739 by Alex Bass RN  Outcome: Progressing     Problem: Nutrition/Hydration-ADULT  Goal: Nutrient/Hydration intake appropriate for improving, restoring or maintaining nutritional needs  Description: Monitor and assess patient's nutrition/hydration status for malnutrition  Collaborate with interdisciplinary team and initiate plan and interventions as ordered  Monitor patient's weight and dietary intake as ordered or per policy  Utilize nutrition screening tool and intervene as necessary  Determine patient's food preferences and provide high-protein, high-caloric foods as appropriate       INTERVENTIONS:  - Monitor oral intake, urinary output, labs, and treatment plans  - Assess nutrition and hydration status and recommend course of action  - Evaluate amount of meals eaten  - Assist patient with eating if necessary   - Allow adequate time for meals  - Recommend/ encourage appropriate diets, oral nutritional supplements, and vitamin/mineral supplements  - Order, calculate, and assess calorie counts as needed  - Recommend, monitor, and adjust tube feedings and TPN/PPN based on assessed needs  - Assess need for intravenous fluids  - Provide specific nutrition/hydration education as appropriate  - Include patient/family/caregiver in decisions related to nutrition  3/23/2023 1439 by Maria Dolores Roche RN  Outcome: Adequate for Discharge  3/23/2023 1214 by Maria Dolores Roche RN  Outcome: Progressing     Problem: Prexisting or High Potential for Compromised Skin Integrity  Goal: Skin integrity is maintained or improved  Description: INTERVENTIONS:  - Identify patients at risk for skin breakdown  - Assess and monitor skin integrity  - Assess and monitor nutrition and hydration status  - Monitor labs   - Assess for incontinence   - Turn and reposition patient  - Assist with mobility/ambulation  - Relieve pressure over bony prominences  - Avoid friction and shearing  - Provide appropriate hygiene as needed including keeping skin clean and dry  - Evaluate need for skin moisturizer/barrier cream  - Collaborate with interdisciplinary team   - Patient/family teaching  - Consider wound care consult   3/23/2023 1439 by Maria Dolores Roche RN  Outcome: Adequate for Discharge  3/23/2023 0739 by Maria Dolores Roche RN  Outcome: Progressing     Problem: MOBILITY - ADULT  Goal: Maintain or return to baseline ADL function  Description: INTERVENTIONS:  -  Assess patient's ability to carry out ADLs; assess patient's baseline for ADL function and identify physical deficits which impact ability to perform ADLs (bathing, care of mouth/teeth, toileting, grooming, dressing, etc )  - Assess/evaluate cause of self-care deficits   - Assess range of motion  - Assess patient's mobility; develop plan if impaired  - Assess patient's need for assistive devices and provide as appropriate  - Encourage maximum independence but intervene and supervise when necessary  - Involve family in performance of ADLs  - Assess for home care needs following discharge   - Consider OT consult to assist with ADL evaluation and planning for discharge  - Provide patient education as appropriate  3/23/2023 1439 by Maria Dolores Roche RN  Outcome: Adequate for Discharge  3/23/2023 0739 by Lauren Stephens RN  Outcome: Progressing  Goal: Maintains/Returns to pre admission functional level  Description: INTERVENTIONS:  - Perform BMAT or MOVE assessment daily    - Set and communicate daily mobility goal to care team and patient/family/caregiver  - Collaborate with rehabilitation services on mobility goals if consulted  - Perform Range of Motion x times a day  - Reposition patient every x hours    - Dangle patient x times a day  - Stand patient x times a day  - Ambulate patient x times a day  - Out of bed to chair x times a day   - Out of bed for meals x times a day  - Out of bed for toileting  - Record patient progress and toleration of activity level   3/23/2023 1439 by Lauren Stephens RN  Outcome: Adequate for Discharge  3/23/2023 9589 by Lauren Stephens RN  Outcome: Progressing     Problem: Potential for Falls  Goal: Patient will remain free of falls  Description: INTERVENTIONS:  - Educate patient/family on patient safety including physical limitations  - Instruct patient to call for assistance with activity   - Consult OT/PT to assist with strengthening/mobility   - Keep Call bell within reach  - Keep bed low and locked with side rails adjusted as appropriate  - Keep care items and personal belongings within reach  - Initiate and maintain comfort rounds  - Make Fall Risk Sign visible to staff  - Offer Toileting every x Hours, in advance of need  - Initiate/Maintain xalarm  - Obtain necessary fall risk management equipment: x  - Apply yellow socks and bracelet for high fall risk patients  - Consider moving patient to room near nurses station  3/23/2023 1439 by Lauren Stephens RN  Outcome: Adequate for Discharge  3/23/2023 0739 by Lauren Stephens RN  Outcome: Progressing

## 2023-03-23 NOTE — ASSESSMENT & PLAN NOTE
Lab Results   Component Value Date    HGBA1C 6 5 (H) 03/22/2023       Recent Labs     03/22/23  1600 03/22/23  2213   POCGLU 148* 168*       Blood Sugar Average: Last 72 hrs:  (P) 158   Home regimen: Metformin 1000 mg twice daily  Hold oral antihyperglycemic  Start sliding scale insulin  Diabetic diet

## 2023-03-23 NOTE — DISCHARGE SUMMARY
[facility]  Discharge- Willard Mejia 1955, 75 y o  female MRN: [id number]  Unit/Bed#: -01 Encounter: [id number]  Primary Care Provider: Richie Lopez MD   Date and time admitted to hospital: 3/22/2023  3:46 PM    * Visual disturbance  Assessment & Plan  Presented with sudden onset headache with associated left temporal vision loss  Attempted Tylenol without relief  · Vital signs stable-patient is hypertensive, although also reports headache  · On exam, no neuro deficits  Vision disturbance has resolved  W/ R sided shoulder weakness from rotator cuff surgery, currently undergoing PT  · Stroke alert in the ED  · CT head-no acute intracranial normality  · CTA head/neck -  no LVO or other abnormality  · Chart & imaging reviewed by Neuro, low suspicion for CVA however was reported to have bilateral temporal vision loss  She is now reporting only left eye temporal loss (now resolved), which is more concerning for CVA/amaurosis fugax  · Risk factors for CVA include: HTN, HLD, T2DM, DESTINEY, family hx of strokes in 3 siblings  · Eye pressures were checked: R eye - 13  L eye - 14    · Admit under modified stroke pathway:  · MRI brain WNL  · lipid panel and A1c WNL  · Continue ASA 81mg daily  · Does not require permissive HTN at this point  · Will give fioricet x 1 for headache and reevaluate BP once pain is controlled  · Telemetry  · Does not need PT/OT/ST consult  · Echo WNL  · Temporal u/s negative for arteritis    · F/u with neuro outpt  · F/u with ophthalmology outpt    DESTINEY on CPAP  Assessment & Plan  Pt's family will bring CPAP from home as she requires the nasal pillows and does not tolerate full mask    Type 2 diabetes mellitus without complication, without long-term current use of insulin [facility])  Assessment & Plan  Lab Results   Component Value Date    HGBA1C 6 5 (H) 03/22/2023       Recent Labs     03/22/23  1600 03/22/23  2213   POCGLU 148* 168*       Blood Sugar Average: Last 72 hrs:  (P) 158   Home regimen: Metformin 1000 mg twice daily  Hold oral antihyperglycemic  Start sliding scale insulin  Diabetic diet    Hyperlipidemia  Assessment & Plan  Continue simvastatin  Lipid panel showing low HDL    Essential hypertension  Assessment & Plan  Continue losartan 50mg daily  Did not miss any doses  HTN resolved  F/u with PCP      Medical Problems     Resolved Problems  Date Reviewed: 3/22/2023   None       Discharging Physician / Practitioner: Hoem Strickland MD  PCP: Dex Braga MD  Admission Date:   Admission Orders (From admission, onward)     Ordered        03/22/23 1921  Place in Observation  Once                      Discharge Date: 03/23/23    Consultations During Hospital Stay:  · Neuro  · CM  ·     Procedures Performed:   MRI brain pituitary wo and w contrast   Final Result         1  Unremarkable exam of the pituitary  No evidence of lesion  2   Mild microangiopathic change  No evidence of acute infarct, intracranial hemorrhage or mass  Workstation performed: [id number]         CTA stroke alert (head/neck)   Final Result      No significant stenosis of the cervical carotid or vertebral arteries  No significant intracranial stenosis, large vessel occlusion or aneurysm  Findings were directly discussed with Zully Bell at 4:21 PM                            Workstation performed: [id number]         CT stroke alert brain   Final Result      No acute intracranial abnormality  Mild chronic microangiopathy  Findings were directly discussed with Zully Bell at 4:21 PM       Workstation performed: [id number]         VAS temporal artery duplex    (Results Pending)   ·   Echo:Left Ventricle: Left ventricular cavity size is normal  Wall thickness is normal  The left ventricular ejection fraction is 65% by biplane measurement  Wall motion is normal  Diastolic function is normal for age     ·     Significant Findings / Test Results: · none    Incidental Findings:   · none   ·     Test Results Pending at Discharge (will require follow up):   · none     Outpatient Tests Requested:  · none    Complications:  None known at this time    Reason for Admission: visual disturbance    Hospital Course: Gaviota Schwartz is a 75 y o  female patient who originally presented to the hospital on 3/22/2023 due to sudden severe onset of headache that was associated with left temporal vision loss  Attempted Tylenol however that did not relieve her symptoms  In the ED stroke alert was called  Patient was hypertensive  Imaging was negative  She was admitted for modified stroke pathway as there was a low suspicion for CVA  Further imaging and tests were negative  Blood pressure had regulated and was within normal limits on day of discharge  She is otherwise remained hemodynamically stable and has been medically cleared for discharge by neurology and internal medicine  She is to follow-up with neurology outpatient as well as ophthalmology  She is also to follow-up with her PCP in 1 to 2 weeks to monitor blood pressure      Please see above list of diagnoses and related plan for additional information  Condition at Discharge: stable    Discharge Day Visit / Exam:   Subjective: Carey Richmond was seen and examined at bedside  No acute events overnight  Discussed plan of care  All questions and concerns were answered and addressed  Vitals: Blood Pressure: 124/59 (03/23/23 1205)  Pulse: 71 (03/23/23 1205)  Temperature: 97 8 °F (36 6 °C) (03/23/23 0731)  Temp Source: Oral (03/22/23 2213)  Respirations: 21 (03/22/23 2000)  Height: 5' 5 25" (165 7 cm) (03/23/23 1205)  Weight - Scale: 95 7 kg (211 lb) (03/23/23 1205)  SpO2: 95 % (03/23/23 0731)  Exam:   Physical Exam  Vitals and nursing note reviewed  Constitutional:       General: She is not in acute distress  Appearance: She is obese  She is not ill-appearing     HENT:      Head: Normocephalic and atraumatic  Cardiovascular:      Rate and Rhythm: Normal rate and regular rhythm  Pulses: Normal pulses  Heart sounds: Normal heart sounds  Pulmonary:      Effort: Pulmonary effort is normal       Breath sounds: Normal breath sounds  Abdominal:      General: Abdomen is flat  Bowel sounds are normal       Palpations: Abdomen is soft  Musculoskeletal:      Right lower leg: No edema  Left lower leg: No edema  Skin:     General: Skin is warm  Neurological:      General: No focal deficit present  Mental Status: She is alert and oriented to person, place, and time  Discussion with Family: Updated  () at bedside  Discharge instructions/Information to patient and family:   See after visit summary for information provided to patient and family  Provisions for Follow-Up Care:  See after visit summary for information related to follow-up care and any pertinent home health orders  Disposition:   Home    Planned Readmission: no     Discharge Statement:  I spent 35 minutes discharging the patient  This time was spent on the day of discharge  I had direct contact with the patient on the day of discharge  Greater than 50% of the total time was spent examining patient, answering all patient questions, arranging and discussing plan of care with patient as well as directly providing post-discharge instructions  Additional time then spent on discharge activities  Discharge Medications:  See after visit summary for reconciled discharge medications provided to patient and/or family        **Please Note: This note may have been constructed using a voice recognition system**

## 2023-03-23 NOTE — ASSESSMENT & PLAN NOTE
75year old female with history of autosomal dominant polycystic kidney disease, HTN, HLD, DM, DESTINEY  Presented as stroke alert on 3/22 with vision obscuration (initially reported as bitemporal vision loss, but patient does endorse that it was L peripheral visual field loss) and headache, was deemed not a thrombolytic nor endovascular candidate  Reportedly during admission last night, vision disturbance had resolved  Neuro exam non-focal this AM; L visual field appears intact  Headache not fully resolved, but improved this AM      MRI brain with pituitary protocol yesterday did not reveal any structural pathology (no clear evidence of infarct, PRES, sellar lesion, nor pituitary pathology)  Also undergoing work-up for alternative origins such as GCA, primary ophthalmologic source       Plan:  -stroke workup was initiated:  -CT head during alert without acute intracranial pathology  -CTA head/neck negative for flow restrictive disease/significant stenosis, no LVO amendable to endovascular intervention  -MRI brain with pituitary protocol overnight was negative for acute intracranial pathology nor with pituitary related pathology  -will obtain 2D echo  -inflammatory markers   -ESR 41, CRP 11 7  -IOP's checked   -R eye 13, L eye 14  -temporal artery duplex pending   -if abnormal, consider vascular surgery consult for possible biopsy (although low suspicion for GCA at this point)  -recommend formal outpatient ophthalmology evaluation following discharge   -if evidence of BRAO on formal ophtho evaluation; would refer to cardiology for further embolic workup (loop monitor/Ziopatch, etc )  -on aspirin at home PTA, continuing  -on statin at home PTA, continuing  -hemoglobin A1C of 6 5, lipid panel with LDL of 52  -neuro checks  -telemetry monitoring  -stroke education provided  -no need for PT/OT/ST as neuro exam intact, MRI brain negative for acute findings and ambulating independently without difficulty  -CM actively following with discharge planning

## 2023-03-23 NOTE — CONSULTS
Consultation - Neurology   Alissa Simons 75 y o  female MRN: [id number]  Unit/Bed#: -01 Encounter: [id number]      Assessment/Plan   * Visual disturbance  Assessment & Plan  75year old female with history of autosomal dominant polycystic kidney disease, HTN, HLD, DM, DESTINEY  Presented as stroke alert on 3/22 with vision obscuration (initially reported as bitemporal vision loss, but patient does endorse that it was L peripheral visual field loss) and headache, was deemed not a thrombolytic nor endovascular candidate  Reportedly during admission last night, vision disturbance had resolved  Neuro exam non-focal this AM; L visual field appears intact  Headache not fully resolved, but improved this AM      MRI brain with pituitary protocol yesterday did not reveal any structural pathology (no clear evidence of infarct, PRES, sellar lesion, nor pituitary pathology)  Also undergoing work-up for alternative origins such as GCA, primary ophthalmologic source       Plan:  -stroke workup was initiated:  -CT head during alert without acute intracranial pathology  -CTA head/neck negative for flow restrictive disease/significant stenosis, no LVO amendable to endovascular intervention  -MRI brain with pituitary protocol overnight was negative for acute intracranial pathology nor with pituitary related pathology  -will obtain 2D echo  -inflammatory markers   -ESR 41, CRP 11 7  -IOP's checked   -R eye 13, L eye 14  -temporal artery duplex pending   -if abnormal, consider vascular surgery consult for possible biopsy (although low suspicion for GCA at this point)  -recommend formal outpatient ophthalmology evaluation following discharge   -if evidence of BRAO on formal ophtho evaluation; would refer to cardiology for further embolic workup (loop monitor/Ziopatch, etc )  -on aspirin at home PTA, continuing  -on statin at home PTA, continuing  -hemoglobin A1C of 6 5, lipid panel with LDL of 52  -neuro checks  -telemetry monitoring  -stroke education provided  -no need for PT/OT/ST as neuro exam intact, MRI brain negative for acute findings and ambulating independently without difficulty  -CM actively following with discharge planning      Type 2 diabetes mellitus without complication, without long-term current use of insulin [facility])  Assessment & Plan  Lab Results   Component Value Date    HGBA1C 6 5 (H) 03/22/2023       Recent Labs     03/22/23  1600 03/22/23  2213   POCGLU 148* 168*       Blood Sugar Average: Last 72 hrs:  (P) 158     -euglycemic goals, management per primary team    Hyperlipidemia  Assessment & Plan  -lipid panel reviewed  -continue statin    Essential hypertension  Assessment & Plan  -can pursue normotension from neuro standpoint    Provided temporal artery duplex unrevealing and echo completed, no further inpatient neurologic workup anticipated  Discussed plan of care with attending neurologist      Alexis Head will need follow up in in 6 weeks with headache attending or advance practitioner  She will not require outpatient neurological testing  History of Present Illness     Reason for Consult / Principal Problem: Status post stroke alert, vision disturbance, atypical headache  HPI: Alexis Head is a 75 y o  female with history as mentioned above who neurology is asked to evaluate in regards to the above  Please see Dr Mandi Leger stroke alert note for more details yesterday afternoon  Was deemed not a TNK nor endovascular candidate  Was admitted with neurodiagnostics as mentioned above  Was hypertensive on presentation at 173/74  In further discussion with patient and  this morning: noted a mild R temporal headache with radiation to the back of her head; started mild but increased in severity throughout the late morning/early afternoon   In addition to this, patient noticed while playing cards with her , that she could not see the Left-most card that she was holding in her hands  This concerned the patient and , thus she came to the ED for evaluation where stroke alert was activated (see above in HPI)  Patient again endorses it was particularly her L field of vision that was affected; otherwise she denies any other focal symptoms yesterday (denies speech changes, diplopia, blurry vision, focal motor/sensory deficit in the extremities, dysphagia, gait instability)  No history of headache nor migraine  No eye pain yesterday, nor other bulbar symptoms  Per H&P, visual field cut had resolved at time of admission last night  Today headache is mild but improved (3/10), was 9/10 at its worst yesterday  Received medications last night/overnight  No history of headaches/migraines    Typical SBP is in the 130's per patient, compliant with anti-HTN and DM meds at home as well as aspirin/statin  Former smoker, quit 40+ years ago  No personal history of cancer nor autoimmune disease  Patient and  were suffering from cold symptoms for past few weeks (cough, post nasal drip)  Tested for COVID which was negative  Consult to Neurology  Consult performed by: Joaquín Dunlap PA-C  Consult ordered by: Myla Brown DO    Inpatient consult to Neurology  Consult performed by: Joaquín Dunlap PA-C  Consult ordered by: Cameron Salcedo PA-C          Review of Systems   Constitutional: Negative  HENT: Negative  Eyes: Positive for visual disturbance (resolved)  Negative for photophobia  Respiratory: Negative  Cardiovascular: Negative  Gastrointestinal: Negative  Musculoskeletal: Positive for arthralgias (R shoulder/rotator cuff)  Skin: Negative  Neurological: Negative  Negative for dizziness, tremors, seizures, syncope, facial asymmetry, speech difficulty, weakness, light-headedness, numbness and headaches  All other ROS reviewed and negative       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    • Diabetes mellitus (HCC)    • GERD (gastroesophageal reflux disease)    • Renal disorder    • Sleep apnea      Past Surgical History:   Procedure Laterality Date   •  SECTION     • COLONOSCOPY     • RENAL CYST EXCISION     • SHOULDER SURGERY      plastic hardware   • TONSILLECTOMY     • US GUIDED THYROID BIOPSY  2021     Social History   Social History     Substance and Sexual Activity   Alcohol Use Never     Social History     Substance and Sexual Activity   Drug Use Not Currently     E-Cigarette/Vaping   • E-Cigarette Use Never User      E-Cigarette/Vaping Substances   • Nicotine No    • THC No    • CBD No    • Flavoring No    • Other No    • Unknown No      Social History     Tobacco Use   Smoking Status Former   • Packs/day: 0 50   • Types: Cigarettes   • Quit date: 10/29/1981   • Years since quittin 4   Smokeless Tobacco Never     Family History:   Family History   Problem Relation Age of Onset   • COPD Mother    • Lung cancer Mother    • Lung cancer Father    • Hypertension Father    • No Known Problems Sister    • Hypertension Brother    • Colon polyps Brother    • Kidney disease Brother    • Colon polyps Brother    • Stroke Brother    • Kidney disease Brother    • Hypertension Brother    • Hyperlipidemia Brother    • Gestational diabetes Sister    • Colon cancer Maternal Aunt        Review of previous medical records was completed      Meds/Allergies   current meds:   Current Facility-Administered Medications   Medication Dose Route Frequency   • acetaminophen (TYLENOL) tablet 650 mg  650 mg Oral Q4H PRN   • aluminum-magnesium hydroxide-simethicone (MYLANTA) oral suspension 30 mL  30 mL Oral Q6H PRN   • aspirin chewable tablet 81 mg  81 mg Oral Every Other Day   • butalbital-acetaminophen-caffeine (FIORICET,ESGIC) -40 mg per tablet 1 tablet  1 tablet Oral Q4H PRN   • cholecalciferol (VITAMIN D3) tablet 2,000 Units  2,000 Units Oral Daily   • dextromethorphan-guaiFENesin (ROBITUSSIN DM) oral syrup 10 mL  10 mL Oral Q6H PRN   • insulin lispro (HumaLOG) 100 units/mL subcutaneous injection 1-5 Units  1-5 Units Subcutaneous 4x Daily (AC & HS)   • losartan (COZAAR) tablet 50 mg  50 mg Oral Daily   • magnesium sulfate IVPB (premix) SOLN 1 g  1 g Intravenous Once   • ondansetron (ZOFRAN) injection 4 mg  4 mg Intravenous Q6H PRN   • pantoprazole (PROTONIX) EC tablet 40 mg  40 mg Oral Daily   • potassium chloride (K-DUR,KLOR-CON) CR tablet 40 mEq  40 mEq Oral Once   • pravastatin (PRAVACHOL) tablet 80 mg  80 mg Oral Daily With Dinner   • sodium chloride 0 9 % infusion  100 mL/hr Intravenous Continuous    and PTA meds:   Prior to Admission Medications   Prescriptions Last Dose Informant Patient Reported? Taking?   aspirin 81 mg chewable tablet 3/22/2023  Yes Yes   Sig: Chew 81 mg every other day    cholecalciferol (VITAMIN D3) 1,000 units tablet 3/22/2023  Yes Yes   Sig: Take 2,000 Units by mouth daily   dicyclomine (BENTYL) 10 mg capsule   No No   Sig: TAKE 1 CAPSULE BY MOUTH 2 TIMES A DAY  escitalopram (LEXAPRO) 10 mg tablet   Yes No   Sig: Take 10 mg by mouth daily   halobetasol (ULTRAVATE) 0 05 % ointment Not Taking  No No   Sig: Apply daily     Patient not taking: Reported on 7/14/2021   losartan (COZAAR) 50 mg tablet 3/22/2023  No Yes   Sig: TAKE 1 TABLET BY MOUTH EVERY DAY   metFORMIN (GLUCOPHAGE) 1000 MG tablet 3/22/2023  No Yes   Sig: TAKE 1 TABLET BY MOUTH TWICE A DAY WITH MEALS   pantoprazole (PROTONIX) 40 mg tablet 3/22/2023  No Yes   Sig: TAKE 1 TABLET BY MOUTH EVERY DAY   simvastatin (ZOCOR) 40 mg tablet 3/21/2023  Yes Yes   Sig: Take 40 mg by mouth every evening      Facility-Administered Medications: None       Allergies   Allergen Reactions   • Demerol [Meperidine] GI Intolerance     Other reaction(s): N/V   • Morphine GI Intolerance     Other reaction(s): N/V   • Meloxicam Swelling       Objective   Vitals:Blood pressure 136/60, pulse 59, temperature 97 9 °F (36 6 °C), resp  rate 21, height 5' 5 25" (1 657 m), weight 96 kg (211 lb 10 3 oz), SpO2 95 %  ,Body mass index is 34 95 kg/m²  No intake or output data in the 24 hours ending 03/23/23 0726    Invasive Devices: Invasive Devices     Peripheral Intravenous Line  Duration           Peripheral IV 03/22/23 Right Antecubital <1 day                Physical Exam  Constitutional:       Appearance: Normal appearance  HENT:      Head: Normocephalic and atraumatic  Eyes:      Extraocular Movements: Extraocular movements intact and EOM normal       Conjunctiva/sclera: Conjunctivae normal       Pupils: Pupils are equal, round, and reactive to light  Cardiovascular:      Rate and Rhythm: Normal rate  Pulmonary:      Effort: Pulmonary effort is normal    Abdominal:      General: There is no distension  Musculoskeletal:         General: Normal range of motion  Cervical back: Normal range of motion and neck supple  Skin:     General: Skin is warm and dry  Neurological:      General: No focal deficit present  Mental Status: She is alert  Coordination: Finger-Nose-Finger Test (deferred on R with recent shoulder injury) and Heel to [facility] Test normal       Deep Tendon Reflexes:      Reflex Scores:       Bicep reflexes are 2+ on the right side and 2+ on the left side  Brachioradialis reflexes are 2+ on the right side and 2+ on the left side  Patellar reflexes are 2+ on the right side and 2+ on the left side  Achilles reflexes are 1+ on the right side and 1+ on the left side  Neurologic Exam     Mental Status   Awake, alert, oriented to person, place, nearly the date, no aphasia nor dysarthria with conversation, following all commands  Cranial Nerves     CN II   Visual fields full to confrontation  CN III, IV, VI   Pupils are equal, round, and reactive to light  Extraocular motions are normal      CN V   Facial sensation intact       CN VII   Facial expression full, symmetric  CN VIII   CN VIII normal      CN IX, X   CN IX normal    CN X normal      CN XI   CN XI normal      CN XII   CN XII normal      Pupils equal/reactive, smooth pursuit with EOM's, no visual cut nor neglect with monocular eye testing (in all fields)  Motor Exam   Muscle bulk: normal  Overall muscle tone: normal  R UE proximally is pain limited with R should/rotator cuff injury; but otherwise full/age appropriate strength throughout the UE/LE  Sensory Exam   Light touch normal      Intact and symmetric throughout to light touch, vibration, temperature        Gait, Coordination, and Reflexes     Coordination   Finger to nose coordination: normal (deferred on R with recent shoulder injury)  Heel to shin coordination: normal    Tremor   Resting tremor: absent  Intention tremor: absent    Reflexes   Right brachioradialis: 2+  Left brachioradialis: 2+  Right biceps: 2+  Left biceps: 2+  Right patellar: 2+  Left patellar: 2+  Right achilles: 1+  Left achilles: 1+  Right plantar: normal  Left plantar: normal  Right Barragan: absent  Left Barragan: absent      Lab Results:   CBC:   Results from last 7 days   Lab Units 03/23/23  0450 03/22/23  1603   WBC Thousand/uL 6 03 7 41   RBC Million/uL 3 71* 4 16   HEMOGLOBIN g/dL 10 4* 11 8   HEMATOCRIT % 34 2* 37 5   MCV fL 92 90   PLATELETS Thousands/uL 198 210   , BMP/CMP:   Results from last 7 days   Lab Units 03/23/23  0450 03/22/23  1603   SODIUM mmol/L 138 139   POTASSIUM mmol/L 3 8 3 9   CHLORIDE mmol/L 106 103   CO2 mmol/L 28 27   BUN mg/dL 14 15   CREATININE mg/dL 0 67 0 66   CALCIUM mg/dL 9 5 10 5*   EGFR ml/min/1 73sq m 90 91   , Vitamin B12:   , HgBA1C:   Results from last 7 days   Lab Units 03/22/23  1603   HEMOGLOBIN A1C % 6 5*   , TSH:   , Coagulation:   Results from last 7 days   Lab Units 03/22/23  1603   INR  0 91   , Lipid Profile:   Results from last 7 days   Lab Units 03/23/23  0450   HDL mg/dL 36*   LDL CALC mg/dL 52 TRIGLYCERIDES mg/dL 84   , Ammonia:   , Urinalysis:       Invalid input(s): URIBILINOGEN, Drug Screen:   , Medication Drug Levels:       Invalid input(s): CARBAMAZEPINE,  PHENOBARB, LACOSAMIDE, OXCARBAZEPINE  Imaging Studies: I have personally reviewed pertinent films in PACS   MRI brain pituitary wo and w contrast   Final Result by Laura Knight MD (03/22 2250)         1  Unremarkable exam of the pituitary  No evidence of lesion  2   Mild microangiopathic change  No evidence of acute infarct, intracranial hemorrhage or mass  Workstation performed: [id number]         CTA stroke alert (head/neck)   Final Result by Sona Roberts MD (03/22 1630)      No significant stenosis of the cervical carotid or vertebral arteries  No significant intracranial stenosis, large vessel occlusion or aneurysm  Findings were directly discussed with J Carlos Vasquez at 4:21 PM                            Workstation performed: [id number]         CT stroke alert brain   Final Result by Sona Roberts MD (03/22 1631)      No acute intracranial abnormality  Mild chronic microangiopathy  Findings were directly discussed with J Carlos Vasquez at 4:21 PM       Workstation performed: [id number]         VAS temporal artery duplex    (Results Pending)       EKG, Pathology, and Other Studies: I have personally reviewed pertinent reports  VTE Prophylaxis: Sequential compression device (Venodyne)     Code Status: Level 1 - Full Code    Total time spent today 50 minutes

## 2023-03-23 NOTE — NURSING NOTE
Reviewed d/c instructions with Pt , new rx script and f/u appt  With pt  Pt  Is d/c'd to home and will be transported by her spouse  Pt  Currently awaiting her  to arrive  Will inform next shift Pt  Is awaiting her ride

## 2023-03-23 NOTE — PLAN OF CARE
Problem: PAIN - ADULT  Goal: Verbalizes/displays adequate comfort level or baseline comfort level  Description: Interventions:  - Encourage patient to monitor pain and request assistance  - Assess pain using appropriate pain scale  - Administer analgesics based on type and severity of pain and evaluate response  - Implement non-pharmacological measures as appropriate and evaluate response  - Consider cultural and social influences on pain and pain management  - Notify physician/advanced practitioner if interventions unsuccessful or patient reports new pain  Outcome: Progressing     Problem: INFECTION - ADULT  Goal: Absence or prevention of progression during hospitalization  Description: INTERVENTIONS:  - Assess and monitor for signs and symptoms of infection  - Monitor lab/diagnostic results  - Monitor all insertion sites, i e  indwelling lines, tubes, and drains  - Monitor endotracheal if appropriate and nasal secretions for changes in amount and color  - Benedict appropriate cooling/warming therapies per order  - Administer medications as ordered  - Instruct and encourage patient and family to use good hand hygiene technique  - Identify and instruct in appropriate isolation precautions for identified infection/condition  Outcome: Progressing  Goal: Absence of fever/infection during neutropenic period  Description: INTERVENTIONS:  - Monitor WBC    Outcome: Progressing     Problem: SAFETY ADULT  Goal: Patient will remain free of falls  Description: INTERVENTIONS:  - Educate patient/family on patient safety including physical limitations  - Instruct patient to call for assistance with activity   - Consult OT/PT to assist with strengthening/mobility   - Keep Call bell within reach  - Keep bed low and locked with side rails adjusted as appropriate  - Keep care items and personal belongings within reach  - Initiate and maintain comfort rounds  - Make Fall Risk Sign visible to staff  - Offer Toileting every x Hours, in advance of need  - Initiate/Maintain xalarm  - Obtain necessary fall risk management equipment: x  - Apply yellow socks and bracelet for high fall risk patients  - Consider moving patient to room near nurses station  Outcome: Progressing  Goal: Maintain or return to baseline ADL function  Description: INTERVENTIONS:  -  Assess patient's ability to carry out ADLs; assess patient's baseline for ADL function and identify physical deficits which impact ability to perform ADLs (bathing, care of mouth/teeth, toileting, grooming, dressing, etc )  - Assess/evaluate cause of self-care deficits   - Assess range of motion  - Assess patient's mobility; develop plan if impaired  - Assess patient's need for assistive devices and provide as appropriate  - Encourage maximum independence but intervene and supervise when necessary  - Involve family in performance of ADLs  - Assess for home care needs following discharge   - Consider OT consult to assist with ADL evaluation and planning for discharge  - Provide patient education as appropriate  Outcome: Progressing  Goal: Maintains/Returns to pre admission functional level  Description: INTERVENTIONS:  - Perform BMAT or MOVE assessment daily    - Set and communicate daily mobility goal to care team and patient/family/caregiver  - Collaborate with rehabilitation services on mobility goals if consulted  - Perform Range of Motion x times a day  - Reposition patient every x hours    - Dangle patient x times a day  - Stand patient x times a day  - Ambulate patient x times a day  - Out of bed to chair x times a day   - Out of bed for meals x times a day  - Out of bed for toileting  - Record patient progress and toleration of activity level   Outcome: Progressing     Problem: DISCHARGE PLANNING  Goal: Discharge to home or other facility with appropriate resources  Description: INTERVENTIONS:  - Identify barriers to discharge w/patient and caregiver  - Arrange for needed discharge resources and transportation as appropriate  - Identify discharge learning needs (meds, wound care, etc )  - Arrange for interpretive services to assist at discharge as needed  - Refer to Case Management Department for coordinating discharge planning if the patient needs post-hospital services based on physician/advanced practitioner order or complex needs related to functional status, cognitive ability, or social support system  Outcome: Progressing     Problem: Knowledge Deficit  Goal: Patient/family/caregiver demonstrates understanding of disease process, treatment plan, medications, and discharge instructions  Description: Complete learning assessment and assess knowledge base    Interventions:  - Provide teaching at level of understanding  - Provide teaching via preferred learning methods  Outcome: Progressing     Problem: NEUROSENSORY - ADULT  Goal: Achieves stable or improved neurological status  Description: INTERVENTIONS  - Monitor and report changes in neurological status  - Monitor vital signs such as temperature, blood pressure, glucose, and any other labs ordered   - Initiate measures to prevent increased intracranial pressure  - Monitor for seizure activity and implement precautions if appropriate      Outcome: Progressing  Goal: Remains free of injury related to seizures activity  Description: INTERVENTIONS  - Maintain airway, patient safety  and administer oxygen as ordered  - Monitor patient for seizure activity, document and report duration and description of seizure to physician/advanced practitioner  - If seizure occurs,  ensure patient safety during seizure  - Reorient patient post seizure  - Seizure pads on all 4 side rails  - Instruct patient/family to notify RN of any seizure activity including if an aura is experienced  - Instruct patient/family to call for assistance with activity based on nursing assessment  - Administer anti-seizure medications if ordered    Outcome: Progressing  Goal: Achieves maximal functionality and self care  Description: INTERVENTIONS  - Monitor swallowing and airway patency with patient fatigue and changes in neurological status  - Encourage and assist patient to increase activity and self care  - Encourage visually impaired, hearing impaired and aphasic patients to use assistive/communication devices  Outcome: Progressing     Problem: Neurological Deficit  Goal: Neurological status is stable or improving  Description: Interventions:  - Monitor and assess patient's level of consciousness, motor function, sensory function, and level of assistance needed for ADLs  - Monitor and report changes from baseline  Collaborate with interdisciplinary team to initiate plan and implement interventions as ordered  - Provide and maintain a safe environment  - Consider seizure precautions  - Consider fall precautions  - Consider aspiration precautions  - Consider bleeding precautions  Outcome: Progressing     Problem: Activity Intolerance/Impaired Mobility  Goal: Mobility/activity is maintained at optimum level for patient  Description: Interventions:  - Assess and monitor patient  barriers to mobility and need for assistive/adaptive devices  - Assess patient's emotional response to limitations  - Collaborate with interdisciplinary team and initiate plans and interventions as ordered  - Encourage independent activity per ability   - Maintain proper body alignment  - Perform active/passive rom as tolerated/ordered  - Plan activities to conserve energy   - Turn patient as appropriate  Outcome: Progressing     Problem: Communication Impairment  Goal: Ability to express needs and understand communication  Description: Assess patient's communication skills and ability to understand information  Patient will demonstrate use of effective communication techniques, alternative methods of communication and understanding even if not able to speak       - Encourage communication and provide alternate methods of communication as needed  - Collaborate with case management/ for discharge needs  - Include patient/family/caregiver in decisions related to communication  Outcome: Progressing     Problem: Potential for Aspiration  Goal: Non-ventilated patient's risk of aspiration is minimized  Description: Assess and monitor vital signs, respiratory status, and labs (WBC)  Monitor for signs of aspiration (tachypnea, cough, rales, wheezing, cyanosis, fever)  - Assess and monitor patient's ability to swallow  - Place patient up in chair to eat if possible  - HOB up at 90 degrees to eat if unable to get patient up into chair   - Supervise patient during oral intake  - Instruct patient/ family to take small bites  - Instruct patient/ family to take small single sips when taking liquids  - Follow patient-specific strategies generated by speech pathologist   Outcome: Progressing     Problem: Nutrition  Goal: Nutrition/Hydration status is improving  Description: Monitor and assess patient's nutrition/hydration status for malnutrition (ex- brittle hair, bruises, dry skin, pale skin and conjunctiva, muscle wasting, smooth red tongue, and disorientation)  Collaborate with interdisciplinary team and initiate plan and interventions as ordered  Monitor patient's weight and dietary intake as ordered or per policy  Utilize nutrition screening tool and intervene per policy  Determine patient's food preferences and provide high-protein, high-caloric foods as appropriate  - Assist patient with eating   - Allow adequate time for meals   - Encourage patient to take dietary supplement as ordered  - Collaborate with clinical nutritionist   - Include patient/family/caregiver in decisions related to nutrition    Outcome: Progressing     Problem: CARDIOVASCULAR - ADULT  Goal: Maintains optimal cardiac output and hemodynamic stability  Description: INTERVENTIONS:  - Monitor I/O, vital signs and rhythm  - Monitor for S/S and trends of decreased cardiac output  - Administer and titrate ordered vasoactive medications to optimize hemodynamic stability  - Assess quality of pulses, skin color and temperature  - Assess for signs of decreased coronary artery perfusion  - Instruct patient to report change in severity of symptoms  Outcome: Progressing  Goal: Absence of cardiac dysrhythmias or at baseline rhythm  Description: INTERVENTIONS:  - Continuous cardiac monitoring, vital signs, obtain 12 lead EKG if ordered  - Administer antiarrhythmic and heart rate control medications as ordered  - Monitor electrolytes and administer replacement therapy as ordered  Outcome: Progressing     Problem: METABOLIC, FLUID AND ELECTROLYTES - ADULT  Goal: Electrolytes maintained within normal limits  Description: INTERVENTIONS:  - Monitor labs and assess patient for signs and symptoms of electrolyte imbalances  - Administer electrolyte replacement as ordered  - Monitor response to electrolyte replacements, including repeat lab results as appropriate  - Instruct patient on fluid and nutrition as appropriate  Outcome: Progressing  Goal: Fluid balance maintained  Description: INTERVENTIONS:  - Monitor labs   - Monitor I/O and WT  - Instruct patient on fluid and nutrition as appropriate  - Assess for signs & symptoms of volume excess or deficit  Outcome: Progressing     Problem: SKIN/TISSUE INTEGRITY - ADULT  Goal: Skin Integrity remains intact(Skin Breakdown Prevention)  Description: Assess:  -Perform Javi assessment every x  -Clean and moisturize skin every x  -Inspect skin when repositioning, toileting, and assisting with ADLS  -Assess under medical devices such as x every x  -Assess extremities for adequate circulation and sensation     Bed Management:  -Have minimal linens on bed & keep smooth, unwrinkled  -Change linens as needed when moist or perspiring  -Avoid sitting or lying in one position for more than x hours while in bed  -Keep HOB at [facility]     Toileting:  -Offer bedside commode  -Assess for incontinence every x  -Use incontinent care products after each incontinent episode such as x    Activity:  -Mobilize patient x times a day  -Encourage activity and walks on unit  -Encourage or provide ROM exercises   -Turn and reposition patient every x Hours  -Use appropriate equipment to lift or move patient in bed  -Instruct/ Assist with weight shifting every x when out of bed in chair  -Consider limitation of chair time x hour intervals    Skin Care:  -Avoid use of baby powder, tape, friction and shearing, hot water or constrictive clothing  -Relieve pressure over bony prominences using x  -Do not massage red bony areas    Next Steps:  -Teach patient strategies to minimize risks such as x   -Consider consults to  interdisciplinary teams such as x  Outcome: Progressing     Problem: HEMATOLOGIC - ADULT  Goal: Maintains hematologic stability  Description: INTERVENTIONS  - Assess for signs and symptoms of bleeding or hemorrhage  - Monitor labs  - Administer supportive blood products/factors as ordered and appropriate  Outcome: Progressing     Problem: MUSCULOSKELETAL - ADULT  Goal: Maintain or return mobility to safest level of function  Description: INTERVENTIONS:  - Assess patient's ability to carry out ADLs; assess patient's baseline for ADL function and identify physical deficits which impact ability to perform ADLs (bathing, care of mouth/teeth, toileting, grooming, dressing, etc )  - Assess/evaluate cause of self-care deficits   - Assess range of motion  - Assess patient's mobility  - Assess patient's need for assistive devices and provide as appropriate  - Encourage maximum independence but intervene and supervise when necessary  - Involve family in performance of ADLs  - Assess for home care needs following discharge   - Consider OT consult to assist with ADL evaluation and planning for discharge  - Provide patient education as appropriate  Outcome: Progressing     Problem: Nutrition/Hydration-ADULT  Goal: Nutrient/Hydration intake appropriate for improving, restoring or maintaining nutritional needs  Description: Monitor and assess patient's nutrition/hydration status for malnutrition  Collaborate with interdisciplinary team and initiate plan and interventions as ordered  Monitor patient's weight and dietary intake as ordered or per policy  Utilize nutrition screening tool and intervene as necessary  Determine patient's food preferences and provide high-protein, high-caloric foods as appropriate       INTERVENTIONS:  - Monitor oral intake, urinary output, labs, and treatment plans  - Assess nutrition and hydration status and recommend course of action  - Evaluate amount of meals eaten  - Assist patient with eating if necessary   - Allow adequate time for meals  - Recommend/ encourage appropriate diets, oral nutritional supplements, and vitamin/mineral supplements  - Order, calculate, and assess calorie counts as needed  - Recommend, monitor, and adjust tube feedings and TPN/PPN based on assessed needs  - Assess need for intravenous fluids  - Provide specific nutrition/hydration education as appropriate  - Include patient/family/caregiver in decisions related to nutrition  Outcome: Progressing     Problem: Prexisting or High Potential for Compromised Skin Integrity  Goal: Skin integrity is maintained or improved  Description: INTERVENTIONS:  - Identify patients at risk for skin breakdown  - Assess and monitor skin integrity  - Assess and monitor nutrition and hydration status  - Monitor labs   - Assess for incontinence   - Turn and reposition patient  - Assist with mobility/ambulation  - Relieve pressure over bony prominences  - Avoid friction and shearing  - Provide appropriate hygiene as needed including keeping skin clean and dry  - Evaluate need for skin moisturizer/barrier cream  - Collaborate with interdisciplinary team   - Patient/family teaching  - Consider wound care consult   Outcome: Progressing     Problem: MOBILITY - ADULT  Goal: Maintain or return to baseline ADL function  Description: INTERVENTIONS:  -  Assess patient's ability to carry out ADLs; assess patient's baseline for ADL function and identify physical deficits which impact ability to perform ADLs (bathing, care of mouth/teeth, toileting, grooming, dressing, etc )  - Assess/evaluate cause of self-care deficits   - Assess range of motion  - Assess patient's mobility; develop plan if impaired  - Assess patient's need for assistive devices and provide as appropriate  - Encourage maximum independence but intervene and supervise when necessary  - Involve family in performance of ADLs  - Assess for home care needs following discharge   - Consider OT consult to assist with ADL evaluation and planning for discharge  - Provide patient education as appropriate  Outcome: Progressing  Goal: Maintains/Returns to pre admission functional level  Description: INTERVENTIONS:  - Perform BMAT or MOVE assessment daily    - Set and communicate daily mobility goal to care team and patient/family/caregiver  - Collaborate with rehabilitation services on mobility goals if consulted  - Perform Range of Motion x times a day  - Reposition patient every x hours    - Dangle patient x times a day  - Stand patient x times a day  - Ambulate patient x times a day  - Out of bed to chair x times a day   - Out of bed for meals xxxx times a day  - Out of bed for toileting  - Record patient progress and toleration of activity level   Outcome: Progressing     Problem: Potential for Falls  Goal: Patient will remain free of falls  Description: INTERVENTIONS:  - Educate patient/family on patient safety including physical limitations  - Instruct patient to call for assistance with activity   - Consult OT/PT to assist with strengthening/mobility   - Keep Call bell within reach  - Keep bed low and locked with side rails adjusted as appropriate  - Keep care items and personal belongings within reach  - Initiate and maintain comfort rounds  - Make Fall Risk Sign visible to staff  - Offer Toileting every x Hours, in advance of need  - Initiate/Maintain xalarm  - Obtain necessary fall risk management equipment: x  - Apply yellow socks and bracelet for high fall risk patients  - Consider moving patient to room near nurses station  Outcome: Progressing

## 2023-03-23 NOTE — UTILIZATION REVIEW
Initial Clinical Review    Admission: Date/Time/Statement:   Admission Orders (From admission, onward)     Ordered        03/22/23 1921  Place in Observation  Once                      Orders Placed This Encounter   Procedures   • Place in Observation     Standing Status:   Standing     Number of Occurrences:   1     Order Specific Question:   Level of Care     Answer:   Med Surg [16]     ED Arrival Information     Expected   -    Arrival   3/22/2023 15:38    Acuity   Emergent            Means of arrival   Walk-In    Escorted by   Spouse    Service   Hospitalist    Admission type   Emergency            Arrival complaint   vision disturbance, headache           Chief Complaint   Patient presents with   • Headache     Pt c/o HA since today, states she couldn't see the left edges of the cards she was holding for several minutes and then a second episode an hour later  Initial Presentation: 75 y o  female PMH of HTN, HLD, T2DM, sleep apnea who presents to ED from home with severe headache and visual disturbances  Woke up feeling well, although recovering from the virus, then developed sudden onset severe headache that started in her right temporal and radiated down to her neck as well as left-sided temporal vision loss   On exam, BP elevated, , visual disturbance resolved, no neuro deficits stroke alert called   NIHSS 1  Eye pressures were checked: R eye - 13  L eye - 14 Labs -elevated CRP, ESR  CT brain , CTA H/N show no acute findings  Pt given IV Hydralazine in ED  PT admitted asOBS to telemetry with visual disturbance,  HTN on modified stroke path    PLan - telemetry, MRI brain, echo, lipid, A1c, Does not require permissive HTN, treat HAw/ fioecet and reeval BP  Continue home Losartan   Neuro consult   Neurology- 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 on CTA head in 2021   Last normal last night 12:30am  Woke up 8:30am today with  visual disturbance described to me as bitemporal / peripherally visual field obscuration with R sided headache  NOT a hemifield visual disturbance and no other neurologic deficits noted on exam  PT out of time window for IV thrombolytic  Ade Anguiano PLan - neuro checks, SBP goal normotension , Continue lauren ASA and statin   Check ESR and CRP, check IOPs        Date: 3/23   Day 2:        ED Triage Vitals   Temperature Pulse Respirations Blood Pressure SpO2   03/22/23 1546 03/22/23 1546 03/22/23 1546 03/22/23 1546 03/22/23 1600   99 3 °F (37 4 °C) 88 18 (!) 173/74 98 %      Temp Source Heart Rate Source Patient Position - Orthostatic VS BP Location FiO2 (%)   03/22/23 1546 03/22/23 1546 03/22/23 1546 03/22/23 1546 --   Temporal Monitor Sitting Left arm       Pain Score       03/22/23 1601       10 - Worst Possible Pain          Wt Readings from Last 1 Encounters:   03/22/23 96 kg (211 lb 10 3 oz)     Additional Vital Signs:   Date/Time Temp Pulse Resp BP MAP (mmHg) SpO2   03/23/23 07:31:36 97 8 °F (36 6 °C) 67 -- 124/59 81 95 %   03/23/23 07:28:15 97 8 °F (36 6 °C) 60 -- 124/59 81 95 %   03/23/23 04:53:41 -- 59 -- 136/60 85 95 %   03/23/23 02:39:48 -- 65 -- 125/62 83 96 %   03/23/23 0040 -- 62 -- 146/68 94 --   03/22/23 2350 -- 67 -- -- -- --   03/22/23 2345 -- 72 -- 123/72 89 94 %   03/22/23 2245 -- 73 -- 116/74 88 95 %   03/22/23 22:37:24 -- 82 -- 116/74 88 97 %   03/22/23 2215 97 9 °F (36 6 °C) 83 -- 169/74 106 97 %   03/22/23 22:13:26 97 9 °F (36 6 °C) 84 -- 169/74 106 96 %   03/22/23 2050 -- 81 -- -- -- --   03/22/23 2045 -- 81 -- 171/75 Abnormal  107 93 %   03/22/23 20:32:29 98 5 °F (36 9 °C) 89 -- 171/75 Abnormal  107 93 %   03/22/23 2030 98 5 °F (36 9 °C) 99 -- -- -- 94 %   03/22/23 2000 -- 85 21 168/72 104 94 %   03/22/23 1930 -- 96 20 170/81 116 95 %   03/22/23 1920 -- 99 22 -- -- 95 %   03/22/23 1915 -- 94 20 167/72 103 94 %   03/22/23 1910 -- 97 29 Abnormal  -- -- 93 %   03/22/23 1905 -- 97 27 Abnormal  -- -- 94 %   03/22/23 1900 -- 94 20 182/76 Abnormal  109 95 %   03/22/23 1855 -- 88 22 -- -- 94 %   03/22/23 1850 -- 87 22 -- -- 94 %   03/22/23 1845 -- 88 22 -- -- 94 %   03/22/23 1841 -- 88 21 -- -- 94 %   03/22/23 1830 -- -- -- 162/70 -- --   03/22/23 1824 -- -- -- 159/69 -- --   03/22/23 1800 -- 81 18 159/71 102 94 %   03/22/23 1730 -- 79 18 168/71 102 98 %   03/22/23 1715 -- 81 18 165/58 102 95 %   03/22/23 1700 -- 83 18 170/72 -- 94 %   03/22/23 1645 -- 82 18 170/74 106 95 %   03/22/23 1630 -- 80 18 166/67 109 94 %   03/22/23 1615 -- 90 18 162/72 104 96 %   03/22/23 1600 -- 89 16 170/76 -- 98 %     Date and Time Eye Opening Best Verbal Response Best Motor Response Tamms Coma Scale Score   03/22/23 2100 4 5 6 15   03/22/23 2000 4 5 6 15   03/22/23 1900 4 5 6 15   03/22/23 1830 4 5 6 15   03/22/23 1800 4 5 6 15   03/22/23 1730 4 5 6 15   03/22/23 1700 4 5 6 15   03/22/23 1645 4 5 6 15   03/22/23 1630 4 5 6 15   03/22/23 1615 4 5 6 15   03/22/23 1601 4 5 6 15     Pertinent Labs/Diagnostic Test Results:    3/22 ECG- NSR    MRI brain pituitary wo and w contrast   Final Result by Lea Juares MD (03/22 2250)         1  Unremarkable exam of the pituitary  No evidence of lesion  2   Mild microangiopathic change  No evidence of acute infarct, intracranial hemorrhage or mass  Workstation performed: [id number]         CTA stroke alert (head/neck)   Final Result by Carlos Bailey MD (03/22 1630)      No significant stenosis of the cervical carotid or vertebral arteries  No significant intracranial stenosis, large vessel occlusion or aneurysm  Findings were directly discussed with Ana Coombs at 4:21 PM                            Workstation performed: [id number]         CT stroke alert brain   Final Result by Carlos Bailey MD (03/22 1631)      No acute intracranial abnormality  Mild chronic microangiopathy        Findings were directly discussed with Ana Coombs at 4:21 PM       Workstation performed: [id number]         VAS temporal artery duplex    (Results Pending)     Results from last 7 days   Lab Units 03/22/23  1638   SARS-COV-2  Negative     Results from last 7 days   Lab Units 03/23/23  0450 03/22/23  1603   WBC Thousand/uL 6 03 7 41   HEMOGLOBIN g/dL 10 4* 11 8   HEMATOCRIT % 34 2* 37 5   PLATELETS Thousands/uL 198 210   NEUTROS ABS Thousands/µL 3 34  --          Results from last 7 days   Lab Units 03/23/23  0450 03/22/23  1603   SODIUM mmol/L 138 139   POTASSIUM mmol/L 3 8 3 9   CHLORIDE mmol/L 106 103   CO2 mmol/L 28 27   ANION GAP mmol/L 4 9   BUN mg/dL 14 15   CREATININE mg/dL 0 67 0 66   EGFR ml/min/1 73sq m 90 91   CALCIUM mg/dL 9 5 10 5*   MAGNESIUM mg/dL 1 8*  --          Results from last 7 days   Lab Units 03/23/23  0727 03/22/23  2213 03/22/23  1600   POC GLUCOSE mg/dl 129 168* 148*     Results from last 7 days   Lab Units 03/23/23  0450 03/22/23  1603   GLUCOSE RANDOM mg/dL 129 159*         Results from last 7 days   Lab Units 03/22/23  1603   HEMOGLOBIN A1C % 6 5*   EAG mg/dl 140     No results found for: BETA-HYDROXYBUTYRATE                   Results from last 7 days   Lab Units 03/22/23  1936 03/22/23  1819 03/22/23  1604   HS TNI 0HR ng/L  --   --  4   HS TNI 2HR ng/L  --  4  --    HSTNI D2 ng/L  --  0  --    HS TNI 4HR ng/L 5  --   --    HSTNI D4 ng/L 1  --   --          Results from last 7 days   Lab Units 03/22/23  1603   PROTIME seconds 12 9   INR  0 91   PTT seconds 29                                         Results from last 7 days   Lab Units 03/22/23  1819 03/22/23  1603   CRP mg/L  --  11 7*   SED RATE mm/hour 41*  --                  Results from last 7 days   Lab Units 03/22/23  1638   INFLUENZA A PCR  Negative   INFLUENZA B PCR  Negative   RSV PCR  Negative                                           ED Treatment:   Medication Administration from 03/22/2023 1538 to 03/22/2023 2026       Date/Time Order Dose Route Action     03/22/2023 1609 EDT iohexol (OMNIPAQUE) 350 MG/ML injection (SINGLE-DOSE) 100 mL 85 mL Intravenous Given 03/22/2023 1623 EDT tetracaine 0 5 % ophthalmic solution 2 drop 2 drop Both Eyes Given     03/22/2023 1623 EDT fluorescein sodium sterile ophthalmic strip 1 strip 1 strip Both Eyes Given     03/22/2023 1702 EDT diphenhydrAMINE (BENADRYL) injection 25 mg 25 mg Intravenous Given     03/22/2023 1703 EDT metoclopramide (REGLAN) injection 10 mg 10 mg Intravenous Given     03/22/2023 1824 EDT hydrALAZINE (APRESOLINE) injection 10 mg 10 mg Intravenous Given     03/22/2023 1916 EDT butalbital-acetaminophen-caffeine (FIORICET,ESGIC) -40 mg per tablet 1 tablet 1 tablet Oral Given        Past Medical History:   Diagnosis Date   • Arthritis    • 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    • Diabetes mellitus ([address] )    • GERD (gastroesophageal reflux disease)    • Renal disorder    • Sleep apnea      Present on Admission:  • Essential hypertension  • Hyperlipidemia  • Type 2 diabetes mellitus without complication, without long-term current use of insulin ([facility])      Admitting Diagnosis: Visual disturbance [H53 9]  Hypertensive urgency [I16 0]  Headache [R51 9]  Age/Sex: 75 y o  female  Admission Orders:  Scheduled Medications:  aspirin, 81 mg, Oral, Daily  cholecalciferol, 2,000 Units, Oral, Daily  insulin lispro, 1-5 Units, Subcutaneous, 4x Daily (AC & HS)  losartan, 50 mg, Oral, Daily  magnesium sulfate, 1 g, Intravenous, Once  pantoprazole, 40 mg, Oral, Daily  pravastatin, 80 mg, Oral, Daily With Dinner    potassium chloride (K-DUR,KLOR-CON) CR tablet 40 mEq  Dose: 40 mEq  Freq: Once Route: PO  Start: 03/23/23 0715 End: 03/23/23 0833  metoclopramide (REGLAN) injection 10 mg  Dose: 10 mg  Freq: Once Route: IV  Start: 03/23/23 0300 End: 03/23/23 0304  diphenhydrAMINE (BENADRYL) injection 25 mg  Dose: 25 mg  Freq: Once Route: IV  Start: 03/23/23 0300 End: 03/23/23 0303  magnesium sulfate IVPB (premix) SOLN 1 g  Dose: 1 g  Freq:  Once Route: IV  Start: 03/22/23 2030 End: 03/22/23 2156        Continuous IV Infusions:    sodium chloride 0 9 % infusion  Rate: 100 mL/hr Dose: 100 mL/hr  Freq: Continuous Route: IV  Last Dose: Stopped (03/23/23 0830)    PRN Meds:  acetaminophen, 650 mg, Oral, Q4H PRN x1 3/22  aluminum-magnesium hydroxide-simethicone, 30 mL, Oral, Q6H PRN  butalbital-acetaminophen-caffeine, 1 tablet, Oral, Q4H PRN x1 3/22  dextromethorphan-guaiFENesin, 10 mL, Oral, Q6H PRN x12 3/22  ondansetron, 4 mg, Intravenous, Q6H PRN    Neuro checks   CPAP at HS   telemetry   NIHSS       IP CONSULT TO NEUROLOGY      Network Utilization Review Department  ATTENTION: Please call with any questions or concerns to [phone] and carefully listen to the prompts so that you are directed to the right person  All voicemails are confidential   David Guerrier all requests for admission clinical reviews, approved or denied determinations and any other requests to dedicated fax number below belonging to the campus where the patient is receiving treatment   List of dedicated fax numbers for the Facilities:  [address] DENIALS (Administrative/Medical Necessity) [phone]   [address] (Maternity/NICU/Pediatrics) [phone]   [address] [phone]   [address] [phone]   [address] [place] [address] [address] [phone]   [address] [place] [facility] 134 815 [facility] [phone]

## 2023-03-23 NOTE — CASE MANAGEMENT
Case Management Progress Note    Patient name Willard Mejia  Location [address] [id number] MRN [id number]  : 1955 Date 3/23/2023       LOS (days): 0  Geometric Mean LOS (GMLOS) (days):   Days to GMLOS:        OBJECTIVE:        Current admission status: Observation  Preferred Pharmacy:   [facility], [address]  [address]  [address]  Phone: [phone] Fax: [phone]    Primary Care Provider: Richie Lopez MD    Primary Insurance: [facility]  Secondary Insurance:     PROGRESS NOTE:  CM screen, spoke with Pt's nurse([place]), Pt is independent and no needs anticipated for discharge

## 2023-03-23 NOTE — PLAN OF CARE
Problem: PAIN - ADULT  Goal: Verbalizes/displays adequate comfort level or baseline comfort level  Description: Interventions:  - Encourage patient to monitor pain and request assistance  - Assess pain using appropriate pain scale  - Administer analgesics based on type and severity of pain and evaluate response  - Implement non-pharmacological measures as appropriate and evaluate response  - Consider cultural and social influences on pain and pain management  - Notify physician/advanced practitioner if interventions unsuccessful or patient reports new pain  Outcome: Progressing     Problem: INFECTION - ADULT  Goal: Absence or prevention of progression during hospitalization  Description: INTERVENTIONS:  - Assess and monitor for signs and symptoms of infection  - Monitor lab/diagnostic results  - Monitor all insertion sites, i e  indwelling lines, tubes, and drains  - Monitor endotracheal if appropriate and nasal secretions for changes in amount and color  - [place] appropriate cooling/warming therapies per order  - Administer medications as ordered  - Instruct and encourage patient and family to use good hand hygiene technique  - Identify and instruct in appropriate isolation precautions for identified infection/condition  Outcome: Progressing  Goal: Absence of fever/infection during neutropenic period  Description: INTERVENTIONS:  - Monitor WBC    Outcome: Progressing     Problem: SAFETY ADULT  Goal: Patient will remain free of falls  Description: INTERVENTIONS:  - Educate patient/family on patient safety including physical limitations  - Instruct patient to call for assistance with activity   - Consult OT/PT to assist with strengthening/mobility   - Keep Call bell within reach  - Keep bed low and locked with side rails adjusted as appropriate  - Keep care items and personal belongings within reach  - Initiate and maintain comfort rounds  - Make Fall Risk Sign visible to staff  - Offer Toileting every x Hours, in advance of need  - Initiate/Maintain xalarm  - Obtain necessary fall risk management equipment: x  - Apply yellow socks and bracelet for high fall risk patients  - Consider moving patient to room near nurses station  Outcome: Progressing  Goal: Maintain or return to baseline ADL function  Description: INTERVENTIONS:  -  Assess patient's ability to carry out ADLs; assess patient's baseline for ADL function and identify physical deficits which impact ability to perform ADLs (bathing, care of mouth/teeth, toileting, grooming, dressing, etc )  - Assess/evaluate cause of self-care deficits   - Assess range of motion  - Assess patient's mobility; develop plan if impaired  - Assess patient's need for assistive devices and provide as appropriate  - Encourage maximum independence but intervene and supervise when necessary  - Involve family in performance of ADLs  - Assess for home care needs following discharge   - Consider OT consult to assist with ADL evaluation and planning for discharge  - Provide patient education as appropriate  Outcome: Progressing  Goal: Maintains/Returns to pre admission functional level  Description: INTERVENTIONS:  - Perform BMAT or MOVE assessment daily    - Set and communicate daily mobility goal to care team and patient/family/caregiver  - Collaborate with rehabilitation services on mobility goals if consulted  - Perform Range of Motion x times a day  - Reposition patient every x hours    - Dangle patient x times a day  - Stand patient x times a day  - Ambulate patient x times a day  - Out of bed to chair xxxx times a day   - Out of bed for meals x times a day  - Out of bed for toileting  - Record patient progress and toleration of activity level   Outcome: Progressing     Problem: DISCHARGE PLANNING  Goal: Discharge to home or other facility with appropriate resources  Description: INTERVENTIONS:  - Identify barriers to discharge w/patient and caregiver  - Arrange for needed discharge resources and transportation as appropriate  - Identify discharge learning needs (meds, wound care, etc )  - Arrange for interpretive services to assist at discharge as needed  - Refer to Case Management Department for coordinating discharge planning if the patient needs post-hospital services based on physician/advanced practitioner order or complex needs related to functional status, cognitive ability, or social support system  Outcome: Progressing     Problem: Knowledge Deficit  Goal: Patient/family/caregiver demonstrates understanding of disease process, treatment plan, medications, and discharge instructions  Description: Complete learning assessment and assess knowledge base    Interventions:  - Provide teaching at level of understanding  - Provide teaching via preferred learning methods  Outcome: Progressing     Problem: NEUROSENSORY - ADULT  Goal: Achieves stable or improved neurological status  Description: INTERVENTIONS  - Monitor and report changes in neurological status  - Monitor vital signs such as temperature, blood pressure, glucose, and any other labs ordered   - Initiate measures to prevent increased intracranial pressure  - Monitor for seizure activity and implement precautions if appropriate      Outcome: Progressing  Goal: Remains free of injury related to seizures activity  Description: INTERVENTIONS  - Maintain airway, patient safety  and administer oxygen as ordered  - Monitor patient for seizure activity, document and report duration and description of seizure to physician/advanced practitioner  - If seizure occurs,  ensure patient safety during seizure  - Reorient patient post seizure  - Seizure pads on all 4 side rails  - Instruct patient/family to notify RN of any seizure activity including if an aura is experienced  - Instruct patient/family to call for assistance with activity based on nursing assessment  - Administer anti-seizure medications if ordered    Outcome: Progressing  Goal: Achieves maximal functionality and self care  Description: INTERVENTIONS  - Monitor swallowing and airway patency with patient fatigue and changes in neurological status  - Encourage and assist patient to increase activity and self care     - Encourage visually impaired, hearing impaired and aphasic patients to use assistive/communication devices  Outcome: Progressing   x

## 2023-03-23 NOTE — ASSESSMENT & PLAN NOTE
Presented with sudden onset headache with associated left temporal vision loss  Attempted Tylenol without relief  · Vital signs stable-patient is hypertensive, although also reports headache  · On exam, no neuro deficits  Vision disturbance has resolved  W/ R sided shoulder weakness from rotator cuff surgery, currently undergoing PT  · Stroke alert in the ED  · CT head-no acute intracranial normality  · CTA head/neck -  no LVO or other abnormality  · Chart & imaging reviewed by Neuro, low suspicion for CVA however was reported to have bilateral temporal vision loss  She is now reporting only left eye temporal loss (now resolved), which is more concerning for CVA/amaurosis fugax  · Risk factors for CVA include: HTN, HLD, T2DM, DESTINEY, family hx of strokes in 3 siblings  · Eye pressures were checked: R eye - 13  L eye - 14    · Admit under modified stroke pathway:  · MRI brain WNL  · lipid panel and A1c WNL  · Continue ASA 81mg daily  · Does not require permissive HTN at this point  · Will give fioricet x 1 for headache and reevaluate BP once pain is controlled  · Telemetry  · Does not need PT/OT/ST consult  · Echo WNL  · Temporal u/s negative for arteritis    · F/u with neuro outpt  · F/u with ophthalmology outpt

## 2023-03-23 NOTE — ASSESSMENT & PLAN NOTE
Lab Results   Component Value Date    HGBA1C 6 5 (H) 03/22/2023       Recent Labs     03/22/23  1600 03/22/23  2213   POCGLU 148* 168*       Blood Sugar Average: Last 72 hrs:  (P) 158     -euglycemic goals, management per primary team

## 2023-03-23 NOTE — PLAN OF CARE
Problem: PAIN - ADULT  Goal: Verbalizes/displays adequate comfort level or baseline comfort level  Description: Interventions:  - Encourage patient to monitor pain and request assistance  - Assess pain using appropriate pain scale  - Administer analgesics based on type and severity of pain and evaluate response  - Implement non-pharmacological measures as appropriate and evaluate response  - Consider cultural and social influences on pain and pain management  - Notify physician/advanced practitioner if interventions unsuccessful or patient reports new pain  Outcome: Progressing     Problem: INFECTION - ADULT  Goal: Absence or prevention of progression during hospitalization  Description: INTERVENTIONS:  - Assess and monitor for signs and symptoms of infection  - Monitor lab/diagnostic results  - Monitor all insertion sites, i e  indwelling lines, tubes, and drains  - Monitor endotracheal if appropriate and nasal secretions for changes in amount and color  - [place] appropriate cooling/warming therapies per order  - Administer medications as ordered  - Instruct and encourage patient and family to use good hand hygiene technique  - Identify and instruct in appropriate isolation precautions for identified infection/condition  Outcome: Progressing  Goal: Absence of fever/infection during neutropenic period  Description: INTERVENTIONS:  - Monitor WBC    Outcome: Progressing     Problem: SAFETY ADULT  Goal: Patient will remain free of falls  Description: INTERVENTIONS:  - Educate patient/family on patient safety including physical limitations  - Instruct patient to call for assistance with activity   - Consult OT/PT to assist with strengthening/mobility   - Keep Call bell within reach  - Keep bed low and locked with side rails adjusted as appropriate  - Keep care items and personal belongings within reach  - Initiate and maintain comfort rounds  - Make Fall Risk Sign visible to staff  - Offer Toileting every x Hours, in advance of need  - Initiate/Maintain xalarm  - Obtain necessary fall risk management equipment: x  - Apply yellow socks and bracelet for high fall risk patients  - Consider moving patient to room near nurses station  Outcome: Progressing  Goal: Maintain or return to baseline ADL function  Description: INTERVENTIONS:  -  Assess patient's ability to carry out ADLs; assess patient's baseline for ADL function and identify physical deficits which impact ability to perform ADLs (bathing, care of mouth/teeth, toileting, grooming, dressing, etc )  - Assess/evaluate cause of self-care deficits   - Assess range of motion  - Assess patient's mobility; develop plan if impaired  - Assess patient's need for assistive devices and provide as appropriate  - Encourage maximum independence but intervene and supervise when necessary  - Involve family in performance of ADLs  - Assess for home care needs following discharge   - Consider OT consult to assist with ADL evaluation and planning for discharge  - Provide patient education as appropriate  Outcome: Progressing  Goal: Maintains/Returns to pre admission functional level  Description: INTERVENTIONS:  - Perform BMAT or MOVE assessment daily    - Set and communicate daily mobility goal to care team and patient/family/caregiver  - Collaborate with rehabilitation services on mobility goals if consulted  - Perform Range of Motion x times a day  - Reposition patient every x hours    - Dangle patient x times a day  - Stand patient x times a day  - Ambulate patient x times a day  - Out of bed to chair x times a day   - Out of bed for meals x times a day  - Out of bed for toileting  - Record patient progress and toleration of activity level   Outcome: Progressing     Problem: DISCHARGE PLANNING  Goal: Discharge to home or other facility with appropriate resources  Description: INTERVENTIONS:  - Identify barriers to discharge w/patient and caregiver  - Arrange for needed discharge resources and transportation as appropriate  - Identify discharge learning needs (meds, wound care, etc )  - Arrange for interpretive services to assist at discharge as needed  - Refer to Case Management Department for coordinating discharge planning if the patient needs post-hospital services based on physician/advanced practitioner order or complex needs related to functional status, cognitive ability, or social support system  Outcome: Progressing     Problem: Knowledge Deficit  Goal: Patient/family/caregiver demonstrates understanding of disease process, treatment plan, medications, and discharge instructions  Description: Complete learning assessment and assess knowledge base    Interventions:  - Provide teaching at level of understanding  - Provide teaching via preferred learning methods  Outcome: Progressing     Problem: NEUROSENSORY - ADULT  Goal: Achieves stable or improved neurological status  Description: INTERVENTIONS  - Monitor and report changes in neurological status  - Monitor vital signs such as temperature, blood pressure, glucose, and any other labs ordered   - Initiate measures to prevent increased intracranial pressure  - Monitor for seizure activity and implement precautions if appropriate      Outcome: Progressing  Goal: Remains free of injury related to seizures activity  Description: INTERVENTIONS  - Maintain airway, patient safety  and administer oxygen as ordered  - Monitor patient for seizure activity, document and report duration and description of seizure to physician/advanced practitioner  - If seizure occurs,  ensure patient safety during seizure  - Reorient patient post seizure  - Seizure pads on all 4 side rails  - Instruct patient/family to notify RN of any seizure activity including if an aura is experienced  - Instruct patient/family to call for assistance with activity based on nursing assessment  - Administer anti-seizure medications if ordered    Outcome: Progressing  Goal: Achieves maximal functionality and self care  Description: INTERVENTIONS  - Monitor swallowing and airway patency with patient fatigue and changes in neurological status  - Encourage and assist patient to increase activity and self care  - Encourage visually impaired, hearing impaired and aphasic patients to use assistive/communication devices  Outcome: Progressing     Problem: Neurological Deficit  Goal: Neurological status is stable or improving  Description: Interventions:  - Monitor and assess patient's level of consciousness, motor function, sensory function, and level of assistance needed for ADLs  - Monitor and report changes from baseline  Collaborate with interdisciplinary team to initiate plan and implement interventions as ordered  - Provide and maintain a safe environment  - Consider seizure precautions  - Consider fall precautions  - Consider aspiration precautions  - Consider bleeding precautions  Outcome: Progressing     Problem: Activity Intolerance/Impaired Mobility  Goal: Mobility/activity is maintained at optimum level for patient  Description: Interventions:  - Assess and monitor patient  barriers to mobility and need for assistive/adaptive devices  - Assess patient's emotional response to limitations  - Collaborate with interdisciplinary team and initiate plans and interventions as ordered  - Encourage independent activity per ability   - Maintain proper body alignment  - Perform active/passive rom as tolerated/ordered  - Plan activities to conserve energy   - Turn patient as appropriate  Outcome: Progressing     Problem: Communication Impairment  Goal: Ability to express needs and understand communication  Description: Assess patient's communication skills and ability to understand information  Patient will demonstrate use of effective communication techniques, alternative methods of communication and understanding even if not able to speak       - Encourage communication and provide alternate methods of communication as needed  - Collaborate with case management/ for discharge needs  - Include patient/family/caregiver in decisions related to communication  Outcome: Progressing     Problem: Potential for Aspiration  Goal: Non-ventilated patient's risk of aspiration is minimized  Description: Assess and monitor vital signs, respiratory status, and labs (WBC)  Monitor for signs of aspiration (tachypnea, cough, rales, wheezing, cyanosis, fever)  - Assess and monitor patient's ability to swallow  - Place patient up in chair to eat if possible  - HOB up at 90 degrees to eat if unable to get patient up into chair   - Supervise patient during oral intake  - Instruct patient/ family to take small bites  - Instruct patient/ family to take small single sips when taking liquids  - Follow patient-specific strategies generated by speech pathologist   Outcome: Progressing     Problem: Nutrition  Goal: Nutrition/Hydration status is improving  Description: Monitor and assess patient's nutrition/hydration status for malnutrition (ex- brittle hair, bruises, dry skin, pale skin and conjunctiva, muscle wasting, smooth red tongue, and disorientation)  Collaborate with interdisciplinary team and initiate plan and interventions as ordered  Monitor patient's weight and dietary intake as ordered or per policy  Utilize nutrition screening tool and intervene per policy  Determine patient's food preferences and provide high-protein, high-caloric foods as appropriate  - Assist patient with eating   - Allow adequate time for meals   - Encourage patient to take dietary supplement as ordered  - Collaborate with clinical nutritionist   - Include patient/family/caregiver in decisions related to nutrition    Outcome: Progressing     Problem: CARDIOVASCULAR - ADULT  Goal: Maintains optimal cardiac output and hemodynamic stability  Description: INTERVENTIONS:  - Monitor I/O, vital signs and rhythm  - Monitor for S/S and trends of decreased cardiac output  - Administer and titrate ordered vasoactive medications to optimize hemodynamic stability  - Assess quality of pulses, skin color and temperature  - Assess for signs of decreased coronary artery perfusion  - Instruct patient to report change in severity of symptoms  Outcome: Progressing  Goal: Absence of cardiac dysrhythmias or at baseline rhythm  Description: INTERVENTIONS:  - Continuous cardiac monitoring, vital signs, obtain 12 lead EKG if ordered  - Administer antiarrhythmic and heart rate control medications as ordered  - Monitor electrolytes and administer replacement therapy as ordered  Outcome: Progressing     Problem: METABOLIC, FLUID AND ELECTROLYTES - ADULT  Goal: Electrolytes maintained within normal limits  Description: INTERVENTIONS:  - Monitor labs and assess patient for signs and symptoms of electrolyte imbalances  - Administer electrolyte replacement as ordered  - Monitor response to electrolyte replacements, including repeat lab results as appropriate  - Instruct patient on fluid and nutrition as appropriate  Outcome: Progressing  Goal: Fluid balance maintained  Description: INTERVENTIONS:  - Monitor labs   - Monitor I/O and WT  - Instruct patient on fluid and nutrition as appropriate  - Assess for signs & symptoms of volume excess or deficit  Outcome: Progressing     Problem: SKIN/TISSUE INTEGRITY - ADULT  Goal: Skin Integrity remains intact(Skin Breakdown Prevention)  Description: Assess:  -Perform Javi assessment every x  -Clean and moisturize skin every x  -Inspect skin when repositioning, toileting, and assisting with ADLS  -Assess under medical devices such as x every x  -Assess extremities for adequate circulation and sensation     Bed Management:  -Have minimal linens on bed & keep smooth, unwrinkled  -Change linens as needed when moist or perspiring  -Avoid sitting or lying in one position for more than x hours while in bed  -Keep HOB at [facility]     Toileting:  -Offer bedside commode  -Assess for incontinence every x  -Use incontinent care products after each incontinent episode such as x    Activity:  -Mobilize patient x times a day  -Encourage activity and walks on unit  -Encourage or provide ROM exercises   -Turn and reposition patient every x Hours  -Use appropriate equipment to lift or move patient in bed  -Instruct/ Assist with weight shifting every x when out of bed in chair  -Consider limitation of chair time x hour intervals    Skin Care:  -Avoid use of baby powder, tape, friction and shearing, hot water or constrictive clothing  -Relieve pressure over bony prominences using x  -Do not massage red bony areas    Next Steps:  -Teach patient strategies to minimize risks such as x   -Consider consults to  interdisciplinary teams such as x  Outcome: Progressing     Problem: HEMATOLOGIC - ADULT  Goal: Maintains hematologic stability  Description: INTERVENTIONS  - Assess for signs and symptoms of bleeding or hemorrhage  - Monitor labs  - Administer supportive blood products/factors as ordered and appropriate  Outcome: Progressing     Problem: MUSCULOSKELETAL - ADULT  Goal: Maintain or return mobility to safest level of function  Description: INTERVENTIONS:  - Assess patient's ability to carry out ADLs; assess patient's baseline for ADL function and identify physical deficits which impact ability to perform ADLs (bathing, care of mouth/teeth, toileting, grooming, dressing, etc )  - Assess/evaluate cause of self-care deficits   - Assess range of motion  - Assess patient's mobility  - Assess patient's need for assistive devices and provide as appropriate  - Encourage maximum independence but intervene and supervise when necessary  - Involve family in performance of ADLs  - Assess for home care needs following discharge   - Consider OT consult to assist with ADL evaluation and planning for discharge  - Provide patient education as appropriate  Outcome: Progressing     Problem: Nutrition/Hydration-ADULT  Goal: Nutrient/Hydration intake appropriate for improving, restoring or maintaining nutritional needs  Description: Monitor and assess patient's nutrition/hydration status for malnutrition  Collaborate with interdisciplinary team and initiate plan and interventions as ordered  Monitor patient's weight and dietary intake as ordered or per policy  Utilize nutrition screening tool and intervene as necessary  Determine patient's food preferences and provide high-protein, high-caloric foods as appropriate       INTERVENTIONS:  - Monitor oral intake, urinary output, labs, and treatment plans  - Assess nutrition and hydration status and recommend course of action  - Evaluate amount of meals eaten  - Assist patient with eating if necessary   - Allow adequate time for meals  - Recommend/ encourage appropriate diets, oral nutritional supplements, and vitamin/mineral supplements  - Order, calculate, and assess calorie counts as needed  - Recommend, monitor, and adjust tube feedings and TPN/PPN based on assessed needs  - Assess need for intravenous fluids  - Provide specific nutrition/hydration education as appropriate  - Include patient/family/caregiver in decisions related to nutrition  Outcome: Progressing     Problem: Prexisting or High Potential for Compromised Skin Integrity  Goal: Skin integrity is maintained or improved  Description: INTERVENTIONS:  - Identify patients at risk for skin breakdown  - Assess and monitor skin integrity  - Assess and monitor nutrition and hydration status  - Monitor labs   - Assess for incontinence   - Turn and reposition patient  - Assist with mobility/ambulation  - Relieve pressure over bony prominences  - Avoid friction and shearing  - Provide appropriate hygiene as needed including keeping skin clean and dry  - Evaluate need for skin moisturizer/barrier cream  - Collaborate with interdisciplinary team   - Patient/family teaching  - Consider wound care consult   Outcome: Progressing     Problem: MOBILITY - ADULT  Goal: Maintain or return to baseline ADL function  Description: INTERVENTIONS:  -  Assess patient's ability to carry out ADLs; assess patient's baseline for ADL function and identify physical deficits which impact ability to perform ADLs (bathing, care of mouth/teeth, toileting, grooming, dressing, etc )  - Assess/evaluate cause of self-care deficits   - Assess range of motion  - Assess patient's mobility; develop plan if impaired  - Assess patient's need for assistive devices and provide as appropriate  - Encourage maximum independence but intervene and supervise when necessary  - Involve family in performance of ADLs  - Assess for home care needs following discharge   - Consider OT consult to assist with ADL evaluation and planning for discharge  - Provide patient education as appropriate  Outcome: Progressing  Goal: Maintains/Returns to pre admission functional level  Description: INTERVENTIONS:  - Perform BMAT or MOVE assessment daily    - Set and communicate daily mobility goal to care team and patient/family/caregiver  - Collaborate with rehabilitation services on mobility goals if consulted  - Perform Range of Motion x times a day  - Reposition patient every x hours    - Dangle patient xxxxx times a day  - Stand patient x times a day  - Ambulate patient x times a day  - Out of bed to chair x times a day   - Out of bed for meals x times a day  - Out of bed for toileting  - Record patient progress and toleration of activity level   Outcome: Progressing     Problem: Potential for Falls  Goal: Patient will remain free of falls  Description: INTERVENTIONS:  - Educate patient/family on patient safety including physical limitations  - Instruct patient to call for assistance with activity   - Consult OT/PT to assist with strengthening/mobility   - Keep Call bell within reach  - Keep bed low and locked with side rails adjusted as appropriate  - Keep care items and personal belongings within reach  - Initiate and maintain comfort rounds  - Make Fall Risk Sign visible to staff  - Offer Toileting every x Hours, in advance of need  - Initiate/Maintain xalarm  - Obtain necessary fall risk management equipment: x  - Apply yellow socks and bracelet for high fall risk patients  - Consider moving patient to room near nurses station  Outcome: Progressing

## 2023-03-27 LAB
LEFT EYE DIABETIC RETINOPATHY: NORMAL
RIGHT EYE DIABETIC RETINOPATHY: NORMAL

## 2023-04-03 ENCOUNTER — TELEPHONE (OUTPATIENT)
Dept: NEUROLOGY | Facility: CLINIC | Age: 68
End: 2023-04-03

## 2023-04-03 NOTE — TELEPHONE ENCOUNTER
Patient needs a HFU appointment,she called in today to schedule an appointment    Per Hospital notes:  Na Cornelius will need follow up in in 6 weeks with headache attending or advance practitioner  She will not require outpatient neurological testing  HFU/SLUB 3-22-23 to 3-23-23/Visual Disturbance      I offered her 5-2-23 at 1230 pm with Dr Edson Mendieta and she accepted

## 2023-04-25 ENCOUNTER — TELEPHONE (OUTPATIENT)
Dept: NEUROLOGY | Facility: CLINIC | Age: 68
End: 2023-04-25

## 2023-04-25 NOTE — TELEPHONE ENCOUNTER
Called patient and left voicemail to confirm their upcoming appointment with Dr Mónica Hernandez  Informed patient about arriving in the [place] location 15 minutes prior to appointment to get checked in and go over chart

## 2023-05-02 ENCOUNTER — OFFICE VISIT (OUTPATIENT)
Dept: NEUROLOGY | Facility: CLINIC | Age: 68
End: 2023-05-02

## 2023-05-02 VITALS
HEIGHT: 65 IN | WEIGHT: 205 LBS | HEART RATE: 77 BPM | BODY MASS INDEX: 34.16 KG/M2 | SYSTOLIC BLOOD PRESSURE: 144 MMHG | DIASTOLIC BLOOD PRESSURE: 79 MMHG

## 2023-05-02 DIAGNOSIS — H53.9 VISUAL DISTURBANCE: ICD-10-CM

## 2023-05-02 DIAGNOSIS — G43.009 MIGRAINE WITHOUT AURA AND WITHOUT STATUS MIGRAINOSUS, NOT INTRACTABLE: Primary | ICD-10-CM

## 2023-05-02 RX ORDER — ACETAMINOPHEN 160 MG
2000 TABLET,DISINTEGRATING ORAL DAILY
COMMUNITY
Start: 2023-02-06

## 2023-05-02 RX ORDER — CETIRIZINE HYDROCHLORIDE 10 MG/1
10 TABLET ORAL DAILY
COMMUNITY

## 2023-05-02 NOTE — PATIENT INSTRUCTIONS
"  Consider repeat sleep study as some of this sounds like suboptimal;ly treated sleep apnea  And continue to pursue the high calcium which could contribute to symptoms as well     Discuss with nephrology whether they would be ok with 1-5 days of steroids if needed to break this in the future  Please call [phone] to schedule and I recommend you see one of the following providers:  Liberty Graft MD Juliaette Habermann MD Helane Rakers MD Nonda Barton CRNP Larita Fruit PA-CONRAD      Headache/migraine treatment:   Rescue medications (for immediate treatment of a headache): It is ok to take ibuprofen, acetaminophen or naproxen (Advil, Tylenol,  Aleve, Excedrin) if they help your headaches you should limit these to No more than 3 times a week to avoid medication overuse/rebound headaches  \"exedrin tension\" - caffeine/tylenol       Over the counter preventive supplements for headaches/migraines (if you try, try for 3 months straight)  (to take every day to help prevent headaches - not to take at the time of headache): There are combo pills online of these - none of which regulated by FDA and double check dosing - take appropriate dose only once a day- preventa migraine, migravent, mind ease, migrelief   [] Magnesium 400mg daily (If any diarrhea or upset stomach, decrease dose  as tolerated)  [] Riboflavin (Vitamin B2) 400mg daily - try online   (FYI B2 may make your urine bright/neon yellow)  AND/OR  [] Herbal medication: Pet[place]/Butterbur 150 mg daily - try online  (When choosing your Butterbur online or in the store, beware that there are some poor preps containing pyrrolizidine alkaloids (PAs) that can be harmful to the liver   Therefore, do not use butterbur products that are not labeled as PA-free )      Prescription preventive medications for headaches/migraines   (to take every day to help prevent headaches - not to take at the time of headache):  [x] we have options if " needed        *Typically these types of medications take time until you see the benefit, although some may see improvement in days, often it may take weeks, especially if the medication is being titrated up to a beneficial level  Please contact us if there are any concerns or questions regarding the medication  Lifestyle Recommendations:  [x] SLEEP - Maintain a regular sleep schedule: Adults need at least 7-8 hours of uninterrupted a night  Maintain good sleep hygiene:  Going to bed and waking up at consistent times, avoiding excessive daytime naps, avoiding caffeinated beverages in the evening, avoid excessive stimulation in the evening and generally using bed primarily for sleeping  One hour before bedtime would recommend turning lights down lower, decreasing your activity (may read quietly, listen to music at a low volume)  When you get into bed, should eliminate all technology (no texting, emailing, playing with your phone, iPad or tablet in bed)  [x] HYDRATION - Maintain good hydration  Drink  2L of fluid a day (4 typical small water bottles)  [x] DIET - Maintain good nutrition  In particular don't skip meals and try and eat healthy balanced meals regularly  [x] TRIGGERS - Look for other triggers and avoid them: Limit caffeine to 1-2 cups a day or less  Avoid dietary triggers that you have noticed bring on your headaches (this could include aged cheese, peanuts, MSG, aspartame and nitrates)  [x] EXERCISE - physical exercise as we all know is good for you in many ways, and not only is good for your heart, but also is beneficial for your mental health, cognitive health and  chronic pain/headaches  I would encourage at the least 5 days of physical exercise weekly for at least 30 minutes  Education and Follow-up  [x] Please call with any questions or concerns  Of course if any new concerning symptoms go to the emergency department    [x] Follow up 6 months, sooner if needed

## 2023-05-02 NOTE — PROGRESS NOTES
Malini Garnica [facility] Consult  PATIENT:  Charu Ramos  MRN:  [id number]  :  1955  DATE OF SERVICE:  2023  REFERRED BY: Ana Serrato MD  PMD: Maura Garza MD    Assessment/Plan:     Charu Ramos is a delightful 75 y o  female with a past medical history that includes hypercalcemia, type 2 diabetes mellitus, thyroid versus parathyroid nodule, DESTINEY on CPAP (), hypertension, postural hypotension, normocytic anemia, hyperlipidemia,  headaches, BMI over 30, visual disturbance, autosomal dominant polycystic kidney disease, kidney stone, recent shoulder surgery referred here for evaluation of headache  My initial evaluation 2023     Migraine without aura and without status migrainosus, not intractable  Visual disturbance  She reports a long history of headaches and migraines dating back to her teens and early 25s that seemed to significantly improve following diagnosis and treatment of DESTINEY in the   Initially, she felt that was the last time she had headaches, but in recollection she recalls she did follow with a neurologist in [place] in the early  and did round round of Botox for chronic migraine  She believes migraines may have improved following menopause  On 3/22/2023, she woke up with a headache and was playing cards when she noticed unilateral peripheral vision loss on the left, unclear if she woke up with that as well, but it persisted and she went to the emergency department where she was admitted for stroke evaluation and found not to have had a stroke  MRI brain was unremarkable for acute pathology, as was CTA head and neck, carotid ultrasound, TTE, and she was already on aspirin which was continued as well as risk factor control  She followed up with eye doctor last month and no pertinent issues were found except for early macular degeneration    She reports the pain was right temporal and therefore ESR and CRP were also obtained which were only slightly elevated and temporal ultrasound was unremarkable as well   -As of 5/2/2023: She reports no further migraines, no further vision changes  We discussed most likely etiology could be either suboptimally treated DESTINEY contributing to exacerbation of migraine or related to the hypercalcemia  She mentions some mild memory issues which are not atypical for age and [facility] today was normal     Workup:  - Neurologic exam was unremarkable for concerning findings, MOCA normal 27/30 (-2 recall, -1 repeat sentence)  -MRI brain pituitary with and without contrast 3/22/2023: 1  Unremarkable exam of the pituitary  No evidence of lesion  2   Mild microangiopathic change  No evidence of acute infarct, intracranial hemorrhage or mass   -Carotid ultrasound 3/22/2023: There is no evidence of giant cell arteritis, aneurysm, or significant stenosis  noted in the superficial temporal artery and its branches  -CTA head and neck with and without contrast 3/22/2023: No significant stenosis of the cervical carotid or vertebral arteries  No significant intracranial stenosis, large vessel occlusion or aneurysm      Preventative:  - we discussed headache hygiene and lifestyle factors that may improve headaches  -Prescription preventative not indicated at this time  - Currently on through other providers: Losartan/Cozaar 50 mg daily, vitamin D 2000 units daily, aspirin 81 mg daily  - Past/ failed/contraindicated: Vasotec  - future options: Topiramate although with caution due to history of kidney stones, verapamil, CGRP med, botox    Rescue:  - recommend not taking over-the-counter or prescription pain medications more than 3 days per week to prevent medication overuse/rebound headache  - Prescription abortive not indicated at this time, we discussed most likely thing to help for prolonged episode in the future would be steroids if nephrology would be agreeable for short course  - Currently on through other providers: "OTC meds  - Past/ failed/contraindicated: Fioricet  - future options:  Triptan, prochlorperazine, Toradol IM or p o , could consider trial of 5 days of Depakote 500 mg nightly or dexamethasone 2 mg daily for prolonged migraine, ubrelvy, reyvow, nurtec      Patient instructions        Consider repeat sleep study as some of this sounds like suboptimally treated sleep apnea  And continue to pursue the high calcium which could contribute to symptoms as well     Discuss with nephrology whether they would be ok with 1-5 days of steroids if needed to break this in the future  Please call [phone] to schedule and I recommend you see one of the following providers:  Phil Russell PA-C      Headache/migraine treatment:   Rescue medications (for immediate treatment of a headache): It is ok to take ibuprofen, acetaminophen or naproxen (Advil, Tylenol,  Aleve, Excedrin) if they help your headaches you should limit these to No more than 3 times a week to avoid medication overuse/rebound headaches  \"exedrin tension\" - caffeine/tylenol       Over the counter preventive supplements for headaches/migraines (if you try, try for 3 months straight)  (to take every day to help prevent headaches - not to take at the time of headache):   There are combo pills online of these - none of which regulated by FDA and double check dosing - take appropriate dose only once a day- preventa migraine, migravent, mind ease, migrelief   [] Magnesium 400mg daily (If any diarrhea or upset stomach, decrease dose  as tolerated)  [] Riboflavin (Vitamin B2) 400mg daily - try online   (FYI B2 may make your urine bright/neon yellow)  AND/OR  [] Herbal medication: Petasites/Butterbur 150 mg daily - try online  (When choosing your Butterbur online or in the store, beware that there are some poor preps containing pyrrolizidine alkaloids (PAs) that can " be harmful to the liver  Therefore, do not use butterbur products that are not labeled as PA-free )      Prescription preventive medications for headaches/migraines   (to take every day to help prevent headaches - not to take at the time of headache):  [x] we have options if needed        *Typically these types of medications take time until you see the benefit, although some may see improvement in days, often it may take weeks, especially if the medication is being titrated up to a beneficial level  Please contact us if there are any concerns or questions regarding the medication  Lifestyle Recommendations:  [x] SLEEP - Maintain a regular sleep schedule: Adults need at least 7-8 hours of uninterrupted a night  Maintain good sleep hygiene:  Going to bed and waking up at consistent times, avoiding excessive daytime naps, avoiding caffeinated beverages in the evening, avoid excessive stimulation in the evening and generally using bed primarily for sleeping  One hour before bedtime would recommend turning lights down lower, decreasing your activity (may read quietly, listen to music at a low volume)  When you get into bed, should eliminate all technology (no texting, emailing, playing with your phone, iPad or tablet in bed)  [x] HYDRATION - Maintain good hydration  Drink  2L of fluid a day (4 typical small water bottles)  [x] DIET - Maintain good nutrition  In particular don't skip meals and try and eat healthy balanced meals regularly  [x] TRIGGERS - Look for other triggers and avoid them: Limit caffeine to 1-2 cups a day or less  Avoid dietary triggers that you have noticed bring on your headaches (this could include aged cheese, peanuts, MSG, aspartame and nitrates)  [x] EXERCISE - physical exercise as we all know is good for you in many ways, and not only is good for your heart, but also is beneficial for your mental health, cognitive health and  chronic pain/headaches   I would encourage at the least 5 days of physical exercise weekly for at least 30 minutes  Education and Follow-up  [x] Please call with any questions or concerns  Of course if any new concerning symptoms go to the emergency department  [x] Follow up 6 months, sooner if needed       CC: We had the pleasure of evaluating Petrona Hassan in neurological consultation today  Petrona Hassan is a   right handed female who presents today for evaluation of headaches  History obtained from patient as well as available medical record review  History of Present Illness:   Current medical illnesses  or past medical history include type 2 diabetes mellitus, thyroid versus parathyroid nodule, DESTINEY on CPAP (1990s) , hypertension, postural hypotension, hyperlipidemia, hypercalcemia, headaches, BMI over 30, visual disturbance, autosomal dominant polycystic kidney disease, kidney stone, recent shoulder surgery    Admitted 3/22/2023 to 3/23/23  -She presented to the emergency department with headache and was playing cards and noticed peripheral vision loss on the left side was gone (unsure when started and lasted into the night) with a right temporal with the left peripheral vision loss  Woke up with it, tried tylenol without relief, went to /76, NIHSS 1, MRI Brain, CTA h/N, carotid US, mildly elevated CRP, ESR and temporal US unremarakable, A1c 6 5, TTE ok 65%, migraine resolved the next am  Migraine cocktail did not help in hospital    -MRI brain pituitary with and without contrast 3/22/2023: 1  Unremarkable exam of the pituitary  No evidence of lesion  2   Mild microangiopathic change  No evidence of acute infarct, intracranial hemorrhage or mass   -Carotid ultrasound 3/22/2023: There is no evidence of giant cell arteritis, aneurysm, or significant stenosis  noted in the superficial temporal artery and its branches  -CTA head and neck with and without contrast 3/22/2023: No significant stenosis of the cervical carotid or vertebral arteries   No significant intracranial stenosis, large vessel occlusion or aneurysm     -Further episodes since hospitalization and blood pressure runs 130s over 60s to 70s    Memory  Occasionally repeating things that were already said  Forgetfulness with phone, keys, wallet  No family history of dementia   No driving safety issues       Headaches started at what age? Teens, early 25s   How often do the headaches occur?   -Has a history of headaches in the past that seemed to resolve since using CPAP 1990s  - not had headaches in a long time since likely 1990s she had thought but on second thought did see someone in [place] early 2000s and did one round of botox  - as of 5/2/2023: no further episodes     Aura? Without      Last eye exam: in April 2023 saw retina specialist and no issues   Early mac degeneration     Where is your headache located and pain quality?   - right temple dull pain constant     What is the intensity of pain? Average: 8/10, worst likely 10 a long time ago/10  Associated symptoms:   [] Nausea       [] Vomiting         [] Photophobia     []Phonophobia      [] Osmophobia  [] Blurred vision - left side unsure if just left eye or both  [] Light-headed or dizzy   - infrequent, mild   [] Tinnitus   [] Hands or feet tingle or feel numb/paresthesias    [] Ptosis      [] Facial droop  [] Lacrimation  [] Nasal congestion/rhinorrhea        Have you seen someone else for headaches or pain? Yes, PCP, neurology   Have you had trigger point injection performed and how often? No  Have you had Botox injection performed and how often? Yes - once on the forehead  Have you had epidural injections or transforaminal injections performed? no  Are you current pregnant or planning on getting pregnant? Menopause unknown due to partial hysterectomy   Have you ever had any Brain imaging? yes    What medications do you take or have you taken for your headaches? ABORTIVE/pertinent p r n   Meds:      Not much     Past fioricet    PREVENTIVE/pertinent daily meds:       Losartan/Cozaar 50 mg - years   Vitamin D 2000 units daily  Aspirin 81 mg daily    Past/ failed/contraindicated:  vasotec       LIFESTYLE  Sleep   - averages: 7-8 hours and naps       The following portions of the patient's history were reviewed and updated as appropriate: allergies, current medications, past family history, past medical history, past social history, past surgical history and problem list     Pertinent family history:  Family history of headaches:  migraine headaches in mother, sister     Pertinent social history:  Work: retired  Education: college, [employer]  Lives with       Past Medical History:     Past Medical History:   Diagnosis Date    Arthritis     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     Diabetes mellitus [facility]     GERD (gastroesophageal reflux disease)     Renal disorder     Sleep apnea        Patient Active Problem List   Diagnosis    Essential hypertension    Hyperlipidemia    Type 2 diabetes mellitus without complication, without long-term current use of insulin [facility]    Hypercalcemia    Thyroid nodule    Morbid obesity [facility]    DESTINEY on CPAP    Visual disturbance       Medications:      Current Outpatient Medications   Medication Sig Dispense Refill    aspirin 81 mg chewable tablet Chew 1 tablet (81 mg total) daily Do not start before March 24, 2023  30 tablet 0    cetirizine (ZyrTEC) 10 mg tablet Take 10 mg by mouth daily      cholecalciferol (VITAMIN D3) 1,000 units tablet Take 2,000 Units by mouth daily      Cholecalciferol (Vitamin D3) 50 MCG (2000 UT) capsule Take 2,000 Units by mouth daily      losartan (COZAAR) 50 mg tablet TAKE 1 TABLET BY MOUTH EVERY DAY 90 tablet 1    metFORMIN (GLUCOPHAGE) 1000 MG tablet TAKE 1 TABLET BY MOUTH TWICE A DAY WITH MEALS 180 tablet 3    pantoprazole (PROTONIX) 40 mg tablet TAKE 1 TABLET BY MOUTH EVERY DAY 90 tablet 5    simvastatin (ZOCOR) 40 mg tablet Take 40 mg by mouth every evening       No current facility-administered medications for this visit  Allergies:       Allergies   Allergen Reactions    Demerol [Meperidine] GI Intolerance     Other reaction(s): N/V    Morphine GI Intolerance     Other reaction(s): N/V    Meloxicam Swelling       Family History:     Family History   Problem Relation Age of Onset   Alexandro Boeck COPD Mother     Lung cancer Mother    Alexandro Boeck Lung cancer Father     Hypertension Father     No Known Problems Sister     Hypertension Brother     Colon polyps Brother     Kidney disease Brother     Colon polyps Brother     Stroke Brother     Kidney disease Brother     Hypertension Brother     Hyperlipidemia Brother     Gestational diabetes Sister     Colon cancer Maternal Aunt        Social History:       Social History     Socioeconomic History    Marital status: /Civil Union     Spouse name: Not on file    Number of children: Not on file    Years of education: Not on file    Highest education level: Not on file   Occupational History    Not on file   Tobacco Use    Smoking status: Former     Packs/day: 0 50     Types: Cigarettes     Quit date: 10/29/1981     Years since quittin 5    Smokeless tobacco: Never   Vaping Use    Vaping Use: Never used   Substance and Sexual Activity    Alcohol use: Never    Drug use: Not Currently    Sexual activity: Not on file   Other Topics Concern    Not on file   Social History Narrative    Not on file     Social Determinants of Health     Financial Resource Strain: Not on file   Food Insecurity: Not on file   Transportation Needs: Not on file   Physical Activity: Not on file   Stress: Not on file   Social Connections: Not on file   Intimate Partner Violence: Not on file   Housing Stability: Not on file         Objective:       Physical Exam:                                                                 Vitals:            Constitutional:    /79 (BP Location: Right arm, Patient "Position: Sitting, Cuff Size: Standard)   Pulse 77   Ht 5' 5\" (1 651 m)   Wt 93 kg (205 lb)   BMI 34 11 kg/m²   BP Readings from Last 3 Encounters:   05/02/23 144/79   03/23/23 124/59   10/03/22 124/58     Pulse Readings from Last 3 Encounters:   05/02/23 77   03/23/23 71   11/16/21 78         Well developed, well nourished, well groomed  No dysmorphic features  HEENT:  Normocephalic atraumatic  Oropharynx appears clear and moist  No oral mucosal lesions noted  Chest:  Respirations appear regular and unlabored  Cardiovascular:  Distal extremities warm without palpable edema or tenderness, no observed significant swelling  Musculoskeletal:  (see below under neurologic exam for evaluation of motor function and gait)   Skin:  warm and dry  No apparent birthmarks or stigmata of neurocutaneous disease  Psychiatric:  Normal behavior and appropriate affect        Neurological Examination:     Mental status/cognitive function:   Recent and remote memory intact  Attention span and concentration as well as fund of knowledge are appropriate for age  Normal language and spontaneous speech       Mental Status:      MOCA total = 27/30  Visual spatial/executive:    Trails (1)  [x]   copy cube (1)  [x]   draw clock - \"ten past 11\" (3)   Contour  [x]  Numbers  [x]  Hands  [x]     Naming:   Drawings (3)  Lion/Camel  [x] Rhino/Bear  [x] Camel/Rhino  [x]     Memory/Delayed Recall:  (5)   Word recall Immediate  Memory  (no points) Delayed  (5 pts) Category Clue Multiple choice   Face  [x]   [x]  Part of the Body    []  Nose, Face, hand             []    Velvet  []   [x]  Type of Fabric       []  [place]amar, [address] [facility], [place]      []    [address]  [address]  [place]  [x]   [x]  Type of Building    []  [address] [facility] [address]  []    Beth  [x]   []  Type of Flower      [x]  Beth Diaz Tulip            []    Red  [x]   []  A Color                  [x]  Red, Roxanna Garre              []      Attention:   Digits (2)  Forwards: 43[address]  [x] " Backwards: 742  [x]     Llist of letters, tap for A (1) [x]   F B A C M N A A J K L B A F A K D E A A A J A M O F A A B     Serial 7s (3 pts 4-5 correct, 2pts 2-3 correct, 1 pt for 1 correct)   [x] 93,  [x] 86,  [x] 79,  [x] 72,  [x] 65    Language:   Repeat sentences (2)  I only know that Starla Elmore is the one to help today  [x]   The cat always hid under the couch when dogs were in the room  []     Number of words beginning with letter F  (>11 in 1 min) (1)  [x]     Abstraction:  (2)  train-bicycle (means of transportation, means of travelling, you take trips in both) [x]   watch-ruler (measuring instruments, used to measure)  [x]     Orientation:  (6)  Year [x]  Date [x]  Day [x]  Month [x]  Place [x]  City [x]         Cranial Nerves:  II-visual fields full  Fundi poorly visualized due to pupillary constriction  III, IV, VI-Pupils were equal, round, and reactive to light and accommodation  Extraocular movements were full and conjugate without nystagmus  V-facial sensation symmetric  VII-facial expression symmetric, intact forehead wrinkle, strong eye closure, symmetric smile    VIII-hearing grossly intact bilaterally   IX, X-palate elevation symmetric, no dysarthria  XI-shoulder shrug strength intact    XII-tongue protrusion midline  Motor Exam: symmetric bulk and tone throughout, no pronator drift  Power/strength 5/5 bilateral upper and lower extremities, no atrophy, fasciculations or abnormal movements noted  Sensory: grossly intact light touch in all extremities  Reflexes: brachioradialis 2+, biceps 2+, knee 2+, ankle 2+ bilaterally  No ankle clonus   Coordination: Finger nose finger intact bilaterally, no apparent dysmetria, ataxia or tremor noted  Gait: steady casual and tandem gait         Pertinent lab results:   -3/22/2023 CBC significant for hemoglobin 10 4 MCV 92  BMP unremarkable  LDL 52  ESR 41  CRP 11 7  A1c 6 5  10/27/2022 CMP remarkable for glucose 126, calcium 10 6  9/22/2021 vitamin D  TSH normal  PTH 32 with calcium of 10 4     Imaging: I have personally reviewed imaging and radiology read   -MRI brain pituitary with and without contrast 3/22/2023: 1  Unremarkable exam of the pituitary  No evidence of lesion  2   Mild microangiopathic change  No evidence of acute infarct, intracranial hemorrhage or mass      -Carotid ultrasound 3/22/2023: There is no evidence of giant cell arteritis, aneurysm, or significant stenosis  noted in the superficial temporal artery and its branches  -CTA head and neck with and without contrast 3/22/2023: No significant stenosis of the cervical carotid or vertebral arteries  No significant intracranial stenosis, large vessel occlusion or aneurysm  -CTA head and neck with and without contrast for dizziness 8/3/2021: 1  No evidence of acute intracranial hemorrhage  2  No evidence of hemodynamic significant stenosis, aneurysm or dissection  Review of Systems:   ROS obtained by medical assistant Personally reviewed and updated if indicated  I recommended PCP follow up for non neurologic problems  Review of Systems   Constitutional: Negative for appetite change and fever  HENT: Negative  Negative for hearing loss, tinnitus, trouble swallowing and voice change  Eyes: Negative for photophobia, pain and visual disturbance  Respiratory: Negative  Negative for shortness of breath  Cardiovascular: Negative  Negative for palpitations  Gastrointestinal: Negative  Negative for nausea and vomiting  Endocrine: Negative  Negative for cold intolerance  Genitourinary: Negative  Negative for dysuria, frequency and urgency  Musculoskeletal: Negative  Negative for myalgias and neck pain  Skin: Negative  Negative for rash  Neurological: Negative  Negative for dizziness, tremors, seizures, syncope, facial asymmetry, speech difficulty, weakness, light-headedness, numbness and headaches  Hematological: Negative    Does not bruise/bleed easily  Psychiatric/Behavioral: Negative  Negative for confusion, hallucinations and sleep disturbance  All other systems reviewed and are negative  I have spent 58 minutes with Patient and  today in which greater than 50% of this time was spent in counseling/coordination of care regarding Diagnostic results, Prognosis, Risks and benefits of tx options, Instructions for management, Patient and family education, Importance of tx compliance, Risk factor reductions, Impressions, Counseling / Coordination of care, Documenting in the medical record and Obtaining or reviewing history    I also spent 17 minutes non face to face for this patient the same day           Author:  Merlyn Vidal MD 5/2/2023 6:44 PM

## 2023-05-02 NOTE — PROGRESS NOTES
Review of Systems   Constitutional: Negative for appetite change and fever  HENT: Negative  Negative for hearing loss, tinnitus, trouble swallowing and voice change  Eyes: Negative for photophobia, pain and visual disturbance  Respiratory: Negative  Negative for shortness of breath  Cardiovascular: Negative  Negative for palpitations  Gastrointestinal: Negative  Negative for nausea and vomiting  Endocrine: Negative  Negative for cold intolerance  Genitourinary: Negative  Negative for dysuria, frequency and urgency  Musculoskeletal: Negative  Negative for myalgias and neck pain  Skin: Negative  Negative for rash  Neurological: Negative  Negative for dizziness, tremors, seizures, syncope, facial asymmetry, speech difficulty, weakness, light-headedness, numbness and headaches  Hematological: Negative  Does not bruise/bleed easily  Psychiatric/Behavioral: Negative  Negative for confusion, hallucinations and sleep disturbance  All other systems reviewed and are negative

## 2023-05-09 ENCOUNTER — OFFICE VISIT (OUTPATIENT)
Dept: PULMONOLOGY | Facility: HOSPITAL | Age: 68
End: 2023-05-09

## 2023-05-09 VITALS
TEMPERATURE: 97.8 F | SYSTOLIC BLOOD PRESSURE: 132 MMHG | HEART RATE: 72 BPM | WEIGHT: 208.6 LBS | HEIGHT: 65 IN | RESPIRATION RATE: 18 BRPM | OXYGEN SATURATION: 95 % | BODY MASS INDEX: 34.75 KG/M2 | DIASTOLIC BLOOD PRESSURE: 78 MMHG

## 2023-05-09 DIAGNOSIS — G47.33 OSA ON CPAP: Primary | ICD-10-CM

## 2023-05-09 DIAGNOSIS — Z99.89 OSA ON CPAP: Primary | ICD-10-CM

## 2023-05-09 DIAGNOSIS — E66.01 MORBID OBESITY (HCC): ICD-10-CM

## 2023-05-09 LAB

## 2023-05-09 NOTE — ASSESSMENT & PLAN NOTE
Verta Boas is doing well on her CPAP  I was able to review her compliance data and she is wearing her CPAP all night every night  When she wears it her AHI is less than 1 and she has no leak  She averages around 12 cm of water  I refilled her CPAP supplies and we will see her back in 1 year  Up-to-date on all her vaccines and we talked about preventative care measures

## 2023-05-09 NOTE — PROGRESS NOTES
"Assessment/Plan:    DESTINEY on CPAP  Naya Mariee is doing well on her CPAP  I was able to review her compliance data and she is wearing her CPAP all night every night  When she wears it her AHI is less than 1 and she has no leak  She averages around 12 cm of water  I refilled her CPAP supplies and we will see her back in 1 year  Up-to-date on all her vaccines and we talked about preventative care measures  Diagnoses and all orders for this visit:    DESTINEY on CPAP  -     PAP DME Resupply/Reorder    Morbid obesity ([facility] [address] )          Subjective:      Patient ID: Chong Salas is a 75 y o  female  Naya Mariee is doing very well she has no changes in her health in fact she had shoulder surgery and her quality of life is improved  She wears her CPAP all night every night and feels well rested  The following portions of the patient's history were reviewed and updated as appropriate: allergies, current medications, past family history, past medical history, past social history, past surgical history and problem list     Review of Systems   Constitutional: Negative  Negative for unexpected weight change  HENT: Negative  Negative for postnasal drip  Eyes: Negative  Respiratory: Negative  Negative for cough, shortness of breath and wheezing  Cardiovascular: Negative  Negative for chest pain and leg swelling  Gastrointestinal: Negative  Endocrine: Negative  Genitourinary: Negative  Musculoskeletal: Negative  Allergic/Immunologic: Negative  Neurological: Negative  Hematological: Negative  Objective:      /78 (BP Location: Left arm, Patient Position: Sitting, Cuff Size: Adult)   Pulse 72   Temp 97 8 °F (36 6 °C) (Tympanic)   Resp 18   Ht 5' 5\" (1 651 m)   Wt 94 6 kg (208 lb 9 6 oz)   SpO2 95%   BMI 34 71 kg/m²          Physical Exam  Constitutional:       Appearance: She is well-developed  HENT:      Head: Normocephalic     Eyes:      Pupils: Pupils are equal, round, " and reactive to light  Cardiovascular:      Rate and Rhythm: Normal rate  Heart sounds: No murmur heard  Pulmonary:      Effort: Pulmonary effort is normal  No respiratory distress  Breath sounds: Normal breath sounds  No wheezing or rales  Abdominal:      Palpations: Abdomen is soft  Musculoskeletal:         General: Normal range of motion  Cervical back: Normal range of motion and neck supple  Skin:     General: Skin is warm and dry  Neurological:      Mental Status: She is alert and oriented to person, place, and time

## 2023-06-06 ENCOUNTER — TELEPHONE (OUTPATIENT)
Dept: NEUROLOGY | Facility: CLINIC | Age: 68
End: 2023-06-06

## 2023-06-06 NOTE — TELEPHONE ENCOUNTER
This is not something that should be triaged to a neurology doctor in clinic during the day as I did not see it till now after hours and our system is not set up for us to take incoming calls for transfer of care in the middle of a busy clinic  This is something that should have been sent to the inpatient team? Kelsea what is the protocol for this? Nursing team could you please help direct patient?

## 2023-06-06 NOTE — TELEPHONE ENCOUNTER
Patient calling into let Dr Gabby Johnson know that she is currently admitted to a hospital in [place], [place] for a medical emergency she had with numbness on her Left side   Patient had a CT Scan,  and they want to transfer patient to a hospital with a Neurologist      Please call  CB# [phone]

## 2023-06-07 NOTE — TELEPHONE ENCOUNTER
Left detailed message for pt per communication consent advising that the hospital would need to contact  to request the transfer    Gave her [facility]  phone number

## 2023-06-15 NOTE — TELEPHONE ENCOUNTER
Patient left voicemail to schedule follow up visit with Kerri Jaquez  Scheduled for next available 6/26/2023

## 2023-06-19 ENCOUNTER — TELEPHONE (OUTPATIENT)
Dept: NEUROLOGY | Facility: CLINIC | Age: 68
End: 2023-06-19

## 2023-06-19 NOTE — TELEPHONE ENCOUNTER
Called patient and left voicemail to confirm their upcoming appointment with Dr Mell Elder  Informed patient about arriving in the [place] location 15 minutes prior to appointment to get checked in and go over chart

## 2023-06-21 ENCOUNTER — TELEPHONE (OUTPATIENT)
Dept: NEUROLOGY | Facility: CLINIC | Age: 68
End: 2023-06-21

## 2023-06-21 NOTE — TELEPHONE ENCOUNTER
Left patient a message asking her if she could please switch her appointment from 6/26/23 to 6/29/23 so Dr Karen Maya can have an hour with her to review scans and recent hospitalization  I placed a spot on hold for patient for 6/29/23 at 8:30

## 2023-06-26 ENCOUNTER — TELEPHONE (OUTPATIENT)
Dept: NEUROLOGY | Facility: CLINIC | Age: 68
End: 2023-06-26

## 2023-06-26 NOTE — TELEPHONE ENCOUNTER
Received disc via mail in the [facility] [place] from [facility]   Disc given to Dr Nadya Green to download

## 2023-06-29 ENCOUNTER — OFFICE VISIT (OUTPATIENT)
Dept: NEUROLOGY | Facility: CLINIC | Age: 68
End: 2023-06-29
Payer: COMMERCIAL

## 2023-06-29 VITALS
TEMPERATURE: 97.4 F | HEIGHT: 65 IN | BODY MASS INDEX: 33.99 KG/M2 | DIASTOLIC BLOOD PRESSURE: 73 MMHG | HEART RATE: 77 BPM | WEIGHT: 204 LBS | SYSTOLIC BLOOD PRESSURE: 138 MMHG

## 2023-06-29 DIAGNOSIS — G93.2 IIH (IDIOPATHIC INTRACRANIAL HYPERTENSION): Primary | ICD-10-CM

## 2023-06-29 RX ORDER — DAPAGLIFLOZIN 10 MG/1
10 TABLET, FILM COATED ORAL DAILY
COMMUNITY
Start: 2023-06-15

## 2023-06-29 RX ORDER — HALOBETASOL PROPIONATE 0.05 %
OINTMENT (GRAM) TOPICAL AS NEEDED
COMMUNITY

## 2023-06-29 RX ORDER — CLOPIDOGREL BISULFATE 75 MG/1
75 TABLET ORAL DAILY
COMMUNITY
Start: 2023-06-09

## 2023-06-29 RX ORDER — ZOLEDRONIC ACID 5 MG/100ML
INJECTION, SOLUTION INTRAVENOUS
COMMUNITY

## 2023-06-29 RX ORDER — FLUTICASONE PROPIONATE 50 MCG
SPRAY, SUSPENSION (ML) NASAL
COMMUNITY

## 2023-06-29 RX ORDER — ACETAZOLAMIDE 125 MG/1
TABLET ORAL
Qty: 120 TABLET | Refills: 6 | Status: SHIPPED | OUTPATIENT
Start: 2023-06-29

## 2023-06-29 NOTE — PROGRESS NOTES
[address] [facility] Consult - Follow up   PATIENT:  Alisa Michel  MRN:  [id number]  :  1955  DATE OF SERVICE:  2023  REFERRED BY: No ref  provider found  PMD: Bina Castro MD    Assessment/Plan:   Alisa Michel is a delightful 75 y o  female with a past medical history that includes hypercalcemia, type 2 diabetes mellitus, thyroid versus parathyroid nodule, DESTINEY on CPAP (), hypertension, postural hypotension, normocytic anemia, hyperlipidemia,  headaches, BMI over 30, visual disturbance, autosomal dominant polycystic kidney disease, kidney stone, s/p shoulder surgery referred here for evaluation of headache  My initial evaluation 2023     Migraine without aura and without status migrainosus, not intractable  Visual disturbance  Suspected idiopathic intracranial hypertension   DESTINEY on CPAP  She reports a long history of headaches and migraines dating back to her teens and early 25s that seemed to significantly improve following diagnosis and treatment of DESTINEY in the   Initially, she felt that was the last time she had headaches, but in recollection she recalls she did follow with a neurologist in [place] in the early  and did round round of Botox for chronic migraine  She believes migraines may have improved following menopause  On 3/22/2023, she woke up with a headache and was playing cards when she noticed unilateral peripheral vision loss on the left, unclear if she woke up with that as well, but it persisted and she went to the emergency department where she was admitted for stroke evaluation and found not to have had a stroke  MRI brain was unremarkable for acute pathology, as was CTA head and neck, carotid ultrasound, TTE, and she was already on aspirin which was continued as well as risk factor control  She followed up with eye doctor last month and no pertinent issues were found except for early macular degeneration    She reports the pain was right temporal and therefore ESR and CRP were also obtained which were only slightly elevated and temporal ultrasound was unremarkable as well   -As of 5/2/2023: She reports no further migraines, no further vision changes  We discussed most likely etiology could be either suboptimally treated DESTINEY contributing to exacerbation of migraine or related to the hypercalcemia  She mentions some mild memory issues which are not atypical for age and [facility] today was normal   - as of 6/29/2023: She returns sooner than expected after an incident on 6/6/2023 as detailed in HPI where she had left hand paresthesias that radiate up to the left neck and then down the left side of the body lasting approximately 20 minutes not associated with headache  She was admitted to the hospital for MRI brain and MRA head were unremarkable for acute pathology and we discussed for my review she does appear to have subtle signs of idiopathic intracranial hypertension and I suggest trial of acetazolamide/Diamox to see if this is the cause of the visual issues she has been following with 2 eye doctors for despite no papilledema  She is using her CPAP and AHI is less than 1 without leak  Following up with nephrology for hypercalcemia  We discussed that I am not convinced the episode was a TIA, but since the inpatient neurologist recommended transition to Plavix, I would not disagree with their recommendations out of precaution  Workup:  - Neurologic exam was unremarkable for concerning findings, MOCA normal 27/30 (-2 recall, -1 repeat sentence)   -MRI brain pituitary with and without contrast 3/22/2023: 1  Unremarkable exam of the pituitary  No evidence of lesion  2   Mild microangiopathic change    No evidence of acute infarct, intracranial hemorrhage or mass   -Carotid ultrasound 3/22/2023: There is no evidence of giant cell arteritis, aneurysm, or significant stenosis  noted in the superficial temporal artery and its branches  -CTA head and neck with and without contrast 3/22/2023: No significant stenosis of the cervical carotid or vertebral arteries  No significant intracranial stenosis, large vessel occlusion or aneurysm   - MRI brain without contrast 6/7/2023: 1  No acute infarction  2  Mild chronic microvascular changes  Remote white matter change just   lateral to the atrium of left lateral ventricle  3  Tiny susceptible focus, left temporal lobe, as described (It likely reflects a remote petechial hemorrhage or   small vascular calcification)  Neither remote   petechial hemorrhage or small vascular calcification  4  No mass effect or midline shift    - MRA brain 6/7/2023: Evaluation of the petrous, cavernous and supraclinoid portions of the distal internal carotid arteries as well as the anterior cerebral and middle   cerebral arteries is unremarkable without evidence of aneurysm, stenosis or   major vessel occlusion      -MRA neck and carotid ultrasound 6/7/2023: see diagnostics for details    Preventative:  - we discussed headache hygiene and lifestyle factors that may improve headaches  -   Trial of  acetaZOLAMIDE (DIAMOX) 125 mg tablet; 125 mg PO nightly for 1 week, then increase to 125 mg BID for 1 week, then 125 mg in am and 250 mg in pm for 1 week, then 250 mg BID  Discussed proper use, possible side effects and risks    - Currently on through other providers: Losartan/Cozaar 50 mg daily, vitamin D 2000 units daily, aspirin 81 mg daily  - Past/ failed/contraindicated: Vasotec  - future options: Topiramate although with caution due to history of kidney stones, verapamil, CGRP med, botox    Rescue:  - recommend not taking over-the-counter or prescription pain medications more than 3 days per week to prevent medication overuse/rebound headache  - Prescription abortive not indicated at this time, we discussed most likely thing to help for prolonged episode in the future would be steroids if nephrology would be "agreeable for short course  - Currently on through other providers: OTC meds  - Past/ failed/contraindicated: Fioricet  - future options:  Triptan, prochlorperazine, Toradol IM or p o , could consider trial of 5 days of Depakote 500 mg nightly or dexamethasone 2 mg daily for prolonged migraine, kingsley Coy nurtec      Patient instructions      When you see your eye doctor next, please discuss with him the possible idiopathic intracranial hypertension and ask if they see any potential changes related to this and please let me know if they do and do not trust that they will send me the note  Try and see if sleeping on your side all the time is better, consider zzoma pillow     continue to pursue the high calcium which could contribute to symptoms as well     Discuss with nephrology whether they would be ok with 1-5 days of steroids if needed to break this in the future  Headache/migraine treatment:   Rescue medications (for immediate treatment of a headache): It is ok to take ibuprofen, acetaminophen or naproxen (Advil, Tylenol,  Aleve, Excedrin) if they help your headaches you should limit these to No more than 3 times a week to avoid medication overuse/rebound headaches  \"exedrin tension\" - caffeine/tylenol       Prescription preventive medications for headaches/migraines   (to take every day to help prevent headaches - not to take at the time of headache):  [x] we have options if needed      I suggest trial of lower dose diamox than we typically use in more severe cases -  -     acetaZOLAMIDE (DIAMOX) 125 mg tablet; 125 mg PO nightly for 1 week, then increase to 125 mg  in am and in pm for 1 week, then 125 mg in am and 250 mg in pm for 1 week, then 250 mg in am and in pm         -If you had papilledema or swelling behind your eyes we would rapidly get you up to 500 mg twice a day and may need to go higher    I have 1 patient who is up to 3000 mg in a day just for reference and I will be starting " you on 125 mg       - if a lower dose is helpful, can stay lower, if higher dose causes side effects, go back to last tolerated dose  If you get all the way up to the dose recommended and is not helping enough let me know as I will absolutely go higher     - make sure to stay hydrated while on this as can cause dehydration since that is it's purpose to take fluid off    - most common side effect is tingling of the nerves at times  - can cause electrolyte disturbances, but not typically in any significant way  However, be cautious if taking with other meds that lower potassium like hydrochlorothiazide etc        *Typically these types of medications take time until you see the benefit, although some may see improvement in days, often it may take weeks, especially if the medication is being titrated up to a beneficial level  Please contact us if there are any concerns or questions regarding the medication  Lifestyle Recommendations:  [x] SLEEP - Maintain a regular sleep schedule: Adults need at least 7-8 hours of uninterrupted a night  Maintain good sleep hygiene:  Going to bed and waking up at consistent times, avoiding excessive daytime naps, avoiding caffeinated beverages in the evening, avoid excessive stimulation in the evening and generally using bed primarily for sleeping  One hour before bedtime would recommend turning lights down lower, decreasing your activity (may read quietly, listen to music at a low volume)  When you get into bed, should eliminate all technology (no texting, emailing, playing with your phone, iPad or tablet in bed)  [x] HYDRATION - Maintain good hydration  Drink  2L of fluid a day (4 typical small water bottles)  [x] DIET - Maintain good nutrition  In particular don't skip meals and try and eat healthy balanced meals regularly  [x] TRIGGERS - Look for other triggers and avoid them: Limit caffeine to 1-2 cups a day or less   Avoid dietary triggers that you have noticed bring on "your headaches (this could include aged cheese, peanuts, MSG, aspartame and nitrates)  [x] EXERCISE - physical exercise as we all know is good for you in many ways, and not only is good for your heart, but also is beneficial for your mental health, cognitive health and  chronic pain/headaches  I would encourage at the least 5 days of physical exercise weekly for at least 30 minutes  Education and Follow-up  [x] Please call with any questions or concerns  Of course if any new concerning symptoms go to the emergency department  [x] Follow up 4-8 weeks if needed, otherwise as scheduled on 11/7/23, sooner if needed       CC: We had the pleasure of evaluating Benjamin Todd in neurological consultation today  Benjamin Todd is a   right handed female who presents today for evaluation of headaches  History obtained from patient as well as available medical record review  History of Present Illness:   Interval history as of 6/29/2023  -5/9/2023 she followed up with pulm for DESTINEY - \"she is wearing her CPAP all night every night  When she wears it her AHI is less than 1 and she has no leak\"  -5[facility]19[facility]2023 [facility]   -6/1/2023 followed up with nephrology - 24 hour urine study   -6/15/2023 followed up with PCP -    Regular eye doctor and then retina specialist soon to follow up - Oct 6th     Vacation home up there in the [facility] if why they were up there    On 6/6/2023, she was awake already around 7:30 am and folding clothes and felt numb in the whole hand and ran some water under the hand and was massaging it and then it went up to the left neck and then the left breast and down the side, and woke up  and then drove in   lasted approximately 20 minutes and then resolved  30 min drive    They consulted with a neurologist via telemedicine and was transferred to another facility to get MRI brain and MRA head      Per discharge summary, she was eval by neurology and it was " thought to be a TIA and she was started on dual antiplatelet therapy with aspirin Plavix for 3 weeks and they recommended continuing Plavix thereafter since they believe she failed aspirin therapy  Headaches and migraines   None, first started March peripheral vision and now having floaters over the past week, first mentioned yesterday      History as of initial visit 5/2/2023:  Admitted 3/22/2023 to 3/23/23  -She presented to the emergency department with headache and was playing cards and noticed peripheral vision loss on the left side was gone (unsure when started and lasted into the night) with a right temporal with the left peripheral vision loss  Woke up with it, tried tylenol without relief, went to /76, NIHSS 1, MRI Brain, CTA h/N, carotid US, mildly elevated CRP, ESR and temporal US unremarakable, A1c 6 5, TTE ok 65%, migraine resolved the next am  Migraine cocktail did not help in hospital    -MRI brain pituitary with and without contrast 3/22/2023: 1  Unremarkable exam of the pituitary  No evidence of lesion  2   Mild microangiopathic change  No evidence of acute infarct, intracranial hemorrhage or mass   -Carotid ultrasound 3/22/2023: There is no evidence of giant cell arteritis, aneurysm, or significant stenosis  noted in the superficial temporal artery and its branches  -CTA head and neck with and without contrast 3/22/2023: No significant stenosis of the cervical carotid or vertebral arteries  No significant intracranial stenosis, large vessel occlusion or aneurysm     -Further episodes since hospitalization and blood pressure runs 130s over 60s to 70s    Memory  Occasionally repeating things that were already said  Forgetfulness with phone, keys, wallet  No family history of dementia   No driving safety issues       Headaches started at what age?  Teens, early 25s   How often do the headaches occur?   -Has a history of headaches in the past that seemed to resolve since using CPAP 1990s  - not had headaches in a long time since likely 1990s she had thought but on second thought did see someone in [place] early 2000s and did one round of botox  - as of 5/2/2023: no further episodes     Aura? Without      Last eye exam: in April 2023 saw retina specialist and no issues   Early mac degeneration     Where is your headache located and pain quality?   - right temple dull pain constant     What is the intensity of pain? Average: 8/10, worst likely 10 a long time ago/10  Associated symptoms:   [] Nausea       [] Vomiting         [] Photophobia     []Phonophobia      [] Osmophobia  [] Blurred vision - left side unsure if just left eye or both  [] Light-headed or dizzy   - infrequent, mild   [] Tinnitus   [] Hands or feet tingle or feel numb/paresthesias    [] Ptosis      [] Facial droop  [] Lacrimation  [] Nasal congestion/rhinorrhea        Have you seen someone else for headaches or pain? Yes, PCP, neurology   Have you had trigger point injection performed and how often? No  Have you had Botox injection performed and how often? Yes - once on the forehead  Have you had epidural injections or transforaminal injections performed? no  Are you current pregnant or planning on getting pregnant? Menopause unknown due to partial hysterectomy   Have you ever had any Brain imaging? yes    What medications do you take or have you taken for your headaches? ABORTIVE/pertinent p r n   Meds:      Not much     Past   fioricet    PREVENTIVE/pertinent daily meds:       Losartan/Cozaar 50 mg - years   Vitamin D 2000 units daily  Aspirin 81 mg daily    Past/ failed/contraindicated:  vasotec       LIFESTYLE  Sleep   - averages: 7-8 hours and naps       The following portions of the patient's history were reviewed and updated as appropriate: allergies, current medications, past family history, past medical history, past social history, past surgical history and problem list     Pertinent family history:  Family history of headaches:  migraine headaches in mother, sister     Pertinent social history:  Work: retired  Education: college, [employer]  Lives with       Past Medical History:     Past Medical History:   Diagnosis Date   • Arthritis    • 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    • Diabetes mellitus ([facility] )    • GERD (gastroesophageal reflux disease)    • Renal disorder    • Sleep apnea        Patient Active Problem List   Diagnosis   • Essential hypertension   • Hyperlipidemia   • Type 2 diabetes mellitus without complication, without long-term current use of insulin ([facility] )   • Hypercalcemia   • Thyroid nodule   • Morbid obesity ([facility] )   • DESTINEY on CPAP   • Visual disturbance       Medications:      Current Outpatient Medications   Medication Sig Dispense Refill   • acetaZOLAMIDE (DIAMOX) 125 mg tablet 125 mg PO nightly for 1 week, then increase to 125 mg BID for 1 week, then 125 mg in am and 250 mg in pm for 1 week, then 250 mg  tablet 6   • aspirin 81 mg chewable tablet Chew 1 tablet (81 mg total) daily Do not start before March 24, 2023  30 tablet 0   • cetirizine (ZyrTEC) 10 mg tablet Take 10 mg by mouth daily     • cholecalciferol (VITAMIN D3) 1,000 units tablet Take 2,000 Units by mouth daily     • clopidogrel (PLAVIX) 75 mg tablet Take 75 mg by mouth daily     • Farxiga 10 MG tablet Take 10 mg by mouth daily     • fluticasone (FLONASE) 50 mcg/act nasal spray SPRAY 1 SPRAY INTO EACH NOSTRIL EVERY DAY     • halobetasol (ULTRAVATE) 0 05 % ointment Apply topically if needed     • losartan (COZAAR) 50 mg tablet TAKE 1 TABLET BY MOUTH EVERY DAY 90 tablet 1   • metFORMIN (GLUCOPHAGE) 1000 MG tablet TAKE 1 TABLET BY MOUTH TWICE A DAY WITH MEALS 180 tablet 3   • Multiple Vitamins-Minerals (PRESERVISION AREDS PO) Take by mouth 2 (two) times a day     • pantoprazole (PROTONIX) 40 mg tablet TAKE 1 TABLET BY MOUTH EVERY DAY 90 tablet 5   • simvastatin (ZOCOR) 40 mg tablet Take 40 mg by mouth every evening     • zoledronic acid (Reclast) 5 mg/100 mL IV infusion (premix) as directed Intravenous     • Cholecalciferol (Vitamin D3) 50 MCG (2000 UT) capsule Take 2,000 Units by mouth daily       No current facility-administered medications for this visit  Allergies:       Allergies   Allergen Reactions   • Demerol [Meperidine] GI Intolerance     Other reaction(s): N/V   • Milk-Related Compounds - Food Allergy Sneezing   • Morphine GI Intolerance     Other reaction(s): N/V   • Meloxicam Swelling       Family History:     Family History   Problem Relation Age of Onset   • COPD Mother    • Lung cancer Mother    • Lung cancer Father    • Hypertension Father    • No Known Problems Sister    • Hypertension Brother    • Colon polyps Brother    • Kidney disease Brother    • Colon polyps Brother    • Stroke Brother    • Kidney disease Brother    • Hypertension Brother    • Hyperlipidemia Brother    • Gestational diabetes Sister    • Colon cancer Maternal Aunt        Social History:     Social History     Socioeconomic History   • Marital status: /Civil Union     Spouse name: Not on file   • Number of children: Not on file   • Years of education: Not on file   • Highest education level: Not on file   Occupational History   • Not on file   Tobacco Use   • Smoking status: Former     Packs/day: 0 50     Years: 8 00     Total pack years: 4 00     Types: Cigarettes     Start date: 79     Quit date: 10/29/1981     Years since quittin 6   • Smokeless tobacco: Never   Vaping Use   • Vaping Use: Never used   Substance and Sexual Activity   • Alcohol use: Never   • Drug use: Not Currently   • Sexual activity: Not on file   Other Topics Concern   • Not on file   Social History Narrative   • Not on file     Social Determinants of Health     Financial Resource Strain: Not on file   Food Insecurity: Not on file   Transportation Needs: Not on file   Physical Activity: Not on file   Stress: Not on file   Social Connections: Not on file "  Intimate Partner Violence: Not on file   Housing Stability: Not on file         Objective:       Physical Exam:                                                                 Vitals:            Constitutional:    /73 (BP Location: Right arm, Patient Position: Sitting, Cuff Size: Standard)   Pulse 77   Temp (!) 97 4 °F (36 3 °C) (Tympanic)   Ht 5' 5\" (1 651 m)   Wt 92 5 kg (204 lb)   BMI 33 95 kg/m²   BP Readings from Last 3 Encounters:   06/29/23 138/73   05/09/23 132/78   05/02/23 144/79     Pulse Readings from Last 3 Encounters:   06/29/23 77   05/09/23 72   05/02/23 77         Well developed, well nourished, well groomed  No dysmorphic features  HEENT:  Normocephalic atraumatic  Oropharynx appears clear and moist  No oral mucosal lesions noted  Chest:  Respirations appear regular and unlabored  Cardiovascular:  Distal extremities warm without palpable edema or tenderness, no observed significant swelling  Musculoskeletal:  (see below under neurologic exam for evaluation of motor function and gait)   Skin:  warm and dry  No apparent birthmarks or stigmata of neurocutaneous disease  Psychiatric:  Normal behavior and appropriate affect      Malampati score 4     Neurological Examination:     Mental status/cognitive function:   Recent and remote memory intact  Attention span and concentration as well as fund of knowledge are appropriate for age  Normal language and spontaneous speech  Cranial Nerves:  II-visual fields full  Fundi poorly visualized due to pupillary constriction  III, IV, VI-Pupils were equal, round, and reactive to light and accommodation  Extraocular movements were full and conjugate without nystagmus  V-facial sensation symmetric  VII-facial expression symmetric, intact forehead wrinkle, strong eye closure, symmetric smile    VIII-hearing grossly intact bilaterally   IX, X-palate elevation symmetric, no dysarthria     XI-shoulder shrug strength intact  " XII-tongue protrusion midline  Motor Exam: symmetric bulk and tone throughout, no pronator drift  Power/strength 5/5 bilateral upper and lower extremities, no atrophy, fasciculations or abnormal movements noted  Sensory: grossly intact light touch in all extremities  Reflexes: brachioradialis 2+, biceps 2+, knee 2+, ankle 2+ bilaterally  No ankle clonus   Coordination: Finger nose finger intact bilaterally, no apparent dysmetria, ataxia or tremor noted  Gait: steady casual and tandem gait  Pertinent lab results:   -3/22/2023 CBC significant for hemoglobin 10 4 MCV 92  BMP unremarkable  LDL 52  ESR 41  CRP 11 7  A1c 6 5  10/27/2022 CMP remarkable for glucose 126, calcium 10 6  9/22/2021 vitamin D  TSH normal  PTH 32 with calcium of 10 4     Imaging: I have personally reviewed imaging and radiology read     - MRI brain without contrast 6/7/2023: 1  No acute infarction  2  Mild chronic microvascular changes  Remote white matter change just   lateral to the atrium of left lateral ventricle  3  Tiny susceptible focus, left temporal lobe, as described (It likely reflects a remote petechial hemorrhage or   small vascular calcification)  Neither remote   petechial hemorrhage or small vascular calcification  4  No mass effect or midline shift        - MRA brain 6/7/2023: Evaluation of the petrous, cavernous and supraclinoid portions of the    distal internal carotid arteries as well as the anterior cerebral and middle   cerebral arteries is unremarkable without evidence of aneurysm, stenosis or   major vessel occlusion      -MRA neck 6/7/2023:  FINDINGS:   There is antegrade flow in the carotid and vertebral arteries  Aortic Arch and Great Vessel Origins: Widely patent  Subclavian Arteries: Widely patent  Right Common Carotid Artery: Widely patent  Right Internal Carotid Artery: Widely patent  Right External Carotid Artery: Widely patent  Left Common Carotid Artery: Widely patent     Left Internal Carotid Artery: Widely patent  Left External Carotid Artery: Widely patent  Right Vertebral Artery: Widely patent  Left Vertebral Artery: Widely patent  Basilar Artery: Widely patent  The vertebrobasilar junction is tortuous  - Carotid ultrasound 6/7/2023: On the right side there is a mild amount of plaque seen  Peak systolic velocity   in the right internal carotid artery is  measured at 93 cm/sec  Flow in the   right vertebral artery is antegrade in direction  Internal carotid to common   carotid artery velocity ratio measures 1 1  On the left side there is a mild amount of plaque seen  Peak systolic velocity   in left internal carotid artery is measured at 85 cm/sec  Flow in the left   vertebral artery is antegrade in direction   Internal carotid to common carotid   artery velocity ratio measures 1 2  Of note evaluation of the left neck is a heterogeneous appearing left thyroidal   lobe with areas of lucency and some more solid-appearing areas  Further   evaluation with thyroid ultrasound is recommended  -MRI brain pituitary with and without contrast 3/22/2023: 1   Unremarkable exam of the pituitary   No evidence of lesion  2   Mild microangiopathic change   No evidence of acute infarct, intracranial hemorrhage or mass      -Carotid ultrasound 3/22/2023: There is no evidence of giant cell arteritis, aneurysm, or significant stenosis  noted in the superficial temporal artery and its branches      -CTA head and neck with and without contrast 3/22/2023: No significant stenosis of the cervical carotid or vertebral arteries  No significant intracranial stenosis, large vessel occlusion or aneurysm      -CTA head and neck with and without contrast for dizziness 8/3/2021: 1  No evidence of acute intracranial hemorrhage  2  No evidence of hemodynamic significant stenosis, aneurysm or dissection      Review of Systems:   ROS obtained by medical assistant Personally reviewed and updated if indicated  I recommended PCP follow up for non neurologic problems  Review of Systems   Constitutional: Negative for appetite change and fever  HENT: Negative  Negative for hearing loss, tinnitus, trouble swallowing and voice change  Eyes: Negative  Negative for photophobia and pain  Respiratory: Negative  Negative for shortness of breath  Cardiovascular: Negative  Negative for palpitations  Gastrointestinal: Negative  Negative for nausea and vomiting  Endocrine: Negative  Negative for cold intolerance  Genitourinary: Negative  Negative for dysuria, frequency and urgency  Musculoskeletal: Negative  Negative for myalgias and neck pain  Skin: Negative  Negative for rash  Neurological: Negative  Negative for dizziness, tremors, seizures, syncope, facial asymmetry, speech difficulty, weakness, light-headedness, numbness and headaches  Hematological: Negative  Does not bruise/bleed easily  Psychiatric/Behavioral: Negative  Negative for confusion, hallucinations and sleep disturbance  All other systems reviewed and are negative  I have spent 52 minutes with Patient and family today in which greater than 50% of this time was spent in counseling/coordination of care regarding Diagnostic results, Prognosis, Risks and benefits of tx options, Instructions for management, Patient and family education, Importance of tx compliance, Risk factor reductions, Impressions, Counseling / Coordination of care, Documenting in the medical record, Reviewing / ordering tests, medicine, procedures   and Obtaining or reviewing history    I also spent 23 minutes non face to face for this patient the same day         Author:  Shraddha Noguera MD 6/29/2023 5:05 PM

## 2023-06-29 NOTE — PATIENT INSTRUCTIONS
"When you see your eye doctor next, please discuss with him the possible idiopathic intracranial hypertension and ask if they see any potential changes related to this and please let me know if they do and do not trust that they will send me the note  Try and see if sleeping on your side all the time is better, consider gregory pillow     continue to pursue the high calcium which could contribute to symptoms as well     Discuss with nephrology whether they would be ok with 1-5 days of steroids if needed to break this in the future  Headache/migraine treatment:   Rescue medications (for immediate treatment of a headache): It is ok to take ibuprofen, acetaminophen or naproxen (Advil, Tylenol,  Aleve, Excedrin) if they help your headaches you should limit these to No more than 3 times a week to avoid medication overuse/rebound headaches  \"exedrin tension\" - caffeine/tylenol       Prescription preventive medications for headaches/migraines   (to take every day to help prevent headaches - not to take at the time of headache):  [x] we have options if needed      I suggest trial of lower dose diamox than we typically use in more severe cases -  -     acetaZOLAMIDE (DIAMOX) 125 mg tablet; 125 mg PO nightly for 1 week, then increase to 125 mg  in am and in pm for 1 week, then 125 mg in am and 250 mg in pm for 1 week, then 250 mg in am and in pm         -If you had papilledema or swelling behind your eyes we would rapidly get you up to 500 mg twice a day and may need to go higher  I have 1 patient who is up to 3000 mg in a day just for reference and I will be starting you on 125 mg       - if a lower dose is helpful, can stay lower, if higher dose causes side effects, go back to last tolerated dose    If you get all the way up to the dose recommended and is not helping enough let me know as I will absolutely go higher     - make sure to stay hydrated while on this as can cause dehydration since that is it's " purpose to take fluid off    - most common side effect is tingling of the nerves at times  - can cause electrolyte disturbances, but not typically in any significant way  However, be cautious if taking with other meds that lower potassium like hydrochlorothiazide etc        *Typically these types of medications take time until you see the benefit, although some may see improvement in days, often it may take weeks, especially if the medication is being titrated up to a beneficial level  Please contact us if there are any concerns or questions regarding the medication  Lifestyle Recommendations:  [x] SLEEP - Maintain a regular sleep schedule: Adults need at least 7-8 hours of uninterrupted a night  Maintain good sleep hygiene:  Going to bed and waking up at consistent times, avoiding excessive daytime naps, avoiding caffeinated beverages in the evening, avoid excessive stimulation in the evening and generally using bed primarily for sleeping  One hour before bedtime would recommend turning lights down lower, decreasing your activity (may read quietly, listen to music at a low volume)  When you get into bed, should eliminate all technology (no texting, emailing, playing with your phone, iPad or tablet in bed)  [x] HYDRATION - Maintain good hydration  Drink  2L of fluid a day (4 typical small water bottles)  [x] DIET - Maintain good nutrition  In particular don't skip meals and try and eat healthy balanced meals regularly  [x] TRIGGERS - Look for other triggers and avoid them: Limit caffeine to 1-2 cups a day or less  Avoid dietary triggers that you have noticed bring on your headaches (this could include aged cheese, peanuts, MSG, aspartame and nitrates)  [x] EXERCISE - physical exercise as we all know is good for you in many ways, and not only is good for your heart, but also is beneficial for your mental health, cognitive health and  chronic pain/headaches   I would encourage at the least 5 days of physical exercise weekly for at least 30 minutes  Education and Follow-up  [x] Please call with any questions or concerns  Of course if any new concerning symptoms go to the emergency department    [x] Follow up 4-8 weeks if needed, otherwise as scheduled on 11/7/23, sooner if needed

## 2023-06-30 ENCOUNTER — TELEPHONE (OUTPATIENT)
Dept: NEUROLOGY | Facility: CLINIC | Age: 68
End: 2023-06-30

## 2023-06-30 NOTE — TELEPHONE ENCOUNTER
This is [facility] pharmacy in [place], [place]. We got a script sent over for Tomy Westbrook, date of birth. [birth year], Dr. Fidelia Hudson wrote for acetazolamide 125 milligram tablets, which is the Diamox. We only have the 250  milligram tablets in stock. We were wondering shamika you wanna start her out with 125. Um we were wondering if we could change those directions and give 250 milligram tablets and just tell her take a half tablet nightly for one week and then a half tab of twice a day for one week and a half tablet in the morning. And then one tablet in the evening of the 250. Just let us know, unless you wanna send over new scripts, that would be good and its [facility] in [facility]. Phone number [phone]. Thank you very much.

## 2023-07-03 NOTE — TELEPHONE ENCOUNTER
Pt made aware of below. She states that the pharm told her that the 125mg tabs were ordered and will be in today. She is agreeable to 250mg tabs if needed    Called pharm and left message stating if needed pt and provider are agreeable to using 250mg tabs. Also noted that pt said that they were ordering 125mg tabs and should be in today.

## 2023-08-10 DIAGNOSIS — G93.2 IIH (IDIOPATHIC INTRACRANIAL HYPERTENSION): ICD-10-CM

## 2023-08-10 NOTE — TELEPHONE ENCOUNTER
Patient is looking to have her medication refill for acetazolamide sent to [facility] in [place]. She also has questions about how she is taking it. Please call back.

## 2023-08-14 RX ORDER — ACETAZOLAMIDE 250 MG/1
TABLET ORAL
Qty: 180 TABLET | Refills: 3 | Status: SHIPPED | OUTPATIENT
Start: 2023-08-14

## 2023-08-14 NOTE — TELEPHONE ENCOUNTER
Called pt and states that she takes diamox 125 mg 2 tab bid. She is not happy w/ [address]. Every time she calls to request a refill, someone usually hangs up on her. Pt is agreeable to 250 mg tabs. Requesting script, 90 day supply be sent to [facility] pharmacy in [place]. Rx entered.  Pls review and sign off    thanks

## 2023-10-06 LAB
LEFT EYE DIABETIC RETINOPATHY: NORMAL
RIGHT EYE DIABETIC RETINOPATHY: NORMAL

## 2023-11-03 LAB
ALBUMIN SERPL-MCNC: 4.5 G/DL (ref 3.9–4.9)
ALBUMIN/GLOB SERPL: 1.8 {RATIO} (ref 1.2–2.2)
ALP SERPL-CCNC: 99 IU/L (ref 44–121)
ALT SERPL-CCNC: 23 IU/L (ref 0–32)
AST SERPL-CCNC: 20 IU/L (ref 0–40)
BILIRUB SERPL-MCNC: 0.5 MG/DL (ref 0–1.2)
BUN SERPL-MCNC: 14 MG/DL (ref 8–27)
BUN/CREAT SERPL: 18 (ref 12–28)
CALCIUM SERPL-MCNC: 10.4 MG/DL (ref 8.7–10.3)
CHLORIDE SERPL-SCNC: 107 MMOL/L (ref 96–106)
CO2 SERPL-SCNC: 21 MMOL/L (ref 20–29)
CREAT SERPL-MCNC: 0.77 MG/DL (ref 0.57–1)
EGFR: 83 ML/MIN/1.73
GLOBULIN SER-MCNC: 2.5 G/DL (ref 1.5–4.5)
GLUCOSE SERPL-MCNC: 139 MG/DL (ref 70–99)
POTASSIUM SERPL-SCNC: 4.5 MMOL/L (ref 3.5–5.2)
PROT SERPL-MCNC: 7 G/DL (ref 6–8.5)
SODIUM SERPL-SCNC: 142 MMOL/L (ref 134–144)

## 2023-11-07 ENCOUNTER — OFFICE VISIT (OUTPATIENT)
Dept: NEUROLOGY | Facility: CLINIC | Age: 68
End: 2023-11-07
Payer: COMMERCIAL

## 2023-11-07 VITALS
HEART RATE: 74 BPM | WEIGHT: 198 LBS | DIASTOLIC BLOOD PRESSURE: 62 MMHG | BODY MASS INDEX: 32.99 KG/M2 | SYSTOLIC BLOOD PRESSURE: 132 MMHG | TEMPERATURE: 96.5 F | HEIGHT: 65 IN

## 2023-11-07 DIAGNOSIS — G43.009 MIGRAINE WITHOUT AURA AND WITHOUT STATUS MIGRAINOSUS, NOT INTRACTABLE: Primary | ICD-10-CM

## 2023-11-07 PROCEDURE — 99215 OFFICE O/P EST HI 40 MIN: CPT | Performed by: PSYCHIATRY & NEUROLOGY

## 2023-11-07 RX ORDER — POTASSIUM CITRATE 15 MEQ/1
15 TABLET, EXTENDED RELEASE ORAL 2 TIMES DAILY
COMMUNITY
Start: 2023-08-31

## 2023-11-07 RX ORDER — MV-MIN/FA/VIT K/LUTEIN/ZEAXANT 200MCG-5MG
1 CAPSULE ORAL 2 TIMES DAILY
COMMUNITY

## 2023-11-07 RX ORDER — LANOLIN ALCOHOL/MO/W.PET/CERES
325 CREAM (GRAM) TOPICAL
COMMUNITY
Start: 2023-10-23

## 2023-11-07 NOTE — PROGRESS NOTES
Review of Systems   Constitutional:  Negative for appetite change and fever. HENT: Negative. Negative for hearing loss, tinnitus, trouble swallowing and voice change. Eyes: Negative. Negative for photophobia and pain. Respiratory: Negative. Negative for shortness of breath. Cardiovascular: Negative. Negative for palpitations. Gastrointestinal: Negative. Negative for nausea and vomiting. Endocrine: Negative. Negative for cold intolerance. Genitourinary: Negative. Negative for dysuria, frequency and urgency. Musculoskeletal: Negative. Negative for myalgias and neck pain. Skin: Negative. Negative for rash. Neurological:  Positive for headaches. Negative for dizziness, tremors, seizures, syncope, facial asymmetry, speech difficulty, weakness, light-headedness and numbness. Hematological: Negative. Does not bruise/bleed easily. Psychiatric/Behavioral: Negative. Negative for confusion, hallucinations and sleep disturbance. All other systems reviewed and are negative.

## 2023-11-07 NOTE — PATIENT INSTRUCTIONS
Continue to follow regularly with eye doctor    continue to pursue the high calcium which could contribute to symptoms as well   -Sounds like they do not think it is primary hyperparathyroidism    Discuss with nephrology whether they would be ok with 1-5 days of steroids if needed to break this in the future. Headache/migraine treatment:   Rescue medications (for immediate treatment of a headache): It is ok to take acetaminophen  if they help your headaches you should limit these to No more than 3 times a week to avoid medication overuse/rebound headaches. "exedrin tension" - caffeine/tylenol       Prescription preventive medications for headaches/migraines   (to take every day to help prevent headaches - not to take at the time of headache):  [x] we have options if needed      -     acetaZOLAMIDE (DIAMOX) 250 mg in am and in pm      If ever needed you could absolutely add 125 mg or 250 mg twice during the day time between these doses if you feel symptoms are ever worse midday. Certainly if you happen to awaken at night you absolutely could take 125 mg or 250 mg overnight as well    If you were to increase these medications and if needed there is a longer acting form at 500 mg twice a day. - if a lower dose is helpful, can stay lower, if higher dose causes side effects, go back to last tolerated dose. If you get all the way up to the dose recommended and is not helping enough let me know as I will absolutely go higher.    - make sure to stay hydrated while on this as can cause dehydration since that is it's purpose to take fluid off.   - most common side effect is tingling of the nerves at times  - can cause electrolyte disturbances, but not typically in any significant way.  However, be cautious if taking with other meds that lower potassium like hydrochlorothiazide etc    *Typically these types of medications take time until you see the benefit, although some may see improvement in days, often it may take weeks, especially if the medication is being titrated up to a beneficial level. Please contact us if there are any concerns or questions regarding the medication. Lifestyle Recommendations:  [x] SLEEP - Maintain a regular sleep schedule: Adults need at least 7-8 hours of uninterrupted a night. Maintain good sleep hygiene:  Going to bed and waking up at consistent times, avoiding excessive daytime naps, avoiding caffeinated beverages in the evening, avoid excessive stimulation in the evening and generally using bed primarily for sleeping. One hour before bedtime would recommend turning lights down lower, decreasing your activity (may read quietly, listen to music at a low volume). When you get into bed, should eliminate all technology (no texting, emailing, playing with your phone, iPad or tablet in bed). [x] HYDRATION - Maintain good hydration. Drink  2L of fluid a day (4 typical small water bottles)  [x] DIET - Maintain good nutrition. In particular don't skip meals and try and eat healthy balanced meals regularly. [x] TRIGGERS - Look for other triggers and avoid them: Limit caffeine to 1-2 cups a day or less. Avoid dietary triggers that you have noticed bring on your headaches (this could include aged cheese, peanuts, MSG, aspartame and nitrates). [x] EXERCISE - physical exercise as we all know is good for you in many ways, and not only is good for your heart, but also is beneficial for your mental health, cognitive health and  chronic pain/headaches. I would encourage at the least 5 days of physical exercise weekly for at least 30 minutes. Education and Follow-up  [x] Please call with any questions or concerns. Of course if any new concerning symptoms go to the emergency department.   [x] Follow up 3-4 months and if everything is exactly the same certainly you could push it back to 6-months, sooner if needed

## 2023-11-07 NOTE — PROGRESS NOTES
[facility]rology Conc[facility]Headache [facility] Consult - Follow up   PATIENT:  Rubi Salamanca  MRN:  [id number]  :  1955  DATE OF SERVICE:  2023  REFERRED BY: No ref. provider found  PMD: Gabriela Cruz MD    Assessment/Plan:   Rubi Salamanca is a delightful 70 y.o. female with a past medical history that includes hypercalcemia, type 2 diabetes mellitus, thyroid versus parathyroid nodule, DESTINEY on CPAP (), hypertension, postural hypotension, normocytic anemia, hyperlipidemia,  headaches, BMI over 30, visual disturbance, autosomal dominant polycystic kidney disease*, kidney stone, s/p shoulder surgery referred here for evaluation of headache. My initial evaluation 2023     Migraine without aura and without status migrainosus, not intractable  Visual disturbance  Features of idiopathic intracranial hypertension on imaging and history without papilledema not meeting criteria for IIH  DESTINEY on CPAP (REM 47)  She reports a long history of headaches and migraines dating back to her teens and early 25s that seemed to significantly improve following diagnosis and treatment of DESTINEY in the . Initially, she felt that was the last time she had headaches, but in recollection she recalls she did follow with a neurologist in [facility] in the early  and did round round of Botox for chronic migraine. She believes migraines may have improved following menopause. On 3/22/2023, she woke up with a headache and was playing cards when she noticed unilateral peripheral vision loss on the left, unclear if she woke up with that as well, but it persisted and she went to the emergency department where she was admitted for stroke evaluation and found not to have had a stroke. MRI brain was unremarkable for acute pathology, as was CTA head and neck, carotid ultrasound, TTE, and she was already on aspirin which was continued as well as risk factor control.   She followed up with eye doctor last month and no pertinent issues were found except for early macular degeneration. She reports the pain was right temporal and therefore ESR and CRP were also obtained which were only slightly elevated and temporal ultrasound was unremarkable as well.  -As of 5/2/2023: She reports no further migraines, no further vision changes. We discussed most likely etiology could be either suboptimally treated DESTINEY contributing to exacerbation of migraine or related to the hypercalcemia. She mentions some mild memory issues which are not atypical for age and [address] today was normal.  - as of 6/29/2023: She returns sooner than expected after an incident on 6/6/2023 as detailed in HPI where she had left hand paresthesias that radiate up to the left neck and then down the left side of the body lasting approximately 20 minutes not associated with headache. She was admitted to the hospital for MRI brain and MRA head were unremarkable for acute pathology and we discussed for my review she does appear to have subtle signs of idiopathic intracranial hypertension and I suggest trial of acetazolamide/Diamox to see if this is the cause of the visual issues she has been following with 2 eye doctors for despite no papilledema. She is using her CPAP and AHI is less than 1 without leak. Following up with nephrology for hypercalcemia. We discussed that I am not convinced the episode was a TIA, but since the inpatient neurologist recommended transition to Plavix, I would not disagree with their recommendations out of precaution. - as of 11/7/2023: She reports doing very well on acetazolamide/Diamox 250 mg twice daily with only 1 headache, if that, hanny month that is not that severe and more of a pressure sensation that acetaminophen helps. She continues to use her CPAP anytime she is sleeping and we will try and find the old sleep records as she wonders what her AHI was.   She is following closely with ophthalmology every 6 months to different specialists and no papilledema noted. No return of the symptoms that admitted her in June. Workup:  - Neurologic exam was unremarkable for concerning findings, MOCA normal 27/30 (-2 recall, -1 repeat sentence)   -MRI brain pituitary with and without contrast 3/22/2023: 1. Unremarkable exam of the pituitary. No evidence of lesion. 2.  Mild microangiopathic change. No evidence of acute infarct, intracranial hemorrhage or mass.  -Carotid ultrasound 3/22/2023: There is no evidence of giant cell arteritis, aneurysm, or significant stenosis  noted in the superficial temporal artery and its branches  -CTA head and neck with and without contrast 3/22/2023: No significant stenosis of the cervical carotid or vertebral arteries. No significant intracranial stenosis, large vessel occlusion or aneurysm.  - MRI brain without contrast 6/7/2023: 1. No acute infarction. 2. Mild chronic microvascular changes. Remote white matter change just   lateral to the atrium of left lateral ventricle. 3. Tiny susceptible focus, left temporal lobe, as described (It likely reflects a remote petechial hemorrhage or  small vascular calcification). Neither remote   petechial hemorrhage or small vascular calcification. 4. No mass effect or midline shift. *Above per radiology, as of my retrospective review 11/7/2023 we discussed she has a partially empty sella but nearly completely empty, right greater than left optic nerve sheath prominence with slight tortuosity, ventricles are not extremely tight although if so a little bit on the right, cerebellar tonsils overlie the foramen magnum without Chiari   - MRA brain 6/7/2023: Evaluation of the petrous, cavernous and supraclinoid portions of the distal internal carotid arteries as well as the anterior cerebral and middle   cerebral arteries is unremarkable without evidence of aneurysm, stenosis or   major vessel occlusion.      -MRA neck and carotid ultrasound 6/7/2023: see diagnostics for details    Preventative:  - we discussed headache hygiene and lifestyle factors that may improve headaches  -   acetaZOLAMIDE (DIAMOX) 250 mg BID. Discussed proper use, possible side effects and risks. - Currently on through other providers: Losartan/Cozaar 50 mg daily, vitamin D 2000 units daily, aspirin 81 mg daily switched to plavix in June 2023, potassium started by nephology   - Past/ failed/contraindicated: Vasotec  - future options: Topiramate although with caution due to history of kidney stones, verapamil, CGRP med, botox    Rescue:  - recommend not taking over-the-counter or prescription pain medications more than 3 days per week to prevent medication overuse/rebound headache  - Prescription abortive not indicated at this time, we discussed most likely thing to help for prolonged episode in the future would be steroids if nephrology would be agreeable for short course  - Currently on through other providers: OTC meds  - Past/ failed/contraindicated: Fioricet  - future options:  Triptan, prochlorperazine, Toradol IM or p.o., could consider trial of 5 days of Depakote 500 mg nightly or dexamethasone 2 mg daily for prolonged migraine, ubrelvy, reyvow, nurtec      Patient instructions      Continue to follow regularly with eye doctor    continue to pursue the high calcium which could contribute to symptoms as well   -Sounds like they do not think it is primary hyperparathyroidism    Discuss with nephrology whether they would be ok with 1-5 days of steroids if needed to break this in the future. Headache/migraine treatment:   Rescue medications (for immediate treatment of a headache): It is ok to take acetaminophen  if they help your headaches you should limit these to No more than 3 times a week to avoid medication overuse/rebound headaches.      "exedrin tension" - caffeine/tylenol       Prescription preventive medications for headaches/migraines   (to take every day to help prevent headaches - not to take at the time of headache):  [x] we have options if needed      -     acetaZOLAMIDE (DIAMOX) 250 mg in am and in pm      If ever needed you could absolutely add 125 mg or 250 mg twice during the day time between these doses if you feel symptoms are ever worse midday. Certainly if you happen to awaken at night you absolutely could take 125 mg or 250 mg overnight as well    If you were to increase these medications and if needed there is a longer acting form at 500 mg twice a day. - if a lower dose is helpful, can stay lower, if higher dose causes side effects, go back to last tolerated dose. If you get all the way up to the dose recommended and is not helping enough let me know as I will absolutely go higher.    - make sure to stay hydrated while on this as can cause dehydration since that is it's purpose to take fluid off.   - most common side effect is tingling of the nerves at times  - can cause electrolyte disturbances, but not typically in any significant way. However, be cautious if taking with other meds that lower potassium like hydrochlorothiazide etc    *Typically these types of medications take time until you see the benefit, although some may see improvement in days, often it may take weeks, especially if the medication is being titrated up to a beneficial level. Please contact us if there are any concerns or questions regarding the medication. Lifestyle Recommendations:  [x] SLEEP - Maintain a regular sleep schedule: Adults need at least 7-8 hours of uninterrupted a night. Maintain good sleep hygiene:  Going to bed and waking up at consistent times, avoiding excessive daytime naps, avoiding caffeinated beverages in the evening, avoid excessive stimulation in the evening and generally using bed primarily for sleeping. One hour before bedtime would recommend turning lights down lower, decreasing your activity (may read quietly, listen to music at a low volume).  When you get into bed, should eliminate all technology (no texting, emailing, playing with your phone, iPad or tablet in bed). [x] HYDRATION - Maintain good hydration. Drink  2L of fluid a day (4 typical small water bottles)  [x] DIET - Maintain good nutrition. In particular don't skip meals and try and eat healthy balanced meals regularly. [x] TRIGGERS - Look for other triggers and avoid them: Limit caffeine to 1-2 cups a day or less. Avoid dietary triggers that you have noticed bring on your headaches (this could include aged cheese, peanuts, MSG, aspartame and nitrates). [x] EXERCISE - physical exercise as we all know is good for you in many ways, and not only is good for your heart, but also is beneficial for your mental health, cognitive health and  chronic pain/headaches. I would encourage at the least 5 days of physical exercise weekly for at least 30 minutes. Education and Follow-up  [x] Please call with any questions or concerns. Of course if any new concerning symptoms go to the emergency department. [x] Follow up 3-4 months and if everything is exactly the same certainly you could push it back to 6-months, sooner if needed       CC: We had the pleasure of evaluating Aruna Jeff in neurological consultation today. Aruna Jeff is a   right handed female who presents today for evaluation of headaches. History obtained from patient as well as available medical record review. History of Present Illness:   Interval history as of 11/7/2023  - no significant new or concerning neurologic symptoms since last visit   -5/9/2023 she followed up with pulm for DESTINEY - "she is wearing her CPAP all night every night.   When she wears it her AHI is less than 1 and she has no leak"  - Ophthalmology 10/6/2023 no papilledema, drusen - now sees DM and retina specialist ever since the episode due to peripheral vision - seeing one or the other every 6 months   - cut back on coffee too  - works hard cleaning houses to stay active   -She reports she feels done more for her to help her than some the other doctors which is very sweet    Headaches and migraines   She reports doing very well on acetazolamide/Diamox with only 1 headache a month that is not that severe and more of a pressure sensation that acetaminophen helps. If that     Preventative:   Trial of acetazolamide/Diamox 250 mg twice daily- 630-930 am and 6-8 pm -she has not noticed any wearing off effect midday - tingling occasionally and not bothersome    - Currently on through other providers: Losartan/Cozaar 50 mg daily, vitamin D 2000 units daily, aspirin 81 mg daily switched to plavix in June 2023, potassium started by nephology     Abortive:   - acetaminophen helps  Denies bothersome side effects      Interval history as of 6/29/2023  -5/9/2023 she followed up with pulm for DESTINEY - "she is wearing her CPAP all night every night. When she wears it her AHI is less than 1 and she has no leak"  -5/19/2023 followed up with PCP - changed to [facility]   -6/1/2023 followed up with nephrology - 24 hour urine study   -6/15/2023 followed up with PCP -    Regular eye doctor and then retina specialist soon to follow up - Oct 6th     Vacation home up there in the [facility] if why they were up there    On 6/6/2023, she was awake already around 7:30 am and folding clothes and felt numb in the whole hand and ran some water under the hand and was massaging it and then it went up to the left neck and then the left breast and down the side, and woke up  and then drove in   lasted approximately 20 minutes and then resolved. 30 min drive    They consulted with a neurologist via telemedicine and was transferred to another facility to get MRI brain and MRA head.     Per discharge summary, she was eval by neurology and it was thought to be a TIA and she was started on dual antiplatelet therapy with aspirin Plavix for 3 weeks and they recommended continuing Plavix thereafter since they believe she failed aspirin therapy. Headaches and migraines   None, first started March peripheral vision and now having floaters over the past week, first mentioned yesterday      History as of initial visit 5/2/2023:  Admitted 3/22/2023 to 3/23/23  -She presented to the emergency department with headache and was playing cards and noticed peripheral vision loss on the left side was gone (unsure when started and lasted into the night) with a right temporal with the left peripheral vision loss. Woke up with it, tried tylenol without relief, went to /76, NIHSS 1, MRI Brain, CTA h/N, carotid US, mildly elevated CRP, ESR and temporal US unremarakable, A1c 6.5, TTE ok 65%, migraine resolved the next am. Migraine cocktail did not help in hospital    -MRI brain pituitary with and without contrast 3/22/2023: 1. Unremarkable exam of the pituitary. No evidence of lesion. 2.  Mild microangiopathic change. No evidence of acute infarct, intracranial hemorrhage or mass.  -Carotid ultrasound 3/22/2023: There is no evidence of giant cell arteritis, aneurysm, or significant stenosis  noted in the superficial temporal artery and its branches. -CTA head and neck with and without contrast 3/22/2023: No significant stenosis of the cervical carotid or vertebral arteries. No significant intracranial stenosis, large vessel occlusion or aneurysm.    -Further episodes since hospitalization and blood pressure runs 130s over 60s to 70s    Memory  Occasionally repeating things that were already said  Forgetfulness with phone, keys, wallet  No family history of dementia   No driving safety issues       Headaches started at what age?  Teens, early 25s   How often do the headaches occur?   -Has a history of headaches in the past that seemed to resolve since using CPAP 1990s  - not had headaches in a long time since likely 1990s she had thought but on second thought did see someone in [facility] early 2000s and did one round of botox  - as of 5/2/2023: no further episodes     Aura? Without      Last eye exam: in April 2023 saw retina specialist and no issues   Early mac degeneration     Where is your headache located and pain quality?   - right temple dull pain constant     What is the intensity of pain? Average: 8/10, worst likely 10 a long time ago/10  Associated symptoms:   [] Nausea       [] Vomiting         [] Photophobia     []Phonophobia      [] Osmophobia  [] Blurred vision - left side unsure if just left eye or both  [] Light-headed or dizzy   - infrequent, mild   [] Tinnitus   [] Hands or feet tingle or feel numb/paresthesias    [] Ptosis      [] Facial droop  [] Lacrimation  [] Nasal congestion/rhinorrhea        Have you seen someone else for headaches or pain? Yes, PCP, neurology   Have you had trigger point injection performed and how often? No  Have you had Botox injection performed and how often? Yes - once on the forehead  Have you had epidural injections or transforaminal injections performed? no  Are you current pregnant or planning on getting pregnant? Menopause unknown due to partial hysterectomy   Have you ever had any Brain imaging? yes    What medications do you take or have you taken for your headaches? ABORTIVE/pertinent p.r.n.  Meds:      Not much     Past   fioricet    PREVENTIVE/pertinent daily meds:       Losartan/Cozaar 50 mg - years   Vitamin D 2000 units daily  Aspirin 81 mg daily    Past/ failed/contraindicated:  vasotec       LIFESTYLE  Sleep   - averages: 7-8 hours and naps       The following portions of the patient's history were reviewed and updated as appropriate: allergies, current medications, past family history, past medical history, past social history, past surgical history and problem list.    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     Diabetes mellitus (HCC)     GERD (gastroesophageal reflux disease)     Renal disorder     Sleep apnea        Patient Active Problem List   Diagnosis    Essential hypertension    Hyperlipidemia    Type 2 diabetes mellitus without complication, without long-term current use of insulin (HCC)    Hypercalcemia    Thyroid nodule    Morbid obesity (HCC)    DESTINEY on CPAP    Visual disturbance       Medications:      Current Outpatient Medications   Medication Sig Dispense Refill    acetaZOLAMIDE (DIAMOX) 250 mg tablet Take 1 tab twice a day 180 tablet 3    Cholecalciferol (Vitamin D3) 50 MCG (2000 UT) capsule Take 2,000 Units by mouth every other day      ciclopirox (PENLAC) 8 % solution Apply 1 Application topically daily at bedtime      clopidogrel (PLAVIX) 75 mg tablet Take 75 mg by mouth daily      ferrous sulfate 325 (65 FE) MG EC tablet Take 325 mg by mouth Two times a week      halobetasol (ULTRAVATE) 0.05 % ointment Apply topically if needed      losartan (COZAAR) 50 mg tablet TAKE 1 TABLET BY MOUTH EVERY DAY 90 tablet 1    metFORMIN (GLUCOPHAGE) 1000 MG tablet TAKE 1 TABLET BY MOUTH TWICE A DAY WITH MEALS 180 tablet 3    Multiple Vitamins-Minerals (PreserVision AREDS 2+Multi Vit) CAPS Take 1 tablet by mouth 2 (two) times a day      pantoprazole (PROTONIX) 40 mg tablet TAKE 1 TABLET BY MOUTH EVERY DAY 90 tablet 5    Potassium Citrate ER 15 MEQ (1620 MG) TBCR Take 15 mEq by mouth 2 (two) times a day      simvastatin (ZOCOR) 40 mg tablet Take 40 mg by mouth every evening      Multiple Vitamins-Minerals (PRESERVISION AREDS PO) Take by mouth 2 (two) times a day      zoledronic acid (Reclast) 5 mg/100 mL IV infusion (premix) as directed Intravenous       No current facility-administered medications for this visit. Allergies:       Allergies   Allergen Reactions    Demerol [Meperidine] GI Intolerance Other reaction(s): N/V    Milk-Related Compounds - Food Allergy Sneezing    Morphine GI Intolerance     Other reaction(s): N/V    Farxiga [Dapagliflozin] Other (See Comments)     Tongue burning    Meloxicam Swelling       Family History:     Family History   Problem Relation Age of Onset    COPD Mother     Lung cancer Mother     Lung cancer Father     Hypertension Father     No Known Problems Sister     Hypertension Brother     Colon polyps Brother     Kidney disease Brother     Colon polyps Brother     Stroke Brother     Kidney disease Brother     Hypertension Brother     Hyperlipidemia Brother     Gestational diabetes Sister     Colon cancer Maternal Aunt        Social History:     Social History     Socioeconomic History    Marital status: /Civil Union     Spouse name: Not on file    Number of children: Not on file    Years of education: Not on file    Highest education level: Not on file   Occupational History    Not on file   Tobacco Use    Smoking status: Former     Packs/day: 0.50     Years: 8.00     Total pack years: 4.00     Types: Cigarettes     Start date:      Quit date: 10/29/1981     Years since quittin.0    Smokeless tobacco: Never   Vaping Use    Vaping Use: Never used   Substance and Sexual Activity    Alcohol use: Never    Drug use: Not Currently    Sexual activity: Not on file   Other Topics Concern    Not on file   Social History Narrative    Not on file     Social Determinants of Health     Financial Resource Strain: Not on file   Food Insecurity: Not on file   Transportation Needs: Not on file   Physical Activity: Not on file   Stress: Not on file   Social Connections: Not on file   Intimate Partner Violence: Not on file   Housing Stability: Not on file         Objective:       Physical Exam:                                                                 Vitals:            Constitutional:    /62 (BP Location: Right arm, Patient Position: Sitting, Cuff Size: Standard) Pulse 74   Temp (!) 96.5 °F (35.8 °C) (Tympanic)   Ht 5' 5" (1.651 m)   Wt 89.8 kg (198 lb)   BMI 32.95 kg/m²   BP Readings from Last 3 Encounters:   11/07/23 132/62   06/29/23 138/73   05/09/23 132/78     Pulse Readings from Last 3 Encounters:   11/07/23 74   06/29/23 77   05/09/23 72         Well developed, well nourished, well groomed. Psychiatric:  Normal behavior and appropriate affect        Neurological Examination:     Mental status/cognitive function:   Attention span and concentration as well as fund of knowledge are appropriate for age. Normal language and spontaneous speech. Cranial Nerves:   VII-facial expression symmetric  Motor Exam: symmetric bulk throughout. no atrophy, fasciculations or abnormal movements noted. Coordination:  no apparent dysmetria, ataxia or tremor noted  Gait: steady casual gait         Pertinent lab results:   See EMR for recent labs  -3/22/2023 CBC significant for hemoglobin 10.4 MCV 92  BMP unremarkable  LDL 52  ESR 41  CRP 11.7  A1c 6.5  10/27/2022 CMP remarkable for glucose 126, calcium 10.6  9/22/2021 vitamin D  TSH normal  PTH 32 with calcium of 10.4     Imaging: I have personally reviewed imaging and radiology read      - MRI brain without contrast 6/7/2023: 1. No acute infarction. 2. Mild chronic microvascular changes. Remote white matter change just   lateral to the atrium of left lateral ventricle. 3. Tiny susceptible focus, left temporal lobe, as described (It likely reflects a remote petechial hemorrhage or  small vascular calcification). Neither remote   petechial hemorrhage or small vascular calcification. 4. No mass effect or midline shift.    *Above per radiology, as of my retrospective review 11/7/2023 we discussed she has a partially empty sella but nearly completely empty, right greater than left optic nerve sheath prominence with slight tortuosity, ventricles are not extremely tight although if so a little bit on the right, cerebellar tonsils overlie the foramen magnum without Chiari         - MRA brain 6/7/2023: Evaluation of the petrous, cavernous and supraclinoid portions of the    distal internal carotid arteries as well as the anterior cerebral and middle   cerebral arteries is unremarkable without evidence of aneurysm, stenosis or   major vessel occlusion. -MRA neck 6/7/2023:  FINDINGS:   There is antegrade flow in the carotid and vertebral arteries. Aortic Arch and Great Vessel Origins: Widely patent. Subclavian Arteries: Widely patent. Right Common Carotid Artery: Widely patent. Right Internal Carotid Artery: Widely patent. Right External Carotid Artery: Widely patent. Left Common Carotid Artery: Widely patent. Left Internal Carotid Artery: Widely patent. Left External Carotid Artery: Widely patent. Right Vertebral Artery: Widely patent. Left Vertebral Artery: Widely patent. Basilar Artery: Widely patent. The vertebrobasilar junction is tortuous. - Carotid ultrasound 6/7/2023: On the right side there is a mild amount of plaque seen. Peak systolic velocity   in the right internal carotid artery is  measured at 93 cm/sec. Flow in the   right vertebral artery is antegrade in direction. Internal carotid to common   carotid artery velocity ratio measures 1.1. On the left side there is a mild amount of plaque seen. Peak systolic velocity   in left internal carotid artery is measured at 85 cm/sec. Flow in the left   vertebral artery is antegrade in direction. Internal carotid to common carotid   artery velocity ratio measures 1.2. Of note evaluation of the left neck is a heterogeneous appearing left thyroidal   lobe with areas of lucency and some more solid-appearing areas. Further   evaluation with thyroid ultrasound is recommended. -MRI brain pituitary with and without contrast 3/22/2023: 1. Unremarkable exam of the pituitary. No evidence of lesion. 2.  Mild microangiopathic change.   No evidence of acute infarct, intracranial hemorrhage or mass.     -Carotid ultrasound 3/22/2023: There is no evidence of giant cell arteritis, aneurysm, or significant stenosis  noted in the superficial temporal artery and its branches. -CTA head and neck with and without contrast 3/22/2023: No significant stenosis of the cervical carotid or vertebral arteries. No significant intracranial stenosis, large vessel occlusion or aneurysm. -CTA head and neck with and without contrast for dizziness 8/3/2021: 1. No evidence of acute intracranial hemorrhage. 2. No evidence of hemodynamic significant stenosis, aneurysm or dissection. Review of Systems:   ROS obtained by medical assistant and personally reviewed, but if any symptoms listed below say negative, does not mean patient has not had this symptom since last visit, please see HPI for details of symptoms discussed this visit. I recommended PCP follow up for non neurologic problems. Review of Systems   Constitutional:  Negative for appetite change and fever. HENT: Negative. Negative for hearing loss, tinnitus, trouble swallowing and voice change. Eyes: Negative. Negative for photophobia and pain. Respiratory: Negative. Negative for shortness of breath. Cardiovascular: Negative. Negative for palpitations. Gastrointestinal: Negative. Negative for nausea and vomiting. Endocrine: Negative. Negative for cold intolerance. Genitourinary: Negative. Negative for dysuria, frequency and urgency. Musculoskeletal: Negative. Negative for myalgias and neck pain. Skin: Negative. Negative for rash. Neurological:  Positive for headaches. Negative for dizziness, tremors, seizures, syncope, facial asymmetry, speech difficulty, weakness, light-headedness and numbness. Hematological: Negative. Does not bruise/bleed easily. Psychiatric/Behavioral: Negative. Negative for confusion, hallucinations and sleep disturbance.     All other systems reviewed and are negative. I have spent 33  minutes with Patient  today in which greater than 50% of this time was spent in counseling/coordination of care regarding Diagnostic results, Prognosis, Risks and benefits of tx options, Instructions for management, Importance of tx compliance, Risk factor reductions, Impressions, Counseling / Coordination of care, Documenting in the medical record, Reviewing / ordering tests, medicine, procedures  , and Obtaining or reviewing history  . I also spent 8 minutes non face to face for this patient the same day.        Author:  Judit Parrish MD 11/7/2023 11:19 AM

## 2023-11-20 ENCOUNTER — TELEPHONE (OUTPATIENT)
Dept: NEUROLOGY | Facility: CLINIC | Age: 68
End: 2023-11-20

## 2023-11-20 NOTE — TELEPHONE ENCOUNTER
11/16 at 4:21 pm:    Hello, my name is Mohan Meza. My YOB: 1955. I just had some blood work done by my nephrologist. And she called to let me know that my diamox has changed some of my blood work so that I should have diamox reduced or stopped by the neurologist. Just so I wanted to let Dr. Edmund Sawyer know that, and maybe we can make a change, and I guess it's necessary to make a change at this point. So please call me back and let me know what can be done. My number is [phone]. Thank you.  _____________________________________________________________________________    Per Care Everywhere:      MD Shira Pacheco LPN  Please call the patient regarding her abnormal result. Bicarb has dropped further  Likely from diamox prescribed for intracranial HTN  She should reach out to the prescribing provider  This likely needs to be stopped or at least reduced  Electronically signed by Shira Vargas LPN at 86/07/6305 0:60 PM EST    __________________________________________________________________________    Routed to Dr. Edmund Sawyer to advise and clinical team to follow up with pt.

## 2023-11-20 NOTE — TELEPHONE ENCOUNTER
It looks like labs are ever so slightly off which is common on this medication. Certainly if nephrology is concerned, you can decrease the dose and see how you do.      I would change from 250 mg twice a day to     125 mg in a.m. and 250 mg in p.m. for 2 weeks and if this works well could stay at this dose and see what recheck of labs show    If she would prefer she could then go down to 125 mg in a.m. and 125 mg in p.m. and stay there, hoping that this low of a dose also helps with symptoms and see what recheck of labs shows

## 2024-03-06 ENCOUNTER — TELEPHONE (OUTPATIENT)
Dept: NEUROLOGY | Facility: CLINIC | Age: 69
End: 2024-03-06

## 2024-03-06 NOTE — TELEPHONE ENCOUNTER
Called patient and left voicemail to confirm their upcoming appointment with Dr. Magallanes. Informed patient about arriving in the [place] location 15 minutes prior to appointment to get checked in and go over chart.

## 2024-03-18 ENCOUNTER — OFFICE VISIT (OUTPATIENT)
Dept: NEUROLOGY | Facility: CLINIC | Age: 69
End: 2024-03-18
Payer: COMMERCIAL

## 2024-03-18 VITALS
TEMPERATURE: 98.3 F | SYSTOLIC BLOOD PRESSURE: 129 MMHG | WEIGHT: 198 LBS | HEIGHT: 65 IN | DIASTOLIC BLOOD PRESSURE: 63 MMHG | HEART RATE: 67 BPM | BODY MASS INDEX: 32.99 KG/M2

## 2024-03-18 DIAGNOSIS — G43.009 MIGRAINE WITHOUT AURA AND WITHOUT STATUS MIGRAINOSUS, NOT INTRACTABLE: Primary | ICD-10-CM

## 2024-03-18 PROCEDURE — 99215 OFFICE O/P EST HI 40 MIN: CPT | Performed by: PSYCHIATRY & NEUROLOGY

## 2024-03-18 RX ORDER — FAMOTIDINE 40 MG/1
40 TABLET, FILM COATED ORAL
COMMUNITY
Start: 2024-02-19 | End: 2024-03-18 | Stop reason: ALTCHOICE

## 2024-03-18 RX ORDER — ONDANSETRON 8 MG/1
8 TABLET, ORALLY DISINTEGRATING ORAL EVERY 8 HOURS PRN
COMMUNITY
Start: 2024-01-10

## 2024-03-18 RX ORDER — FLUTICASONE PROPIONATE 50 MCG
1 SPRAY, SUSPENSION (ML) NASAL DAILY
COMMUNITY

## 2024-03-18 RX ORDER — GABAPENTIN 100 MG/1
100 CAPSULE ORAL DAILY
COMMUNITY
Start: 2024-02-27

## 2024-03-18 NOTE — PROGRESS NOTES
[facility] Neurology Concussion/Headache Center Consult - Follow up   PATIENT:  Yun Simmons  MRN:  [id number]  :  1955  DATE OF SERVICE:  3/18/2024  REFERRED BY: No ref. provider found  PMD: Jose Fernandez MD    Assessment/Plan:   Yun Simmons is a delightful 68 y.o. female with a past medical history that includes hypercalcemia, type 2 diabetes mellitus, thyroid versus parathyroid nodule, DESTINEY on CPAP (), hypertension, postural hypotension, normocytic anemia, hyperlipidemia,  headaches, BMI over 30, visual disturbance, autosomal dominant polycystic kidney disease*, kidney stone, s/p shoulder surgery referred here for evaluation of headache.  My initial evaluation 2023     Migraine without aura and without status migrainosus, not intractable  Visual disturbance-follows with 2 eye specialists  Features of idiopathic intracranial hypertension on imaging and history without papilledema not meeting criteria for IIH  DESTINEY on CPAP (REM 47)  She reports a long history of headaches and migraines dating back to her teens and early 20s that seemed to significantly improve following diagnosis and treatment of DESTINEY in the .  Initially, she felt that was the last time she had headaches, but in recollection she recalls she did follow with a neurologist in [place] in the early  and did round round of Botox for chronic migraine.  She believes migraines may have improved following menopause.  On 3/22/2023, she woke up with a headache and was playing cards when she noticed unilateral peripheral vision loss on the left, unclear if she woke up with that as well, but it persisted and she went to the emergency department where she was admitted for stroke evaluation and found not to have had a stroke.  MRI brain was unremarkable for acute pathology, as was CTA head and neck, carotid ultrasound, TTE, and she was already on aspirin which was continued as well as risk factor control.  She followed  up with eye doctor last month and no pertinent issues were found except for early macular degeneration.  She reports the pain was right temporal and therefore ESR and CRP were also obtained which were only slightly elevated and temporal ultrasound was unremarkable as well.  -As of 5/2/2023: She reports no further migraines, no further vision changes.  We discussed most likely etiology could be either suboptimally treated DESTINEY contributing to exacerbation of migraine or related to the hypercalcemia.  She mentions some mild memory issues which are not atypical for age and MOCA today was normal.  - as of 6/29/2023: She returns sooner than expected after an incident on 6/6/2023 as detailed in HPI where she had left hand paresthesias that radiate up to the left neck and then down the left side of the body lasting approximately 20 minutes not associated with headache.  She was admitted to the hospital for MRI brain and MRA head were unremarkable for acute pathology and we discussed for my review she does appear to have subtle signs of idiopathic intracranial hypertension and I suggest trial of acetazolamide/Diamox to see if this is the cause of the visual issues she has been following with 2 eye doctors for despite no papilledema.  She is using her CPAP and AHI is less than 1 without leak.  Following up with nephrology for hypercalcemia.  We discussed that I am not convinced the episode was a TIA, but since the inpatient neurologist recommended transition to Plavix, I would not disagree with their recommendations out of precaution.    - as of 11/7/2023: She reports doing very well on acetazolamide/Diamox 250 mg twice daily with only 1 headache, if that, in a month that is not that severe and more of a pressure sensation that acetaminophen helps.  She continues to use her CPAP anytime she is sleeping and we will try and find the old sleep records as she wonders what her AHI was.  She is following closely with ophthalmology  every 6 months to different specialists and no papilledema noted.  No return of the symptoms that admitted her in June.    - as of 3/18/2024: She reports headaches have resolved on acetazolamide 125 mg twice daily which she decreased from 250 mg twice daily around November per PCP recommendations due to chloride being elevated by 1 number at 107 and we discussed certainly if needed could increase the medication again although would recommend every 4 hours rather than just twice a day as I suspect she may have wearing off midday.  Recommended resuming PreserVision through eye doctor for macular degeneration which she recently discontinued and she is having more floaters, follows closely with two eye doctors.  She also stopped iron recently which we discussed could have a negative impact on sleep.  Otherwise she is doing excellent using her CPAP every night all night.    Workup:  - MOCA normal 27/30 (-2 recall, -1 repeat sentence)   -MRI brain pituitary with and without contrast 3/22/2023: 1.  Unremarkable exam of the pituitary.  No evidence of lesion.  2.  Mild microangiopathic change.  No evidence of acute infarct, intracranial hemorrhage or mass.  -Carotid ultrasound 3/22/2023: There is no evidence of giant cell arteritis, aneurysm, or significant stenosis  noted in the superficial temporal artery and its branches  -CTA head and neck with and without contrast 3/22/2023: No significant stenosis of the cervical carotid or vertebral arteries.  No significant intracranial stenosis, large vessel occlusion or aneurysm.  - MRI brain without contrast 6/7/2023: 1. No acute infarction.   2. Mild chronic microvascular changes. Remote white matter change just   lateral to the atrium of left lateral ventricle.   3. Tiny susceptible focus, left temporal lobe, as described (It likely reflects a remote petechial hemorrhage or  small vascular calcification). Neither remote   petechial hemorrhage or small vascular calcification.    4. No mass effect or midline shift.   *Above per radiology, as of my retrospective review 11/7/2023 we discussed she has a partially empty sella but nearly completely empty, right greater than left optic nerve sheath prominence with slight tortuosity, ventricles are not extremely tight although if so a little bit on the right, cerebellar tonsils overlie the foramen magnum without Chiari   - MRA brain 6/7/2023: Evaluation of the petrous, cavernous and supraclinoid portions of the distal internal carotid arteries as well as the anterior cerebral and middle   cerebral arteries is unremarkable without evidence of aneurysm, stenosis or   major vessel occlusion.     -MRA neck and carotid ultrasound 6/7/2023: negative    Preventative:  - we discussed headache hygiene and lifestyle factors that may improve headaches  -   acetaZOLAMIDE (DIAMOX) 125 mg BID. -Although I would recommend considering taking it 125 mg every 4 hours as I do suspect you may have wearing off during the daytime. discussed proper use, possible side effects and risks.   - Currently on through other providers: Losartan/Cozaar 50 mg daily, vitamin D 2000 units daily, aspirin 81 mg daily switched to plavix in June 2023, potassium started by nephology 15 mEq twice daily does not like the large pill, currently not taking the PreserVision for her eyes, currently you not taking iron prescribed by PCP but will if PCP recommends taking it again  - Past/ failed/contraindicated: Vasotec, gabapentin 100 mg   - future options: Topiramate although with caution due to history of kidney stones, verapamil, CGRP med, botox    Rescue:  - recommend not taking over-the-counter or prescription pain medications more than 3 days per week to prevent medication overuse/rebound headache  - Prescription abortive not indicated at this time, we discussed most likely thing to help for prolonged episode in the future would be steroids if nephrology would be agreeable for short  "course  - Currently on through other providers: OTC meds  - Past/ failed/contraindicated: Fioricet  -Past/tolerated: PCP gave her short dose of steroids for her current back pain  - future options:  Triptan, prochlorperazine, Toradol IM or p.o., could consider trial of 5 days of Depakote 500 mg nightly or dexamethasone 2 mg daily for prolonged migraine, ubrelvy, reyvow, nurtec      Patient instructions        Continue to follow regularly with eye doctor    Consider acupuncture if physical therapy does not help your back enough     Glad that your nephrologist has ruled out primary hyperparathyroidism    Discuss with nephrology whether they would be ok with 1-5 days of steroids if needed to break this in the future.       Headache/migraine treatment:   Rescue medications (for immediate treatment of a headache):   It is ok to take acetaminophen  if they help your headaches you should limit these to No more than 3 times a week to avoid medication overuse/rebound headaches.     \"exedrin tension\" - caffeine/tylenol       Prescription preventive medications for headaches/migraines   (to take every day to help prevent headaches - not to take at the time of headache):  [x] we have options if needed      -     acetaZOLAMIDE (DIAMOX) 125 mg in am and in pm      If ever needed you could absolutely add 125 mg or 250 mg twice during the day time between these doses if you feel symptoms are ever worse midday.     Certainly if you happen to awaken at night you absolutely could take 125 mg or 250 mg overnight as well    If you were to increase these medications and if needed there is a longer acting form at 500 mg twice a day.      - if a lower dose is helpful, can stay lower, if higher dose causes side effects, go back to last tolerated dose.  If you get all the way up to the dose recommended and is not helping enough let me know as I will absolutely go higher.    - make sure to stay hydrated while on this as can cause dehydration " since that is it's purpose to take fluid off.   - most common side effect is tingling of the nerves at times  - can cause electrolyte disturbances, but not typically in any significant way. However, be cautious if taking with other meds that lower potassium like hydrochlorothiazide etc    *Typically these types of medications take time until you see the benefit, although some may see improvement in days, often it may take weeks, especially if the medication is being titrated up to a beneficial level. Please contact us if there are any concerns or questions regarding the medication.     Lifestyle Recommendations:  [x] SLEEP - Maintain a regular sleep schedule: Adults need at least 7-8 hours of uninterrupted a night. Maintain good sleep hygiene:  Going to bed and waking up at consistent times, avoiding excessive daytime naps, avoiding caffeinated beverages in the evening, avoid excessive stimulation in the evening and generally using bed primarily for sleeping.  One hour before bedtime would recommend turning lights down lower, decreasing your activity (may read quietly, listen to music at a low volume). When you get into bed, should eliminate all technology (no texting, emailing, playing with your phone, iPad or tablet in bed).  [x] HYDRATION - Maintain good hydration.  Drink  2L of fluid a day (4 typical small water bottles)  [x] DIET - Maintain good nutrition. In particular don't skip meals and try and eat healthy balanced meals regularly.  [x] TRIGGERS - Look for other triggers and avoid them: Limit caffeine to 1-2 cups a day or less. Avoid dietary triggers that you have noticed bring on your headaches (this could include aged cheese, peanuts, MSG, aspartame and nitrates).  [x] EXERCISE - physical exercise as we all know is good for you in many ways, and not only is good for your heart, but also is beneficial for your mental health, cognitive health and  chronic pain/headaches. I would encourage at the least 5  "days of physical exercise weekly for at least 30 minutes.     Education and Follow-up  [x] Please call with any questions or concerns. Of course if any new concerning symptoms go to the emergency department.  [x] Follow up 3-6 months and if everything is exactly the same certainly you could push it back to 6-months, sooner if needed       CC:   We had the pleasure of evaluating Yun Simmons in neurological consultation today. Yun Simmons is a   right handed female who presents today for evaluation of headaches.     History obtained from patient as well as available medical record review.  History of Present Illness:   Interval history as of 3/18/2024  - no significant new or concerning neurologic symptoms since last visit   - ENT for post nasal drip and cough and everything clear - famotidine/pepcid and didn't help   - stopped zinc pills for eyes, for mac degeneration - sees more floaters now - a couple of weeks ago - when up in the morning and when going to be (- Ophthalmology 10/6/2023 no papilledema, drusen - now sees DM and retina specialist ever since the episode due to peripheral vision - seeing one or the other every 6 months)  - 6 hours now if doesn't wake up in the middle of the night at 4/5,  CPAP all night every night, naps during the day without machine   - back pain the past 3-4 weeks, started one day for no good reason - if doesn't breathe feels good, but if dont breathe doesn't feel it, given gabapentin and didn't help, PT coming up-we discussed back pain would not be my area of expertise and I am glad she is seeing other providers for it could always consider orthopedics, pain management, physiatry for further evaluation if needed.      Headaches and migraines     She reports no headaches at all really but when pressed reports maybe they occur rarely  For sure less than 1 a month- \"basically none\"      Preventative:   -  acetazolamide/Diamox 125 mg twice daily (anywhere 8-10) and about 6-7 pm at " "night   -she has not noticed any wearing off effect midday - tingling occasionally and not bothersome  Decreased from 250 mg twice a day around November when the chloride came back slightly elevated 107, per PCP,   - was taking iron every other day for a while but now as didn't refill she also discontinued her PreserVision recently  Denies bothersome side effects        Interval history as of 11/7/2023  - no significant new or concerning neurologic symptoms since last visit   -5/9/2023 she followed up with pulm for DESTINEY - \"she is wearing her CPAP all night every night.  When she wears it her AHI is less than 1 and she has no leak\"  - Ophthalmology 10/6/2023 no papilledema, drusen - now sees DM and retina specialist ever since the episode due to peripheral vision - seeing one or the other every 6 months   - cut back on coffee too  - works hard cleaning houses to stay active   -She reports she feels done more for her to help her than some the other doctors which is very sweet    Headaches and migraines   She reports doing very well on acetazolamide/Diamox with only 1 headache a month that is not that severe and more of a pressure sensation that acetaminophen helps. If that     Preventative:   Trial of acetazolamide/Diamox 250 mg twice daily- 630-930 am and 6-8 pm -she has not noticed any wearing off effect midday - tingling occasionally and not bothersome    - Currently on through other providers: Losartan/Cozaar 50 mg daily, vitamin D 2000 units daily, aspirin 81 mg daily switched to plavix in June 2023, potassium started by nephology     Abortive:   - acetaminophen helps  Denies bothersome side effects      Interval history as of 6/29/2023  -5/9/2023 she followed up with pulm for DESTINEY - \"she is wearing her CPAP all night every night.  When she wears it her AHI is less than 1 and she has no leak\"  -5/19/2023 followed up with PCP - changed to jasmin   -6/1/2023 followed up with nephrology - 24 hour urine study "   -6/15/2023 followed up with PCP -    Regular eye doctor and then retina specialist soon to follow up - Oct 6th     Vac[facility] there [facility] the [facility] if why they were up there    On 6/6/2023, she was awake already around 7:30 am and folding clothes and felt numb in the whole hand and ran some water under the hand and was massaging it and then it went up to the left neck and then the left breast and down the side, and woke up  and then drove in   lasted approximately 20 minutes and then resolved.  30 min drive    They consulted with a neurologist via telemedicine and was transferred to another facility to get MRI brain and MRA head.    Per discharge summary, she was eval by neurology and it was thought to be a TIA and she was started on dual antiplatelet therapy with aspirin Plavix for 3 weeks and they recommended continuing Plavix thereafter since they believe she failed aspirin therapy.      Headaches and migraines   None, first started March peripheral vision and now having floaters over the past week, first mentioned yesterday      History as of initial visit 5/2/2023:  Admitted 3/22/2023 to 3/23/23  -She presented to the emergency department with headache and was playing cards and noticed peripheral vision loss on the left side was gone (unsure when started and lasted into the night) with a right temporal with the left peripheral vision loss. Woke up with it, tried tylenol without relief, went to /76, NIHSS 1, MRI Brain, CTA h/N, carotid US, mildly elevated CRP, ESR and temporal US unremarakable, A1c 6.5, TTE ok 65%, migraine resolved the next am. Migraine cocktail did not help in hospital    -MRI brain pituitary with and without contrast 3/22/2023: 1.  Unremarkable exam of the pituitary.  No evidence of lesion.  2.  Mild microangiopathic change.  No evidence of acute infarct, intracranial hemorrhage or mass.  -Carotid ultrasound 3/22/2023: There is no evidence of giant cell arteritis,  aneurysm, or significant stenosis  noted in the superficial temporal artery and its branches.  -CTA head and neck with and without contrast 3/22/2023: No significant stenosis of the cervical carotid or vertebral arteries. No significant intracranial stenosis, large vessel occlusion or aneurysm.    -Further episodes since hospitalization and blood pressure runs 130s over 60s to 70s    Memory  Occasionally repeating things that were already said  Forgetfulness with phone, keys, wallet  No family history of dementia   No driving safety issues       Headaches started at what age? Teens, early 20s   How often do the headaches occur?   -Has a history of headaches in the past that seemed to resolve since using CPAP 1990s  - not had headaches in a long time since likely 1990s she had thought but on second thought did see someone in [place] early 2000s and did one round of botox  - as of 5/2/2023: no further episodes     Aura? Without      Last eye exam: in April 2023 saw retina specialist and no issues   Early mac degeneration     Where is your headache located and pain quality?   - right temple dull pain constant     What is the intensity of pain? Average: 8/10, worst likely 10 a long time ago/10  Associated symptoms:   [] Nausea       [] Vomiting         [] Photophobia     []Phonophobia      [] Osmophobia  [] Blurred vision - left side unsure if just left eye or both  [] Light-headed or dizzy   - infrequent, mild   [] Tinnitus   [] Hands or feet tingle or feel numb/paresthesias    [] Ptosis      [] Facial droop  [] Lacrimation  [] Nasal congestion/rhinorrhea        Have you seen someone else for headaches or pain? Yes, PCP, neurology   Have you had trigger point injection performed and how often? No  Have you had Botox injection performed and how often? Yes - once on the forehead  Have you had epidural injections or transforaminal injections performed? no  Are you current pregnant or planning on getting pregnant?  Menopause unknown due to partial hysterectomy   Have you ever had any Brain imaging? yes    What medications do you take or have you taken for your headaches?   ABORTIVE/pertinent p.r.n. Meds:      Not much     Past   fioricet    PREVENTIVE/pertinent daily meds:       Losartan/Cozaar 50 mg - years   Vitamin D 2000 units daily  Aspirin 81 mg daily    Past/ failed/contraindicated:  vasotec       LIFESTYLE  Sleep   - averages: 7-8 hours and naps       The following portions of the patient's history were reviewed and updated as appropriate: allergies, current medications, past family history, past medical history, past social history, past surgical history and problem list.    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     Diabetes mellitus (HCC)     GERD (gastroesophageal reflux disease)     Renal disorder     Sleep apnea        Patient Active Problem List   Diagnosis    Essential hypertension    Hyperlipidemia    Type 2 diabetes mellitus without complication, without long-term current use of insulin (HCC)    Hypercalcemia    Thyroid nodule    Morbid obesity (HCC)    DESTINEY on CPAP    Visual disturbance       Medications:      Current Outpatient Medications   Medication Sig Dispense Refill    acetaZOLAMIDE (DIAMOX) 250 mg tablet Take 1 tab twice a day (Patient taking differently: Take 125 mg by mouth 2 (two) times a day Take 1 tab twice a day) 180 tablet 3    Cholecalciferol (Vitamin D3) 50 MCG (2000 UT) capsule Take 2,000 Units by mouth every other day      clopidogrel (PLAVIX) 75 mg tablet Take 75 mg by mouth daily      fluticasone (FLONASE) 50 mcg/act nasal spray 1 spray into each nostril daily      halobetasol (ULTRAVATE) 0.05 % ointment Apply  topically if needed      losartan (COZAAR) 50 mg tablet TAKE 1 TABLET BY MOUTH EVERY DAY 90 tablet 1    metFORMIN (GLUCOPHAGE) 1000 MG tablet TAKE 1 TABLET BY MOUTH TWICE A DAY WITH MEALS 180 tablet 3    ondansetron (ZOFRAN-ODT) 8 mg disintegrating tablet Take 8 mg by mouth every 8 (eight) hours as needed      pantoprazole (PROTONIX) 40 mg tablet TAKE 1 TABLET BY MOUTH EVERY DAY 90 tablet 5    Potassium Citrate ER 15 MEQ (1620 MG) TBCR Take 15 mEq by mouth 2 (two) times a day      simvastatin (ZOCOR) 40 mg tablet Take 40 mg by mouth every evening      ciclopirox (PENLAC) 8 % solution Apply 1 Application topically daily at bedtime (Patient not taking: Reported on 3/18/2024)      ferrous sulfate 325 (65 FE) MG EC tablet Take 325 mg by mouth Two times a week (Patient not taking: Reported on 3/18/2024)      gabapentin (NEURONTIN) 100 mg capsule Take 100 mg by mouth daily (Patient not taking: Reported on 3/18/2024)      Multiple Vitamins-Minerals (PreserVision AREDS 2+Multi Vit) CAPS Take 1 tablet by mouth 2 (two) times a day (Patient not taking: Reported on 3/18/2024)      Multiple Vitamins-Minerals (PRESERVISION AREDS PO) Take by mouth 2 (two) times a day (Patient not taking: Reported on 3/18/2024)      zoledronic acid (Reclast) 5 mg/100 mL IV infusion (premix) as directed Intravenous (Patient not taking: Reported on 3/18/2024)       No current facility-administered medications for this visit.        Allergies:      Allergies   Allergen Reactions    Demerol [Meperidine] GI Intolerance     Other reaction(s): N/V    Milk-Related Compounds - Food Allergy Sneezing    Morphine GI Intolerance     Other reaction(s): N/V    Farxiga [Dapagliflozin] Other (See Comments)     Tongue burning    Meloxicam Swelling       Family History:     Family History   Problem Relation Age of Onset    COPD Mother     Lung cancer Mother     Lung cancer Father     Hypertension Father     No Known Problems Sister     Hypertension Brother     Colon  "polyps Brother     Kidney disease Brother     Colon polyps Brother     Stroke Brother     Kidney disease Brother     Hypertension Brother     Hyperlipidemia Brother     Gestational diabetes Sister     Colon cancer Maternal Aunt        Social History:     Social History     Socioeconomic History    Marital status: /Civil Union     Spouse name: Not on file    Number of children: Not on file    Years of education: Not on file    Highest education level: Not on file   Occupational History    Not on file   Tobacco Use    Smoking status: Former     Current packs/day: 0.00     Average packs/day: 0.5 packs/day for 8.8 years (4.4 ttl pk-yrs)     Types: Cigarettes     Start date:      Quit date: 10/29/1981     Years since quittin.4    Smokeless tobacco: Never   Vaping Use    Vaping status: Never Used   Substance and Sexual Activity    Alcohol use: Never    Drug use: Not Currently    Sexual activity: Not on file   Other Topics Concern    Not on file   Social History Narrative    Not on file     Social Determinants of Health     Financial Resource Strain: Not on file   Food Insecurity: Not on file   Transportation Needs: Not on file   Physical Activity: Not on file   Stress: Not on file   Social Connections: Not on file   Intimate Partner Violence: Not on file   Housing Stability: Not on file         Objective:       Physical Exam:                                                                 Vitals:            Constitutional:    /63 (BP Location: Right arm, Patient Position: Sitting, Cuff Size: Standard)   Pulse 67   Temp 98.3 °F (36.8 °C) (Temporal)   Ht 5' 5\" (1.651 m)   Wt 89.8 kg (198 lb)   BMI 32.95 kg/m²   BP Readings from Last 3 Encounters:   24 129/63   23 132/62   23 138/73     Pulse Readings from Last 3 Encounters:   24 67   23 74   23 77         Well developed, well nourished, well groomed.        Psychiatric:  Normal behavior and appropriate affect  "       Neurological Examination:     Mental status/cognitive function:   Recent and remote memory appear intact. Attention span and concentration as well as fund of knowledge are appropriate for age. Normal language and spontaneous speech.  Cranial Nerves:   VII-facial expression symmetric  Motor Exam: symmetric bulk throughout. no atrophy, fasciculations or abnormal movements noted.   Coordination:  no apparent dysmetria, ataxia or tremor noted  Gait: steady casual gait           Pertinent lab results:   See EMR for recent labs  -3/22/2023 CBC significant for hemoglobin 10.4 MCV 92  BMP unremarkable  LDL 52  ESR 41  CRP 11.7  A1c 6.5  10/27/2022 CMP remarkable for glucose 126, calcium 10.6  9/22/2021 vitamin D  TSH normal  PTH 32 with calcium of 10.4     Imaging: I have personally reviewed imaging and radiology read      - MRI brain without contrast 6/7/2023: 1. No acute infarction.   2. Mild chronic microvascular changes. Remote white matter change just   lateral to the atrium of left lateral ventricle.   3. Tiny susceptible focus, left temporal lobe, as described (It likely reflects a remote petechial hemorrhage or  small vascular calcification). Neither remote   petechial hemorrhage or small vascular calcification.   4. No mass effect or midline shift.   *Above per radiology, as of my retrospective review 11/7/2023 we discussed she has a partially empty sella but nearly completely empty, right greater than left optic nerve sheath prominence with slight tortuosity, ventricles are not extremely tight although if so a little bit on the right, cerebellar tonsils overlie the foramen magnum without Chiari         - MRA brain 6/7/2023: Evaluation of the petrous, cavernous and supraclinoid portions of the    distal internal carotid arteries as well as the anterior cerebral and middle   cerebral arteries is unremarkable without evidence of aneurysm, stenosis or   major vessel occlusion.     -MRA neck 6/7/2023:  FINDINGS:    There is antegrade flow in the carotid and vertebral arteries.     Aortic Arch and Great Vessel Origins: Widely patent.   Subclavian Arteries: Widely patent.   Right Common Carotid Artery: Widely patent.   Right Internal Carotid Artery: Widely patent.   Right External Carotid Artery: Widely patent.   Left Common Carotid Artery: Widely patent.   Left Internal Carotid Artery: Widely patent.   Left External Carotid Artery: Widely patent.   Right Vertebral Artery: Widely patent.   Left Vertebral Artery: Widely patent.   Basilar Artery: Widely patent. The vertebrobasilar junction is tortuous.     - Carotid ultrasound 6/7/2023:  On the right side there is a mild amount of plaque seen. Peak systolic velocity   in the right internal carotid artery is  measured at 93 cm/sec. Flow in the   right vertebral artery is antegrade in direction. Internal carotid to common   carotid artery velocity ratio measures 1.1.   On the left side there is a mild amount of plaque seen. Peak systolic velocity   in left internal carotid artery is measured at 85 cm/sec. Flow in the left   vertebral artery is antegrade in direction.  Internal carotid to common carotid   artery velocity ratio measures 1.2.     Of note evaluation of the left neck is a heterogeneous appearing left thyroidal   lobe with areas of lucency and some more solid-appearing areas. Further   evaluation with thyroid ultrasound is recommended.        -MRI brain pituitary with and without contrast 3/22/2023: 1.  Unremarkable exam of the pituitary.  No evidence of lesion.  2.  Mild microangiopathic change.  No evidence of acute infarct, intracranial hemorrhage or mass.     -Carotid ultrasound 3/22/2023: There is no evidence of giant cell arteritis, aneurysm, or significant stenosis  noted in the superficial temporal artery and its branches.     -CTA head and neck with and without contrast 3/22/2023: No significant stenosis of the cervical carotid or vertebral arteries.  No  significant intracranial stenosis, large vessel occlusion or aneurysm.     -CTA head and neck with and without contrast for dizziness 8/3/2021: 1. No evidence of acute intracranial hemorrhage.  2. No evidence of hemodynamic significant stenosis, aneurysm or dissection.     Review of Systems:   ROS obtained by medical assistant and personally reviewed, but if any symptoms listed below say negative, does not mean patient has not had this symptom since last visit, please see HPI for details of symptoms discussed this visit. I recommended PCP follow up for non neurologic problems.       Review of Systems   Constitutional:  Negative for appetite change and fever.   HENT: Negative.  Negative for hearing loss, tinnitus, trouble swallowing and voice change.    Eyes:  Positive for visual disturbance (floaters). Negative for photophobia and pain.   Respiratory: Negative.  Negative for shortness of breath.    Cardiovascular: Negative.  Negative for palpitations.   Gastrointestinal: Negative.  Negative for nausea and vomiting.   Endocrine: Negative.  Negative for cold intolerance.   Genitourinary: Negative.  Negative for dysuria, frequency and urgency.   Musculoskeletal: Negative.  Negative for myalgias and neck pain.   Skin: Negative.  Negative for rash.   Neurological:  Positive for headaches. Negative for dizziness, tremors, seizures, syncope, facial asymmetry, speech difficulty, weakness, light-headedness and numbness.   Hematological: Negative.  Does not bruise/bleed easily.   Psychiatric/Behavioral: Negative.  Negative for confusion, hallucinations and sleep disturbance.    All other systems reviewed and are negative.          I have spent 30 minutes with Patient and family today in which greater than 50% of this time was spent in counseling/coordination of care regarding Prognosis, Risks and benefits of tx options, Instructions for management, Patient and family education, Importance of tx compliance, Risk factor  reductions, Impressions, Counseling / Coordination of care, Documenting in the medical record, Obtaining or reviewing history  , and Communicating with other healthcare professionals . I also spent 12  minutes non face to face for this patient the same day.       Author:  Tiffany Magallanes MD 3/18/2024 6:51 PM

## 2024-03-18 NOTE — PROGRESS NOTES
Review of Systems   Constitutional:  Negative for appetite change and fever.   HENT: Negative.  Negative for hearing loss, tinnitus, trouble swallowing and voice change.    Eyes:  Positive for visual disturbance (floaters). Negative for photophobia and pain.   Respiratory: Negative.  Negative for shortness of breath.    Cardiovascular: Negative.  Negative for palpitations.   Gastrointestinal: Negative.  Negative for nausea and vomiting.   Endocrine: Negative.  Negative for cold intolerance.   Genitourinary: Negative.  Negative for dysuria, frequency and urgency.   Musculoskeletal: Negative.  Negative for myalgias and neck pain.   Skin: Negative.  Negative for rash.   Neurological:  Positive for headaches. Negative for dizziness, tremors, seizures, syncope, facial asymmetry, speech difficulty, weakness, light-headedness and numbness.   Hematological: Negative.  Does not bruise/bleed easily.   Psychiatric/Behavioral: Negative.  Negative for confusion, hallucinations and sleep disturbance.    All other systems reviewed and are negative.

## 2024-03-18 NOTE — PATIENT INSTRUCTIONS
"Continue to follow regularly with eye doctor    Consider acupuncture if physical therapy does not help your back enough     Glad that your nephrologist has ruled out primary hyperparathyroidism    Discuss with nephrology whether they would be ok with 1-5 days of steroids if needed to break this in the future.       Headache/migraine treatment:   Rescue medications (for immediate treatment of a headache):   It is ok to take acetaminophen  if they help your headaches you should limit these to No more than 3 times a week to avoid medication overuse/rebound headaches.     \"exedrin tension\" - caffeine/tylenol       Prescription preventive medications for headaches/migraines   (to take every day to help prevent headaches - not to take at the time of headache):  [x] we have options if needed      -     acetaZOLAMIDE (DIAMOX) 125 mg in am and in pm      If ever needed you could absolutely add 125 mg or 250 mg twice during the day time between these doses if you feel symptoms are ever worse midday.     Certainly if you happen to awaken at night you absolutely could take 125 mg or 250 mg overnight as well    If you were to increase these medications and if needed there is a longer acting form at 500 mg twice a day.      - if a lower dose is helpful, can stay lower, if higher dose causes side effects, go back to last tolerated dose.  If you get all the way up to the dose recommended and is not helping enough let me know as I will absolutely go higher.    - make sure to stay hydrated while on this as can cause dehydration since that is it's purpose to take fluid off.   - most common side effect is tingling of the nerves at times  - can cause electrolyte disturbances, but not typically in any significant way. However, be cautious if taking with other meds that lower potassium like hydrochlorothiazide etc    *Typically these types of medications take time until you see the benefit, although some may see improvement in days, often " it may take weeks, especially if the medication is being titrated up to a beneficial level. Please contact us if there are any concerns or questions regarding the medication.     Lifestyle Recommendations:  [x] SLEEP - Maintain a regular sleep schedule: Adults need at least 7-8 hours of uninterrupted a night. Maintain good sleep hygiene:  Going to bed and waking up at consistent times, avoiding excessive daytime naps, avoiding caffeinated beverages in the evening, avoid excessive stimulation in the evening and generally using bed primarily for sleeping.  One hour before bedtime would recommend turning lights down lower, decreasing your activity (may read quietly, listen to music at a low volume). When you get into bed, should eliminate all technology (no texting, emailing, playing with your phone, iPad or tablet in bed).  [x] HYDRATION - Maintain good hydration.  Drink  2L of fluid a day (4 typical small water bottles)  [x] DIET - Maintain good nutrition. In particular don't skip meals and try and eat healthy balanced meals regularly.  [x] TRIGGERS - Look for other triggers and avoid them: Limit caffeine to 1-2 cups a day or less. Avoid dietary triggers that you have noticed bring on your headaches (this could include aged cheese, peanuts, MSG, aspartame and nitrates).  [x] EXERCISE - physical exercise as we all know is good for you in many ways, and not only is good for your heart, but also is beneficial for your mental health, cognitive health and  chronic pain/headaches. I would encourage at the least 5 days of physical exercise weekly for at least 30 minutes.     Education and Follow-up  [x] Please call with any questions or concerns. Of course if any new concerning symptoms go to the emergency department.  [x] Follow up 3-6 months and if everything is exactly the same certainly you could push it back to 6-months, sooner if needed

## 2024-05-20 NOTE — TELEPHONE ENCOUNTER
Called and advised pt of all of the below. She verbalized clear understanding of all instructions. Heterosexual

## 2024-05-21 LAB
LEFT EYE DIABETIC RETINOPATHY: NORMAL
RIGHT EYE DIABETIC RETINOPATHY: NORMAL

## 2024-06-20 LAB
CREAT ?TM UR-SCNC: 83.4 UMOL/L
EXT ALBUMIN URINE RANDOM: 8.4
HBA1C MFR BLD HPLC: 7.2 %
MICROALBUMIN/CREAT UR: 10 MG/G{CREAT}

## 2024-07-24 ENCOUNTER — TELEPHONE (OUTPATIENT)
Dept: NEUROLOGY | Facility: CLINIC | Age: 69
End: 2024-07-24

## 2024-07-24 NOTE — TELEPHONE ENCOUNTER
Called patient and left voicemail to confirm their upcoming appointment with Dr. Magallanes. Informed patient about arriving in the [facility] location 15 minutes prior to appointment to get checked in and go over chart.

## 2024-07-26 ENCOUNTER — OFFICE VISIT (OUTPATIENT)
Dept: ENDOCRINOLOGY | Facility: HOSPITAL | Age: 69
End: 2024-07-26
Payer: COMMERCIAL

## 2024-07-26 VITALS
HEART RATE: 69 BPM | SYSTOLIC BLOOD PRESSURE: 110 MMHG | HEIGHT: 65 IN | WEIGHT: 196 LBS | BODY MASS INDEX: 32.65 KG/M2 | DIASTOLIC BLOOD PRESSURE: 70 MMHG

## 2024-07-26 DIAGNOSIS — E11.9 TYPE 2 DIABETES MELLITUS WITHOUT COMPLICATION, WITHOUT LONG-TERM CURRENT USE OF INSULIN (HCC): Primary | ICD-10-CM

## 2024-07-26 PROCEDURE — 99214 OFFICE O/P EST MOD 30 MIN: CPT | Performed by: PHYSICIAN ASSISTANT

## 2024-07-26 RX ORDER — TIRZEPATIDE 2.5 MG/.5ML
2.5 INJECTION, SOLUTION SUBCUTANEOUS
COMMUNITY

## 2024-07-26 RX ORDER — FAMOTIDINE 40 MG/1
40 TABLET, FILM COATED ORAL
COMMUNITY
Start: 2024-04-11

## 2024-07-26 NOTE — PATIENT INSTRUCTIONS
Continue to monitor diet, and maintain physical activity.  Make sure to drink plenty water throughout the day.       Continue metformin 1000 mg twice a day, and mounjaro 2.5 mg weekly.       Continue monitoring blood sugar daily.       Ultrasound for thyroid will be due next year.    Follow up in 3 months with lab work prior to visit.

## 2024-07-26 NOTE — PROGRESS NOTES
Yun Simmons 68 y.o. female MRN: [id number]    Encounter: [id number]      Assessment & Plan     Assessment:  This is a 68 y.o.-year-old female with type 2 diabetes with hypertension and hyperlipidemia, and thyroid nodule.    Plan:  1. Type 2 diabetes: Most recent hemoglobin A1c was 7.2.  Since last being seen in our office her A1c has been slowly trending up.  Unfortunately cannot utilize SGLT2 inhibitors.  Recently started on Mounjaro 2.5 mg weekly.  Is having some side effects, but can tolerated at this time and will continue utilizing this medication.  She will continue metformin 1000 mg twice a day.  She will continue checking blood sugars on a daily basis and sending in glucose logs in the office.  We did discuss possible alternatives to Mounjaro if there is concerns for continued side effects such as DPP 4 or sulfonylurea to help glucose levels.  Ultimately continuing with lifestyle modifications to help improve glucose levels will also help.  Just because it has been a while since she has been seen I would like her to follow-up again in 3 months with lab work completed prior to visit.  Ultimately will likely extend out her office visits once again as she is typically well-controlled.     2. Thyroid nodule: Most recent thyroid ultrasound was completed in May 2024 and revealed stable thyroid nodule.  Biopsy in the past and was benign.  Was previously following up at [place] with surgical oncology, but provider she was seeing has recently left the practice and just wants to have it monitored here at this point.  Will continue with yearly ultrasounds at this time.     3. Hypertension:  Normotensive in the office today.  Kidney function remains stable.  Electrolytes normal.  Continue with current medications.  Repeat CMP prior to next office visit.     4. Hyperlipidemia:  Triglycerides slightly elevated.  Continue with current medications.  Will continue monitor at this time.     5. History of mild  hypercalcemia:  Recent lab work shows corrected calcium within normal range.  PTH and vitamin-D were normal.  Will continue to monitor at this time.    CC: Type 2 diabetes follow-up    History of Present Illness     HPI:  Yun Simmons is a 68 y.o. female with history of type 2 diabetes for about 8 years and thyroid nodules.  Who presents for a follow-up appointment.  Has not been seen in our office since August 2021.  Care was being managed by her PCP.  She is maintained on oral agents at home including metformin 1000 mg twice a day, and mounjaro 2.5 mg weekly.  Was prescribed Farxiga in the past but had side effects with medication.  Was then recommended to try Jardiance, but nephrologist did not want her utilizing that medication.  Is having some side effects with the Mounjaro at this time but nothing largely concerning.  She denies any polyuria, polydipsia, nocturia, blurry vision.  She denies history of peripheral neuropathy, nephropathy, retinopathy.  At this time is doing well and would feel more comfortable following up with endocrinology to help manage her glucose levels and prevent it from increasing further.     Hypoglycemic episodes: No.      The patient's last eye exam was in May 2024.   Last diabetic foot exam was completed July 2024.     Blood Sugar/Glucometer/Pump/CGM review: Blood sugar logs present at today's office visit.     For hyperlipidemia, she is maintained on atorvastatin 40 mg daily without myalgias.  For hypertension she continues on losartan 50 mg daily.  Denies any headaches, vision changes, chest pain, MI or strokelike symptoms.    He was found to have a thyroid nodule on CTA in August 2021.  3 nodules were noted largest measuring 3.4 cm in size.  Which met biopsy criteria.  Biopsy came back benign, but she has been following up over the past 3 years with surgical oncology at [place].  Provider she was seen recently left the practice, and wants to come back here to monitor.  There has  been no increase in size of the nodule.  Thyroid lab work has come back normal.  Denies any hyperthyroid or hypothyroid symptoms.  Denies any neck compressive symptoms.         Review of Systems   Constitutional:  Positive for fatigue. Negative for activity change, appetite change, diaphoresis and unexpected weight change.   HENT:  Negative for sore throat, trouble swallowing and voice change.    Eyes:  Negative for visual disturbance.   Respiratory:  Negative for chest tightness and shortness of breath.    Cardiovascular:  Negative for chest pain, palpitations and leg swelling.   Gastrointestinal:  Negative for abdominal pain, constipation, diarrhea, nausea and vomiting.   Endocrine: Negative for cold intolerance, heat intolerance, polydipsia, polyphagia and polyuria.   Genitourinary:  Negative for frequency.   Musculoskeletal:  Positive for arthralgias and back pain.   Skin:  Negative for rash and wound.   Neurological:  Positive for headaches. Negative for dizziness, tremors, weakness, light-headedness and numbness.   Hematological:  Negative for adenopathy.   Psychiatric/Behavioral:  Negative for dysphoric mood and sleep disturbance. The patient is not nervous/anxious.        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    • Diabetes mellitus (HCC)    • GERD (gastroesophageal reflux disease)    • Renal disorder    • Sleep apnea      Past Surgical History:   Procedure Laterality Date   •  SECTION     • COLONOSCOPY     • RENAL CYST EXCISION     • SHOULDER SURGERY      plastic hardware   • TONSILLECTOMY     • US GUIDED THYROID BIOPSY  2021     Social History   Social History     Substance and Sexual Activity   Alcohol Use Never     Social History     Substance and Sexual Activity   Drug Use Not Currently     Social History     Tobacco Use   Smoking Status Former   • Current packs/day: 0.00   • Average packs/day:  0.5 packs/day for 8.8 years (4.4 ttl pk-yrs)   • Types: Cigarettes   • Start date:    • Quit date: 10/29/1981   • Years since quittin.7   Smokeless Tobacco Never     Family History:   Family History   Problem Relation Age of Onset   • COPD Mother    • Lung cancer Mother    • Lung cancer Father    • Hypertension Father    • No Known Problems Sister    • Hypertension Brother    • Colon polyps Brother    • Kidney disease Brother    • Colon polyps Brother    • Stroke Brother    • Kidney disease Brother    • Hypertension Brother    • Hyperlipidemia Brother    • Gestational diabetes Sister    • Colon cancer Maternal Aunt        Meds/Allergies   Current Outpatient Medications   Medication Sig Dispense Refill   • acetaZOLAMIDE (DIAMOX) 250 mg tablet Take 1 tab twice a day (Patient taking differently: 125 in the morning and 250 at night.) 180 tablet 3   • Cholecalciferol (Vitamin D3) 50 MCG (2000 UT) capsule Take 2,000 Units by mouth every other day     • clopidogrel (PLAVIX) 75 mg tablet Take 75 mg by mouth daily     • famotidine (PEPCID) 40 MG tablet Take 40 mg by mouth Daily at 2am     • fluticasone (FLONASE) 50 mcg/act nasal spray 1 spray into each nostril daily     • halobetasol (ULTRAVATE) 0.05 % ointment Apply topically if needed     • losartan (COZAAR) 50 mg tablet TAKE 1 TABLET BY MOUTH EVERY DAY 90 tablet 1   • metFORMIN (GLUCOPHAGE) 1000 MG tablet TAKE 1 TABLET BY MOUTH TWICE A DAY WITH MEALS 180 tablet 3   • Mounjaro 2.5 MG/0.5ML Inject 2.5 mg under the skin every 7 days     • Multiple Vitamins-Minerals (PreserVision AREDS 2+Multi Vit) CAPS Take 1 tablet by mouth Daily at 2am     • ondansetron (ZOFRAN-ODT) 8 mg disintegrating tablet Take 8 mg by mouth every 8 (eight) hours as needed     • pantoprazole (PROTONIX) 40 mg tablet TAKE 1 TABLET BY MOUTH EVERY DAY 90 tablet 5   • Potassium Citrate ER 15 MEQ (1620 MG) TBCR Take 15 mEq by mouth 2 (two) times a day     • simvastatin (ZOCOR) 40 mg tablet Take 40 mg  "by mouth every evening     • ciclopirox (PENLAC) 8 % solution Apply 1 Application topically daily at bedtime (Patient not taking: Reported on 3/18/2024)     • ferrous sulfate 325 (65 FE) MG EC tablet Take 325 mg by mouth Two times a week (Patient not taking: Reported on 3/18/2024)     • gabapentin (NEURONTIN) 100 mg capsule Take 100 mg by mouth daily (Patient not taking: Reported on 3/18/2024)     • Multiple Vitamins-Minerals (PRESERVISION AREDS PO) Take by mouth 2 (two) times a day (Patient not taking: Reported on 3/18/2024)     • zoledronic acid (Reclast) 5 mg/100 mL IV infusion (premix) as directed Intravenous (Patient not taking: Reported on 3/18/2024)       No current facility-administered medications for this visit.     Allergies   Allergen Reactions   • Demerol [Meperidine] GI Intolerance     Other reaction(s): N/V   • Milk-Related Compounds - Food Allergy Sneezing   • Morphine GI Intolerance     Other reaction(s): N/V   • Farxiga [Dapagliflozin] Other (See Comments)     Tongue burning   • Meloxicam Swelling       Objective   Vitals: Blood pressure 110/70, pulse 69, height 5' 5\" (1.651 m), weight 88.9 kg (196 lb).    Physical Exam  Vitals and nursing note reviewed.   Constitutional:       General: She is not in acute distress.     Appearance: Normal appearance. She is not diaphoretic.   HENT:      Head: Normocephalic and atraumatic.   Eyes:      General: No scleral icterus.     Extraocular Movements: Extraocular movements intact.      Conjunctiva/sclera: Conjunctivae normal.      Pupils: Pupils are equal, round, and reactive to light.   Neck:      Thyroid: Thyroid mass present. No thyromegaly or thyroid tenderness.   Cardiovascular:      Rate and Rhythm: Normal rate and regular rhythm.      Heart sounds: No murmur heard.  Pulmonary:      Effort: Pulmonary effort is normal. No respiratory distress.      Breath sounds: Normal breath sounds. No wheezing.   Musculoskeletal:      Cervical back: Normal range of " "motion.      Right lower leg: No edema.      Left lower leg: No edema.   Lymphadenopathy:      Cervical: No cervical adenopathy.   Neurological:      Mental Status: She is alert and oriented to person, place, and time.      Sensory: No sensory deficit.   Psychiatric:         Mood and Affect: Mood normal.         Behavior: Behavior normal.         Thought Content: Thought content normal.         The history was obtained from the review of the chart, patient.    Lab Results:   Lab Results   Component Value Date/Time    BUN 14 11/02/2023 09:09 AM    Potassium 4.5 11/02/2023 09:09 AM    Chloride 107 (H) 11/02/2023 09:09 AM    CO2 21 11/02/2023 09:09 AM    Creatinine 0.77 11/02/2023 09:09 AM    AST 20 11/02/2023 09:09 AM    ALT 23 11/02/2023 09:09 AM    Protein, Total 7.0 11/02/2023 09:09 AM    Albumin 4.5 11/02/2023 09:09 AM    Globulin, Total 2.5 11/02/2023 09:09 AM         Portions of the record may have been created with voice recognition software. Occasional wrong word or \"sound a like\" substitutions may have occurred due to the inherent limitations of voice recognition software. Read the chart carefully and recognize, using context, where substitutions have occurred.    "

## 2024-07-29 ENCOUNTER — TELEPHONE (OUTPATIENT)
Dept: ENDOCRINOLOGY | Facility: HOSPITAL | Age: 69
End: 2024-07-29

## 2024-07-29 NOTE — TELEPHONE ENCOUNTER
Reviewed recent blood sugar logs.  More recently it looks like blood sugars have been improving which may be the result of the Mounjaro.  No adjustments to medications at this time.  Continue checking blood sugars daily.  Contact us in the meantime if there is any concerns.  Would like to see blood sugars again in about 2 weeks.

## 2024-07-30 ENCOUNTER — OFFICE VISIT (OUTPATIENT)
Dept: NEUROLOGY | Facility: CLINIC | Age: 69
End: 2024-07-30
Payer: COMMERCIAL

## 2024-07-30 VITALS
DIASTOLIC BLOOD PRESSURE: 54 MMHG | SYSTOLIC BLOOD PRESSURE: 113 MMHG | TEMPERATURE: 97.7 F | BODY MASS INDEX: 32.72 KG/M2 | HEART RATE: 67 BPM | OXYGEN SATURATION: 98 % | WEIGHT: 196.6 LBS

## 2024-07-30 DIAGNOSIS — G43.009 MIGRAINE WITHOUT AURA AND WITHOUT STATUS MIGRAINOSUS, NOT INTRACTABLE: Primary | ICD-10-CM

## 2024-07-30 DIAGNOSIS — G47.33 OSA ON CPAP: ICD-10-CM

## 2024-07-30 PROCEDURE — G2211 COMPLEX E/M VISIT ADD ON: HCPCS | Performed by: PSYCHIATRY & NEUROLOGY

## 2024-07-30 PROCEDURE — 99215 OFFICE O/P EST HI 40 MIN: CPT | Performed by: PSYCHIATRY & NEUROLOGY

## 2024-07-30 RX ORDER — ACETAZOLAMIDE 125 MG/1
TABLET ORAL
Qty: 270 TABLET | Refills: 4 | Status: SHIPPED | OUTPATIENT
Start: 2024-07-30

## 2024-07-30 NOTE — PROGRESS NOTES
Review of Systems   Constitutional:  Positive for fatigue. Negative for appetite change and fever.   HENT: Negative.  Negative for hearing loss, tinnitus, trouble swallowing and voice change.    Eyes: Negative.  Negative for photophobia, pain and visual disturbance.   Respiratory: Negative.  Negative for shortness of breath.    Cardiovascular: Negative.  Negative for palpitations.   Gastrointestinal:  Positive for nausea. Negative for vomiting.   Endocrine: Negative.  Negative for cold intolerance.   Genitourinary: Negative.  Negative for dysuria, frequency and urgency.   Musculoskeletal:  Negative for back pain, gait problem, myalgias, neck pain and neck stiffness.   Skin: Negative.  Negative for rash.   Allergic/Immunologic: Negative.    Neurological: Negative.  Negative for dizziness, tremors, seizures, syncope, facial asymmetry, speech difficulty, weakness, light-headedness, numbness and headaches.   Hematological: Negative.  Does not bruise/bleed easily.   Psychiatric/Behavioral: Negative.  Negative for confusion, hallucinations and sleep disturbance.    All other systems reviewed and are negative.

## 2024-07-30 NOTE — PATIENT INSTRUCTIONS
"Continue to follow regularly with eye doctor    Glad that your nephrologist has ruled out primary hyperparathyroidism, But that they continue to follow the thyroid nodule yearly since it is a large.    Discuss with nephrology whether they would be ok with 1-5 days of dexamethasone/Decadron 2 to 4 mg -  if needed to break this in the future.   -Glad to hear that you have tolerated the low-dose of prednisone your PCP has prescribed I just wanted to be extra cautious and not go against any nephrology recommendations      Headache/migraine treatment:   Rescue medications (for immediate treatment of a headache):   It is ok to take acetaminophen  if they help your headaches you should limit these to No more than 3 times a week to avoid medication overuse/rebound headaches.     \"exedrin tension\" - caffeine/tylenol       Prescription preventive medications for headaches/migraines   (to take every day to help prevent headaches - not to take at the time of headache):  [x]     -     acetaZOLAMIDE (DIAMOX) 125 mg in am and in pm      If ever needed you could absolutely add 125 mg or 250 mg twice during the day time between these doses if you feel symptoms are ever worse midday.     Certainly if you happen to awaken at night you absolutely could take 125 mg or 250 mg overnight as well    If you were to increase these medications and if needed there is a longer acting form at 500 mg twice a day.      - if a lower dose is helpful, can stay lower, if higher dose causes side effects, go back to last tolerated dose.  If you get all the way up to the dose recommended and is not helping enough let me know as I will absolutely go higher.    - make sure to stay hydrated while on this as can cause dehydration since that is it's purpose to take fluid off.   - most common side effect is tingling of the nerves at times  - can cause electrolyte disturbances, but not typically in any significant way. However, be cautious if taking with other " meds that lower potassium like hydrochlorothiazide etc    *Typically these types of medications take time until you see the benefit, although some may see improvement in days, often it may take weeks, especially if the medication is being titrated up to a beneficial level. Please contact us if there are any concerns or questions regarding the medication.     Lifestyle Recommendations:  [x] SLEEP - Maintain a regular sleep schedule: Adults need at least 7-8 hours of uninterrupted a night. Maintain good sleep hygiene:  Going to bed and waking up at consistent times, avoiding excessive daytime naps, avoiding caffeinated beverages in the evening, avoid excessive stimulation in the evening and generally using bed primarily for sleeping.  One hour before bedtime would recommend turning lights down lower, decreasing your activity (may read quietly, listen to music at a low volume). When you get into bed, should eliminate all technology (no texting, emailing, playing with your phone, iPad or tablet in bed).  [x] HYDRATION - Maintain good hydration.  Drink  2L of fluid a day (4 typical small water bottles)  [x] DIET - Maintain good nutrition. In particular don't skip meals and try and eat healthy balanced meals regularly.  [x] TRIGGERS - Look for other triggers and avoid them: Limit caffeine to 1-2 cups a day or less. Avoid dietary triggers that you have noticed bring on your headaches (this could include aged cheese, peanuts, MSG, aspartame and nitrates).  [x] EXERCISE - physical exercise as we all know is good for you in many ways, and not only is good for your heart, but also is beneficial for your mental health, cognitive health and  chronic pain/headaches. I would encourage at the least 5 days of physical exercise weekly for at least 30 minutes.     Education and Follow-up  [x] Please call with any questions or concerns. Of course if any new concerning symptoms go to the emergency department.  [x] Follow up 3-6 months  and if everything is exactly the same certainly you could push it back to 6-months, sooner if needed

## 2024-07-30 NOTE — PROGRESS NOTES
[facility]rology Conc[facility]ache [facility] Consult - Follow up   PATIENT:  Yun Simmons  MRN:  [id number]  :  1955  DATE OF SERVICE:  2024  REFERRED BY: No ref. provider found  PMD: Jose Fernandez MD    Assessment/Plan:   Yun Simmons is a delightful 68 y.o. female with a past medical history that includes hypercalcemia, type 2 diabetes mellitus, thyroid versus parathyroid nodule, DESTINEY on CPAP (), hypertension, postural hypotension, normocytic anemia, hyperlipidemia,  headaches, BMI over 30, visual disturbance, autosomal dominant polycystic kidney disease*, kidney stone, s/p shoulder surgery referred here for evaluation of headache.  My initial evaluation 2023     Migraine without aura and without status migrainosus, not intractable  Visual disturbance-follows with 2 eye specialists  Features of idiopathic intracranial hypertension on imaging and history without papilledema not meeting criteria for IIH  DESTINEY on CPAP (REM 47)   She reports a long history of headaches and migraines dating back to her teens and early 20s that seemed to significantly improve following diagnosis and treatment of DESTINEY in the .  Initially, she felt that was the last time she had headaches, but in recollection she recalls she did follow with a neurologist in [place] in the early  and did round round of Botox for chronic migraine.  She believes migraines may have improved following menopause.  On 3/22/2023, she woke up with a headache and was playing cards when she noticed unilateral peripheral vision loss on the left, unclear if she woke up with that as well, but it persisted and she went to the emergency department where she was admitted for stroke evaluation and found not to have had a stroke.  MRI brain was unremarkable for acute pathology, as was CTA head and neck, carotid ultrasound, TTE, and she was already on aspirin which was continued as well as risk factor control.  She followed  up with eye doctor last month and no pertinent issues were found except for early macular degeneration.  She reports the pain was right temporal and therefore ESR and CRP were also obtained which were only slightly elevated and temporal ultrasound was unremarkable as well.  -As of 5/2/2023: She reports no further migraines, no further vision changes.  We discussed most likely etiology could be either suboptimally treated DESTINEY contributing to exacerbation of migraine or related to the hypercalcemia.  She mentions some mild memory issues which are not atypical for age and MOCA today was normal.  - as of 6/29/2023: She returns sooner than expected after an incident on 6/6/2023 as detailed in HPI where she had left hand paresthesias that radiate up to the left neck and then down the left side of the body lasting approximately 20 minutes not associated with headache.  She was admitted to the hospital for MRI brain and MRA head were unremarkable for acute pathology and we discussed for my review she does appear to have subtle signs of idiopathic intracranial hypertension and I suggest trial of acetazolamide/Diamox to see if this is the cause of the visual issues she has been following with 2 eye doctors for despite no papilledema.  She is using her CPAP and AHI is less than 1 without leak.  Following up with nephrology for hypercalcemia.  We discussed that I am not convinced the episode was a TIA, but since the inpatient neurologist recommended transition to Plavix, I would not disagree with their recommendations out of precaution.    - as of 11/7/2023: She reports doing very well on acetazolamide/Diamox 250 mg twice daily with only 1 headache, if that, in a month that is not that severe and more of a pressure sensation that acetaminophen helps.  She continues to use her CPAP anytime she is sleeping and we will try and find the old sleep records as she wonders what her AHI was.  She is following closely with ophthalmology  every 6 months to different specialists and no papilledema noted.  No return of the symptoms that admitted her in June.    - as of 3/18/2024: She reports headaches have resolved on acetazolamide 125 mg twice daily which she decreased from 250 mg twice daily around November per PCP recommendations due to chloride being elevated by 1 number at 107 and we discussed certainly if needed could increase the medication again although would recommend every 4 hours rather than just twice a day as I suspect she may have wearing off midday.  Recommended resuming PreserVision through eye doctor for macular degeneration which she recently discontinued and she is having more floaters, follows closely with two eye doctors.  She also stopped iron recently which we discussed could have a negative impact on sleep.  Otherwise she is doing excellent using her CPAP every night all night.  - as of 7/30/2024: She reports no headaches or migraines on acetazolamide 125 mg in a.m. and 250 mg in p.m. with no bothersome side effects.  Recent labs reviewed and follow-up CMP scheduled through other providers although such a tiny dose unlikely to have findings that would make as have to discontinue.  Following with nephrology closely and discussed her overall health as well today.  She is treating sleep apnea with CPAP all night which tends to be around 6 hours and wakes around 4 AM and we discussed ideally if she could fall back asleep at 4 AM it could help with her fatigue that started after she began Mounjaro injections 3 weeks ago.     Workup:  - MOCA normal 27/30 (-2 recall, -1 repeat sentence)   -MRI brain pituitary with and without contrast 3/22/2023: 1.  Unremarkable exam of the pituitary.  No evidence of lesion.  2.  Mild microangiopathic change.  No evidence of acute infarct, intracranial hemorrhage or mass.  -Carotid ultrasound 3/22/2023: There is no evidence of giant cell arteritis, aneurysm, or significant stenosis  noted in the  superficial temporal artery and its branches  -CTA head and neck with and without contrast 3/22/2023: No significant stenosis of the cervical carotid or vertebral arteries.  No significant intracranial stenosis, large vessel occlusion or aneurysm.  - MRI brain without contrast 6/7/2023: 1. No acute infarction.   2. Mild chronic microvascular changes. Remote white matter change just   lateral to the atrium of left lateral ventricle.   3. Tiny susceptible focus, left temporal lobe, as described (It likely reflects a remote petechial hemorrhage or  small vascular calcification). Neither remote   petechial hemorrhage or small vascular calcification.   4. No mass effect or midline shift.   *Above per radiology, as of my retrospective review 11/7/2023 we discussed she has a partially empty sella but nearly completely empty, right greater than left optic nerve sheath prominence with slight tortuosity, ventricles are not extremely tight although if so a little bit on the right, cerebellar tonsils overlie the foramen magnum without Chiari   - MRA brain 6/7/2023: Evaluation of the petrous, cavernous and supraclinoid portions of the distal internal carotid arteries as well as the anterior cerebral and middle   cerebral arteries is unremarkable without evidence of aneurysm, stenosis or   major vessel occlusion.     -MRA neck and carotid ultrasound 6/7/2023: negative    Preventative:  - we discussed headache hygiene and lifestyle factors that may improve headaches  -   acetaZOLAMIDE (DIAMOX) 125 mg/250 mg. With plenty of room to adjust up or down as needed/tolerated but will continue here for now at this tiny dose, as she is doing well.  Discussed proper use, possible side effects and risks.  - Currently on through other providers: Losartan/Cozaar 50 mg daily, vitamin D 2000 units daily,  switched to plavix in June 2023, potassium through nephology 15 mEq BID prior (does not like the large pill though), PreserVision for her  "eyes, currently you not taking iron prescribed by PCP but will if PCP recommends taking it again, I would agree since impacts sleep as well   - Past/ failed/contraindicated: Vasotec, gabapentin 100 mg   - future options: Topiramate although with caution due to history of kidney stones, verapamil, CGRP med, botox    Rescue:  - recommend not taking over-the-counter or prescription pain medications more than 3 days per week to prevent medication overuse/rebound headache  - Prescription abortive not indicated at this time, we discussed most likely thing to help for prolonged episode in the future would be steroids if nephrology would be agreeable for short course - she will ask, but PCP has prescribed   - Currently on through other providers: OTC meds  - Past/ failed/contraindicated: Fioricet  -Past/tolerated: PCP gave her short dose of steroids for her current back pain, heel issue, TMJ  - future options:  Triptan, prochlorperazine, Toradol IM or p.o., could consider trial of 5 days of Depakote 500 mg nightly or dexamethasone 2 mg daily for prolonged migraine, ubrelvy, reyvow, nurtec      Patient instructions        Continue to follow regularly with eye doctor    Glad that your nephrologist has ruled out primary hyperparathyroidism, But that they continue to follow the thyroid nodule yearly since it is a large.    Discuss with nephrology whether they would be ok with 1-5 days of dexamethasone/Decadron 2 to 4 mg -  if needed to break this in the future.   -Glad to hear that you have tolerated the low-dose of prednisone your PCP has prescribed I just wanted to be extra cautious and not go against any nephrology recommendations      Headache/migraine treatment:   Rescue medications (for immediate treatment of a headache):   It is ok to take acetaminophen  if they help your headaches you should limit these to No more than 3 times a week to avoid medication overuse/rebound headaches.     \"exedrin tension\" - caffeine/tylenol "       Prescription preventive medications for headaches/migraines   (to take every day to help prevent headaches - not to take at the time of headache):  [x]     -     acetaZOLAMIDE (DIAMOX) 125 mg in am and in pm      If ever needed you could absolutely add 125 mg or 250 mg twice during the day time between these doses if you feel symptoms are ever worse midday.     Certainly if you happen to awaken at night you absolutely could take 125 mg or 250 mg overnight as well    If you were to increase these medications and if needed there is a longer acting form at 500 mg twice a day.      - if a lower dose is helpful, can stay lower, if higher dose causes side effects, go back to last tolerated dose.  If you get all the way up to the dose recommended and is not helping enough let me know as I will absolutely go higher.    - make sure to stay hydrated while on this as can cause dehydration since that is it's purpose to take fluid off.   - most common side effect is tingling of the nerves at times  - can cause electrolyte disturbances, but not typically in any significant way. However, be cautious if taking with other meds that lower potassium like hydrochlorothiazide etc    *Typically these types of medications take time until you see the benefit, although some may see improvement in days, often it may take weeks, especially if the medication is being titrated up to a beneficial level. Please contact us if there are any concerns or questions regarding the medication.     Lifestyle Recommendations:  [x] SLEEP - Maintain a regular sleep schedule: Adults need at least 7-8 hours of uninterrupted a night. Maintain good sleep hygiene:  Going to bed and waking up at consistent times, avoiding excessive daytime naps, avoiding caffeinated beverages in the evening, avoid excessive stimulation in the evening and generally using bed primarily for sleeping.  One hour before bedtime would recommend turning lights down lower, decreasing  your activity (may read quietly, listen to music at a low volume). When you get into bed, should eliminate all technology (no texting, emailing, playing with your phone, iPad or tablet in bed).  [x] HYDRATION - Maintain good hydration.  Drink  2L of fluid a day (4 typical small water bottles)  [x] DIET - Maintain good nutrition. In particular don't skip meals and try and eat healthy balanced meals regularly.  [x] TRIGGERS - Look for other triggers and avoid them: Limit caffeine to 1-2 cups a day or less. Avoid dietary triggers that you have noticed bring on your headaches (this could include aged cheese, peanuts, MSG, aspartame and nitrates).  [x] EXERCISE - physical exercise as we all know is good for you in many ways, and not only is good for your heart, but also is beneficial for your mental health, cognitive health and  chronic pain/headaches. I would encourage at the least 5 days of physical exercise weekly for at least 30 minutes.     Education and Follow-up  [x] Please call with any questions or concerns. Of course if any new concerning symptoms go to the emergency department.  [x] Follow up 3-6 months and if everything is exactly the same certainly you could push it back to 6-months, sooner if needed       CC:   We had the pleasure of evaluating Yun Simmons in neurological consultation today. Yun Simmons is a   right handed female who presents today for evaluation of headaches.     History obtained from patient as well as available medical record review.  History of Present Illness:   Interval history as of 7/30/2024  - no significant new or concerning neurologic symptoms since last visit   - 5/21/24 followed up with her retinal specialist who sent OCT report with pictures of RNFL and she was told everything looks good, no concerns, report uploaded today as well as another report uploaded from the same day on 6/3/2024  --No papilledema/never has had, barely has any drusen in either eye and they feel it  is just drusen currently and not true macular degeneration, asteroid hyalosis OS stable   - DESTINEY on CPAP (REM 47) bed at 10 and up at 4 and hard to get back to sleep - at least 6-7 hours with it   -Since last visit has been following with nephrology - doing another 24 hour urine collection and wanted her to see endo for the monjaro (she had a bad reaction to jardiance) , endocrinology and follow-up of thyroid nodule - stable and large and if any larger they will do something   -Endocrinology ordered CMP and A1c for 3 months from now which can help for monitoring of electrolytes  - watching renal stone for years     Headaches and migraines   She reports she continues to do well on acetazolamide  No migraines or headaches  Some fatigue and nausea from Mounjaro - 3 weeks now, nausea after and for 6 days decreased appetite, GERD is a little worse too, her PCP said add famotidine and protonix     Preventative:   -  acetazolamide/Diamox 125 mg twice daily (anywhere 8-10AM) and 250 mg about 6-7 pm at night    -she has not noticed any wearing off effect midday - tingling occasionally and not bothersome  Decreased per PCP from 250 mg twice a day around November when the chloride came back slightly elevated 107 (although that is wnl in our lab we discussed), seeing nephrology soon  Denies bothersome side effects       Interval history as of 3/18/2024  - no significant new or concerning neurologic symptoms since last visit   - ENT for post nasal drip and cough and everything clear - famotidine/pepcid and didn't help   - stopped zinc pills for eyes, for mac degeneration - sees more floaters now - a couple of weeks ago - when up in the morning and when going to be (- Ophthalmology 10/6/2023 no papilledema, drusen - now sees DM and retina specialist ever since the episode due to peripheral vision - seeing one or the other every 6 months)  - 6 hours now if doesn't wake up in the middle of the night at 4/5,  CPAP all night every  "night, naps during the day without machine   - back pain the past 3-4 weeks, started one day for no good reason - if doesn't breathe feels good, but if dont breathe doesn't feel it, given gabapentin and didn't help, PT coming up-we discussed back pain would not be my area of expertise and I am glad she is seeing other providers for it could always consider orthopedics, pain management, physiatry for further evaluation if needed.    Headaches and migraines   She reports no headaches at all really but when pressed reports maybe they occur rarely  For sure less than 1 a month- \"basically none\"      Preventative:   -  acetazolamide/Diamox 125 mg twice daily (anywhere 8-10) and about 6-7 pm at night   -she has not noticed any wearing off effect midday - tingling occasionally and not bothersome  Decreased from 250 mg twice a day around November when the chloride came back slightly elevated 107, per PCP,   - was taking iron every other day for a while but now as didn't refill she also discontinued her PreserVision recently  Denies bothersome side effects        Interval history as of 11/7/2023  - no significant new or concerning neurologic symptoms since last visit   -5/9/2023 she followed up with pulm for DESTINEY - \"she is wearing her CPAP all night every night.  When she wears it her AHI is less than 1 and she has no leak\"  - Ophthalmology 10/6/2023 no papilledema, drusen - now sees DM and retina specialist ever since the episode due to peripheral vision - seeing one or the other every 6 months   - cut back on coffee too  - works hard cleaning houses to stay active   -She reports she feels done more for her to help her than some the other doctors which is very sweet    Headaches and migraines   She reports doing very well on acetazolamide/Diamox with only 1 headache a month that is not that severe and more of a pressure sensation that acetaminophen helps. If that     Preventative:   Trial of acetazolamide/Diamox 250 mg twice " "daily- 630-930 am and 6-8 pm -she has not noticed any wearing off effect midday - tingling occasionally and not bothersome    - Currently on through other providers: Losartan/Cozaar 50 mg daily, vitamin D 2000 units daily, aspirin 81 mg daily switched to plavix in June 2023, potassium started by nephology     Abortive:   - acetaminophen helps  Denies bothersome side effects      Interval history as of 6/29/2023  -5/9/2023 she followed up with pulm for DESTINEY - \"she is wearing her CPAP all night every night.  When she wears it her AHI is less than 1 and she has no leak\"  -5/19/2023 followed up with PCP - changed to [facility]   -6/1/2023 followed up with nephrology - 24 hour urine study   -6/15/2023 followed up with PCP -    Regular eye doctor and then retina specialist soon to follow up - Oct 6th     Vacation home up there in the [facility] if why they were up there    On 6/6/2023, she was awake already around 7:30 am and folding clothes and felt numb in the whole hand and ran some water under the hand and was massaging it and then it went up to the left neck and then the left breast and down the side, and woke up  and then drove in   lasted approximately 20 minutes and then resolved.  30 min drive    They consulted with a neurologist via telemedicine and was transferred to another facility to get MRI brain and MRA head.    Per discharge summary, she was eval by neurology and it was thought to be a TIA and she was started on dual antiplatelet therapy with aspirin Plavix for 3 weeks and they recommended continuing Plavix thereafter since they believe she failed aspirin therapy.      Headaches and migraines   None, first started March peripheral vision and now having floaters over the past week, first mentioned yesterday      History as of initial visit 5/2/2023:  Admitted 3/22/2023 to 3/23/23  -She presented to the emergency department with headache and was playing cards and noticed peripheral vision loss on the left " side was gone (unsure when started and lasted into the night) with a right temporal with the left peripheral vision loss. Woke up with it, tried tylenol without relief, went to /76, NIHSS 1, MRI Brain, CTA h/N, carotid US, mildly elevated CRP, ESR and temporal US unremarakable, A1c 6.5, TTE ok 65%, migraine resolved the next am. Migraine cocktail did not help in hospital    -MRI brain pituitary with and without contrast 3/22/2023: 1.  Unremarkable exam of the pituitary.  No evidence of lesion.  2.  Mild microangiopathic change.  No evidence of acute infarct, intracranial hemorrhage or mass.  -Carotid ultrasound 3/22/2023: There is no evidence of giant cell arteritis, aneurysm, or significant stenosis  noted in the superficial temporal artery and its branches.  -CTA head and neck with and without contrast 3/22/2023: No significant stenosis of the cervical carotid or vertebral arteries. No significant intracranial stenosis, large vessel occlusion or aneurysm.    -Further episodes since hospitalization and blood pressure runs 130s over 60s to 70s    Memory  Occasionally repeating things that were already said  Forgetfulness with phone, keys, wallet  No family history of dementia   No driving safety issues       Headaches started at what age? Teens, early 20s   How often do the headaches occur?   -Has a history of headaches in the past that seemed to resolve since using CPAP 1990s  - not had headaches in a long time since likely 1990s she had thought but on second thought did see someone in [place] early 2000s and did one round of botox  - as of 5/2/2023: no further episodes     Aura? Without      Last eye exam: in April 2023 saw retina specialist and no issues   Early mac degeneration     Where is your headache located and pain quality?   - right temple dull pain constant     What is the intensity of pain? Average: 8/10, worst likely 10 a long time ago/10  Associated symptoms:   [] Nausea       [] Vomiting          [] Photophobia     []Phonophobia      [] Osmophobia  [] Blurred vision - left side unsure if just left eye or both  [] Light-headed or dizzy   - infrequent, mild   [] Tinnitus   [] Hands or feet tingle or feel numb/paresthesias    [] Ptosis      [] Facial droop  [] Lacrimation  [] Nasal congestion/rhinorrhea        Have you seen someone else for headaches or pain? Yes, PCP, neurology   Have you had trigger point injection performed and how often? No  Have you had Botox injection performed and how often? Yes - once on the forehead  Have you had epidural injections or transforaminal injections performed? no  Are you current pregnant or planning on getting pregnant? Menopause unknown due to partial hysterectomy   Have you ever had any Brain imaging? yes    What medications do you take or have you taken for your headaches?   ABORTIVE/pertinent p.r.n. Meds:      Not much     Past   fioricet    PREVENTIVE/pertinent daily meds:       Losartan/Cozaar 50 mg - years   Vitamin D 2000 units daily  Aspirin 81 mg daily    Past/ failed/contraindicated:  vasotec       LIFESTYLE  Sleep   - averages: 7-8 hours and naps       The following portions of the patient's history were reviewed and updated as appropriate: allergies, current medications, past family history, past medical history, past social history, past surgical history and problem list.    Pertinent family history:  Family history of headaches:  migraine headaches in mother, sister     Pertinent social history:  Work: retired  Education: college, [employer]  Lives with       Pertinent lab results:   See EMR for recent labs  -3/22/2023 CBC significant for hemoglobin 10.4 MCV 92  BMP unremarkable  LDL 52  ESR 41  CRP 11.7  A1c 6.5  10/27/2022 CMP remarkable for glucose 126, calcium 10.6  9/22/2021 vitamin D  TSH normal  PTH 32 with calcium of 10.4     Imaging: I have personally reviewed imaging and radiology read      - MRI brain without contrast 6/7/2023: 1. No acute  infarction.   2. Mild chronic microvascular changes. Remote white matter change just   lateral to the atrium of left lateral ventricle.   3. Tiny susceptible focus, left temporal lobe, as described (It likely reflects a remote petechial hemorrhage or  small vascular calcification). Neither remote   petechial hemorrhage or small vascular calcification.   4. No mass effect or midline shift.   *Above per radiology, as of my retrospective review 11/7/2023 we discussed she has a partially empty sella but nearly completely empty, right greater than left optic nerve sheath prominence with slight tortuosity, ventricles are not extremely tight although if so a little bit on the right, cerebellar tonsils overlie the foramen magnum without Chiari         - MRA brain 6/7/2023: Evaluation of the petrous, cavernous and supraclinoid portions of the    distal internal carotid arteries as well as the anterior cerebral and middle   cerebral arteries is unremarkable without evidence of aneurysm, stenosis or   major vessel occlusion.     -MRA neck 6/7/2023:  FINDINGS:   There is antegrade flow in the carotid and vertebral arteries.     Aortic Arch and Great Vessel Origins: Widely patent.   Subclavian Arteries: Widely patent.   Right Common Carotid Artery: Widely patent.   Right Internal Carotid Artery: Widely patent.   Right External Carotid Artery: Widely patent.   Left Common Carotid Artery: Widely patent.   Left Internal Carotid Artery: Widely patent.   Left External Carotid Artery: Widely patent.   Right Vertebral Artery: Widely patent.   Left Vertebral Artery: Widely patent.   Basilar Artery: Widely patent. The vertebrobasilar junction is tortuous.     - Carotid ultrasound 6/7/2023:  On the right side there is a mild amount of plaque seen. Peak systolic velocity   in the right internal carotid artery is  measured at 93 cm/sec. Flow in the   right vertebral artery is antegrade in direction. Internal carotid to common   carotid  artery velocity ratio measures 1.1.   On the left side there is a mild amount of plaque seen. Peak systolic velocity   in left internal carotid artery is measured at 85 cm/sec. Flow in the left   vertebral artery is antegrade in direction.  Internal carotid to common carotid   artery velocity ratio measures 1.2.     Of note evaluation of the left neck is a heterogeneous appearing left thyroidal   lobe with areas of lucency and some more solid-appearing areas. Further   evaluation with thyroid ultrasound is recommended.        -MRI brain pituitary with and without contrast 3/22/2023: 1.  Unremarkable exam of the pituitary.  No evidence of lesion.  2.  Mild microangiopathic change.  No evidence of acute infarct, intracranial hemorrhage or mass.     -Carotid ultrasound 3/22/2023: There is no evidence of giant cell arteritis, aneurysm, or significant stenosis  noted in the superficial temporal artery and its branches.     -CTA head and neck with and without contrast 3/22/2023: No significant stenosis of the cervical carotid or vertebral arteries.  No significant intracranial stenosis, large vessel occlusion or aneurysm.     -CTA head and neck with and without contrast for dizziness 8/3/2021: 1. No evidence of acute intracranial hemorrhage.  2. No evidence of hemodynamic significant stenosis, aneurysm or dissection.       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 in a.m. and 250 mg in evening 270 tablet 4    Cholecalciferol (Vitamin D3) 50 MCG (2000 UT) capsule Take 2,000 Units by mouth every other day      clopidogrel (PLAVIX) 75 mg tablet Take 75 mg by mouth daily      famotidine (PEPCID) 40 MG tablet Take 40 mg by mouth Daily at 2am      fluticasone (FLONASE) 50 mcg/act nasal spray 1 spray into each nostril daily      halobetasol (ULTRAVATE) 0.05 % ointment Apply topically if needed      losartan (COZAAR) 50 mg tablet TAKE 1 TABLET BY MOUTH EVERY DAY 90 tablet 1    metFORMIN (GLUCOPHAGE) 1000 MG tablet TAKE 1 TABLET BY MOUTH TWICE A DAY WITH MEALS 180 tablet 3    Mounjaro 2.5 MG/0.5ML Inject 2.5 mg under the skin every 7 days      Multiple Vitamins-Minerals (PreserVision AREDS 2+Multi Vit) CAPS Take 1 tablet by mouth Daily at 2am      ondansetron (ZOFRAN-ODT) 8 mg disintegrating tablet Take 8 mg by mouth every 8 (eight) hours as needed      pantoprazole (PROTONIX) 40 mg tablet TAKE 1 TABLET BY MOUTH EVERY DAY 90 tablet 5    Potassium Citrate ER 15 MEQ (1620 MG) TBCR Take 15 mEq by mouth 2 (two) times a day      simvastatin (ZOCOR) 40 mg tablet Take 40 mg by mouth every evening      ciclopirox (PENLAC) 8 % solution Apply 1 Application topically daily at bedtime (Patient not taking: Reported on 3/18/2024)      ferrous sulfate 325 (65 FE) MG EC tablet Take 325 mg by mouth Two times a week (Patient not taking: Reported on 3/18/2024)      gabapentin (NEURONTIN) 100 mg capsule Take 100 mg by mouth daily (Patient not taking: Reported on 3/18/2024)      Multiple Vitamins-Minerals (PRESERVISION AREDS PO) Take by mouth 2 (two) times a day (Patient not taking: Reported on 3/18/2024)      zoledronic acid (Reclast) 5 mg/100 mL IV infusion (premix) as directed Intravenous (Patient not taking: Reported on 3/18/2024)       No current facility-administered medications for this visit.        Allergies:      Allergies   Allergen Reactions     Demerol [Meperidine] GI Intolerance     Other reaction(s): N/V    Milk-Related Compounds - Food Allergy Sneezing    Morphine GI Intolerance     Other reaction(s): N/V    Farxiga [Dapagliflozin] Other (See Comments)     Tongue burning    Meloxicam Swelling       Family History:     Family History   Problem Relation Age of Onset    COPD Mother     Lung cancer Mother     Lung cancer Father     Hypertension Father     No Known Problems Sister     Hypertension Brother     Colon polyps Brother     Kidney disease Brother     Colon polyps Brother     Stroke Brother     Kidney disease Brother     Hypertension Brother     Hyperlipidemia Brother     Gestational diabetes Sister     Colon cancer Maternal Aunt        Social History:     Social History     Socioeconomic History    Marital status: /Civil Union     Spouse name: Not on file    Number of children: Not on file    Years of education: Not on file    Highest education level: Not on file   Occupational History    Not on file   Tobacco Use    Smoking status: Former     Current packs/day: 0.00     Average packs/day: 0.5 packs/day for 8.8 years (4.4 ttl pk-yrs)     Types: Cigarettes     Start date:      Quit date: 10/29/1981     Years since quittin.7    Smokeless tobacco: Never   Vaping Use    Vaping status: Never Used   Substance and Sexual Activity    Alcohol use: Never    Drug use: Not Currently    Sexual activity: Not on file   Other Topics Concern    Not on file   Social History Narrative    Not on file     Social Determinants of Health     Financial Resource Strain: Not on file   Food Insecurity: Not on file   Transportation Needs: Not on file   Physical Activity: Not on file   Stress: Not on file   Social Connections: Not on file   Intimate Partner Violence: Not on file   Housing Stability: Not on file         Objective:         Physical Exam:                                                                 Vitals:            Constitutional:    BP  113/54 (BP Location: Left arm, Patient Position: Sitting, Cuff Size: Adult)   Pulse 67   Temp 97.7 °F (36.5 °C) (Temporal)   Wt 89.2 kg (196 lb 9.6 oz)   SpO2 98%   BMI 32.72 kg/m²   BP Readings from Last 3 Encounters:   07/30/24 113/54   07/26/24 110/70   03/18/24 129/63     Pulse Readings from Last 3 Encounters:   07/30/24 67   07/26/24 69   03/18/24 67         Well developed, well nourished, well groomed.        Psychiatric:  Normal behavior and appropriate affect        Able to answer questions appropriately, provide history of recent events   Normal language and spontaneous speech.  facial expression symmetric  symmetric bulk throughout. no atrophy, fasciculations or significant abnormal movements noted during our visit from observation.   steady casual gait          Review of Systems:   ROS obtained by medical assistant and reviewed, but if any symptoms listed below say negative, does not mean patient has not had this symptom since last visit, please see HPI for details of symptoms discussed this visit.  Regarding any abnormal or positive findings in ROS that are not neurologic related, patient instructed to address these issues with PCP or go to the ER if they are severe.         Review of Systems   Constitutional:  Positive for fatigue. Negative for appetite change and fever.   HENT: Negative.  Negative for hearing loss, tinnitus, trouble swallowing and voice change.    Eyes: Negative.  Negative for photophobia, pain and visual disturbance.   Respiratory: Negative.  Negative for shortness of breath.    Cardiovascular: Negative.  Negative for palpitations.   Gastrointestinal:  Positive for nausea. Negative for vomiting.   Endocrine: Negative.  Negative for cold intolerance.   Genitourinary: Negative.  Negative for dysuria, frequency and urgency.   Musculoskeletal:  Negative for back pain, gait problem, myalgias, neck pain and neck stiffness.   Skin: Negative.  Negative for rash.   Allergic/Immunologic:  Negative.    Neurological: Negative.  Negative for dizziness, tremors, seizures, syncope, facial asymmetry, speech difficulty, weakness, light-headedness, numbness and headaches.   Hematological: Negative.  Does not bruise/bleed easily.   Psychiatric/Behavioral: Negative.  Negative for confusion, hallucinations and sleep disturbance.    All other systems reviewed and are negative.             I have spent 34 minutes with Patient  today in which greater than 50% of this time was spent in counseling/coordination of care regarding Prognosis, Risks and benefits of tx options, Instructions for management, Patient and family education, Importance of tx compliance, Risk factor reductions, Impressions, Counseling / Coordination of care, Documenting in the medical record, Reviewing / ordering tests, medicine, procedures  , Obtaining or reviewing history  , and Communicating with other healthcare professionals . I also spent 20 minutes non face to face for this patient the same day.       Author:  Tiffany Magallanes MD 7/30/2024 11:35 AM

## 2024-08-28 LAB
LEFT EYE DIABETIC RETINOPATHY: NORMAL
RIGHT EYE DIABETIC RETINOPATHY: NORMAL

## 2024-09-04 ENCOUNTER — TELEPHONE (OUTPATIENT)
Dept: ENDOCRINOLOGY | Facility: HOSPITAL | Age: 69
End: 2024-09-04

## 2024-09-04 NOTE — TELEPHONE ENCOUNTER
Reviewed recent blood sugar logs.  Average fasting blood sugar is 128 over the last 30 days.  Over the last 90 days was 147 indicating that fasting blood sugars has recently improved.  At this time I do not want to make any adjustments to her medications.  Checking blood sugars later in the day may also be helpful to see if she is spiking later in the day and if needed in the future may give us a better indication of what medications to use.

## 2024-10-24 ENCOUNTER — TELEPHONE (OUTPATIENT)
Age: 69
End: 2024-10-24

## 2024-10-24 NOTE — TELEPHONE ENCOUNTER
Patient called to ask us to fax the lab orders to [facility] on [place], [place]    Fax# is [phone].    I faxed but not sure they went through, Can you please fax for patient?

## 2024-10-26 LAB
ALBUMIN SERPL-MCNC: 4.1 G/DL (ref 3.9–4.9)
ALP SERPL-CCNC: 82 IU/L (ref 44–121)
ALT SERPL-CCNC: 15 IU/L (ref 0–32)
AST SERPL-CCNC: 12 IU/L (ref 0–40)
BILIRUB SERPL-MCNC: 0.4 MG/DL (ref 0–1.2)
BUN SERPL-MCNC: 15 MG/DL (ref 8–27)
BUN/CREAT SERPL: 22 (ref 12–28)
CALCIUM SERPL-MCNC: 9.8 MG/DL (ref 8.7–10.3)
CHLORIDE SERPL-SCNC: 109 MMOL/L (ref 96–106)
CO2 SERPL-SCNC: 19 MMOL/L (ref 20–29)
CREAT SERPL-MCNC: 0.69 MG/DL (ref 0.57–1)
EGFR: 93 ML/MIN/1.73
EST. AVERAGE GLUCOSE BLD GHB EST-MCNC: 137 MG/DL
GLOBULIN SER-MCNC: 2.3 G/DL (ref 1.5–4.5)
GLUCOSE SERPL-MCNC: 108 MG/DL (ref 70–99)
HBA1C MFR BLD: 6.4 % (ref 4.8–5.6)
POTASSIUM SERPL-SCNC: 4.3 MMOL/L (ref 3.5–5.2)
PROT SERPL-MCNC: 6.4 G/DL (ref 6–8.5)
SODIUM SERPL-SCNC: 141 MMOL/L (ref 134–144)

## 2024-11-08 ENCOUNTER — OFFICE VISIT (OUTPATIENT)
Dept: ENDOCRINOLOGY | Facility: HOSPITAL | Age: 69
End: 2024-11-08
Payer: COMMERCIAL

## 2024-11-08 VITALS
BODY MASS INDEX: 33.05 KG/M2 | DIASTOLIC BLOOD PRESSURE: 80 MMHG | HEART RATE: 66 BPM | HEIGHT: 65 IN | WEIGHT: 198.4 LBS | SYSTOLIC BLOOD PRESSURE: 130 MMHG

## 2024-11-08 DIAGNOSIS — E04.1 THYROID NODULE: ICD-10-CM

## 2024-11-08 DIAGNOSIS — E78.5 HYPERLIPIDEMIA, UNSPECIFIED HYPERLIPIDEMIA TYPE: ICD-10-CM

## 2024-11-08 DIAGNOSIS — I10 ESSENTIAL HYPERTENSION: ICD-10-CM

## 2024-11-08 DIAGNOSIS — E11.9 TYPE 2 DIABETES MELLITUS WITHOUT COMPLICATION, WITHOUT LONG-TERM CURRENT USE OF INSULIN (HCC): Primary | ICD-10-CM

## 2024-11-08 PROCEDURE — 99214 OFFICE O/P EST MOD 30 MIN: CPT | Performed by: PHYSICIAN ASSISTANT

## 2024-11-08 NOTE — PROGRESS NOTES
Yun Simmons 69 y.o. female MRN: [id number]    Encounter: [id number]      Assessment & Plan     Assessment:  This is a 69 y.o.-year-old female with type 2 diabetes with hypertension and hyperlipidemia, and thyroid nodule.    Plan:  1. Type 2 diabetes: Most recent hemoglobin A1c came back at 6.4.  He was unable to tolerate Mounjaro, so medication was discontinued.  She was only on it for about a month, and since her last A1c was 4 months ago, I think this current A1c was affected very little by Mounjaro.  I am fine with her just continue with metformin 1000 mg twice a day.  If needed in the future we still could try a sulfonylurea or DPP 4 inhibitor if needed. Ultimately continuing with lifestyle modifications to help improve glucose levels will also help.  Just because it has been a while since she has been seen I would like her to follow-up again in 3 months with lab work completed prior to visit.  Ultimately will likely extend out her office visits once again as she is typically well-controlled.     2. Thyroid nodule: Most recent thyroid ultrasound was completed in May 2024 and revealed stable thyroid nodule.  Biopsy in the past and was benign.  Was previously following up at [place] with surgical oncology, but provider she was seeing has recently left the practice and just wants to have it monitored here at this point.  Will continue with yearly ultrasounds at this time.     3. Hypertension:  Normotensive in the office today.  Kidney function remains stable.  Electrolytes normal.  Continue with current medications.  Repeat CMP prior to next office visit.     4. Hyperlipidemia:  Triglycerides slightly elevated.  Continue with current medications.  Will continue monitor at this time.     5. History of mild hypercalcemia:  Recent lab work shows corrected calcium within normal range.  PTH and vitamin-D were normal.  Will continue to monitor at this time.    CC: Type 2 diabetes and thyroid follow-up    History of  Present Illness     HPI:  Yun Simmons is a 68 y.o. female with history of type 2 diabetes for about 8 years and thyroid nodules.  Who presents for a follow-up appointment.  Has not been seen in our office since August 2021.  Care was being managed by her PCP.  She is maintained on oral agents at home including metformin 1000 mg twice a day.  Deviously on Mounjaro, but had nausea with medication.  Was prescribed Farxiga in the past but had side effects with medication.  Was then recommended to try Jardiance, but nephrologist did not want her utilizing that medication.  She denies any polyuria, polydipsia, nocturia, blurry vision.  She denies history of peripheral neuropathy, nephropathy, retinopathy.  At this time is doing well and would feel more comfortable following up with endocrinology to help manage her glucose levels and prevent it from increasing further.  He does have concerns with dry mouth at this time.     Hypoglycemic episodes: No.      The patient's last eye exam was in May 2024.   Last diabetic foot exam was completed July 2024.     Blood Sugar/Glucometer/Pump/CGM review: Blood sugar logs present at today's office visit.     For hyperlipidemia, she is maintained on atorvastatin 40 mg daily without myalgias.  For hypertension she continues on losartan 50 mg daily.  Denies any headaches, vision changes, chest pain, MI or strokelike symptoms.     He was found to have a thyroid nodule on CTA in August 2021.  3 nodules were noted largest measuring 3.4 cm in size.  Which met biopsy criteria.  Biopsy came back benign, but she has been following up over the past 3 years with surgical oncology at [place].  Provider she was seen recently left the practice, and wants to come back here to monitor.  There has been no increase in size of the nodule.  Thyroid lab work has come back normal.  Denies any hyperthyroid or hypothyroid symptoms.  Denies any neck compressive symptoms.     Review of Systems    Constitutional:  Positive for fatigue. Negative for activity change, appetite change, diaphoresis and unexpected weight change.   HENT:  Negative for sore throat, trouble swallowing and voice change.         Dry mouth   Eyes:  Negative for visual disturbance.   Respiratory:  Negative for chest tightness and shortness of breath.    Cardiovascular:  Negative for chest pain, palpitations and leg swelling.   Gastrointestinal:  Negative for abdominal pain, constipation, diarrhea, nausea and vomiting.   Endocrine: Negative for cold intolerance, heat intolerance, polydipsia, polyphagia and polyuria.   Genitourinary:  Negative for frequency.   Musculoskeletal:  Positive for arthralgias and back pain.   Skin:  Negative for rash and wound.   Neurological:  Positive for headaches. Negative for dizziness, tremors, weakness, light-headedness and numbness.   Hematological:  Negative for adenopathy.   Psychiatric/Behavioral:  Negative for dysphoric mood and sleep disturbance. The patient is not nervous/anxious.        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     Diabetes mellitus (HCC)     GERD (gastroesophageal reflux disease)     Renal disorder     Sleep apnea      Past Surgical History:   Procedure Laterality Date     SECTION      COLONOSCOPY      RENAL CYST EXCISION      SHOULDER SURGERY      plastic hardware    TONSILLECTOMY      US GUIDED THYROID BIOPSY  2021     Social History   Social History     Substance and Sexual Activity   Alcohol Use Never     Social History     Substance and Sexual Activity   Drug Use Not Currently     Social History     Tobacco Use   Smoking Status Former    Current packs/day: 0.00    Average packs/day: 0.5 packs/day for 8.8 years (4.4 ttl pk-yrs)    Types: Cigarettes    Start date:     Quit date: 10/29/1981    Years since quittin.0   Smokeless Tobacco Never     Family History:    Family History   Problem Relation Age of Onset    COPD Mother     Lung cancer Mother     Lung cancer Father     Hypertension Father     No Known Problems Sister     Hypertension Brother     Colon polyps Brother     Kidney disease Brother     Colon polyps Brother     Stroke Brother     Kidney disease Brother     Hypertension Brother     Hyperlipidemia Brother     Gestational diabetes Sister     Colon cancer Maternal Aunt        Meds/Allergies   Current Outpatient Medications   Medication Sig Dispense Refill    acetaZOLAMIDE (DIAMOX) 125 mg tablet 125 mg p.o. in a.m. and 250 mg in evening 270 tablet 4    Cholecalciferol (Vitamin D3) 50 MCG (2000 UT) capsule Take 2,000 Units by mouth every other day      ciclopirox (PENLAC) 8 % solution Apply 1 Application topically daily at bedtime (Patient not taking: Reported on 3/18/2024)      clopidogrel (PLAVIX) 75 mg tablet Take 75 mg by mouth daily      famotidine (PEPCID) 40 MG tablet Take 40 mg by mouth Daily at 2am      ferrous sulfate 325 (65 FE) MG EC tablet Take 325 mg by mouth Two times a week (Patient not taking: Reported on 3/18/2024)      fluticasone (FLONASE) 50 mcg/act nasal spray 1 spray into each nostril daily      gabapentin (NEURONTIN) 100 mg capsule Take 100 mg by mouth daily (Patient not taking: Reported on 3/18/2024)      halobetasol (ULTRAVATE) 0.05 % ointment Apply topically if needed      losartan (COZAAR) 50 mg tablet TAKE 1 TABLET BY MOUTH EVERY DAY 90 tablet 1    metFORMIN (GLUCOPHAGE) 1000 MG tablet TAKE 1 TABLET BY MOUTH TWICE A DAY WITH MEALS 180 tablet 3    Mounjaro 2.5 MG/0.5ML Inject 2.5 mg under the skin every 7 days      Multiple Vitamins-Minerals (PreserVision AREDS 2+Multi Vit) CAPS Take 1 tablet by mouth Daily at 2am      Multiple Vitamins-Minerals (PRESERVISION AREDS PO) Take by mouth 2 (two) times a day (Patient not taking: Reported on 3/18/2024)      ondansetron (ZOFRAN-ODT) 8 mg disintegrating tablet Take 8 mg by mouth every 8 (eight)  hours as needed      pantoprazole (PROTONIX) 40 mg tablet TAKE 1 TABLET BY MOUTH EVERY DAY 90 tablet 5    Potassium Citrate ER 15 MEQ (1620 MG) TBCR Take 15 mEq by mouth 2 (two) times a day      simvastatin (ZOCOR) 40 mg tablet Take 40 mg by mouth every evening      zoledronic acid (Reclast) 5 mg/100 mL IV infusion (premix) as directed Intravenous (Patient not taking: Reported on 3/18/2024)       No current facility-administered medications for this visit.     Allergies   Allergen Reactions    Demerol [Meperidine] GI Intolerance     Other reaction(s): N/V    Milk-Related Compounds - Food Allergy Sneezing    Morphine GI Intolerance     Other reaction(s): N/V    Farxiga [Dapagliflozin] Other (See Comments)     Tongue burning    Meloxicam Swelling       Objective   Vitals: There were no vitals taken for this visit.    Physical Exam  Vitals and nursing note reviewed.   Constitutional:       General: She is not in acute distress.     Appearance: Normal appearance. She is not diaphoretic.   HENT:      Head: Normocephalic and atraumatic.   Eyes:      General: No scleral icterus.     Extraocular Movements: Extraocular movements intact.      Conjunctiva/sclera: Conjunctivae normal.      Pupils: Pupils are equal, round, and reactive to light.   Neck:      Thyroid: Thyroid mass present. No thyromegaly or thyroid tenderness.   Cardiovascular:      Rate and Rhythm: Normal rate and regular rhythm.      Heart sounds: No murmur heard.  Pulmonary:      Effort: Pulmonary effort is normal. No respiratory distress.      Breath sounds: Normal breath sounds. No wheezing.   Musculoskeletal:      Cervical back: Normal range of motion.      Right lower leg: No edema.      Left lower leg: No edema.   Lymphadenopathy:      Cervical: No cervical adenopathy.   Neurological:      Mental Status: She is alert and oriented to person, place, and time. Mental status is at baseline.      Sensory: No sensory deficit.      Gait: Gait normal.  "  Psychiatric:         Mood and Affect: Mood normal.         Behavior: Behavior normal.         Thought Content: Thought content normal.         The history was obtained from the review of the chart, patient.    Lab Results:   Lab Results   Component Value Date/Time    Hemoglobin A1C 6.4 (H) 10/25/2024 11:52 AM    Hemoglobin A1C 7.2 06/20/2024 12:00 AM    BUN 15 10/25/2024 11:52 AM    Potassium 4.3 10/25/2024 11:52 AM    Chloride 109 (H) 10/25/2024 11:52 AM    CO2 19 (L) 10/25/2024 11:52 AM    Creatinine 0.69 10/25/2024 11:52 AM    AST 12 10/25/2024 11:52 AM    ALT 15 10/25/2024 11:52 AM    Protein, Total 6.4 10/25/2024 11:52 AM    Albumin 4.1 10/25/2024 11:52 AM    Globulin, Total 2.3 10/25/2024 11:52 AM           Imaging Studies: Results Review Statement: No pertinent imaging studies reviewed.    Portions of the record may have been created with voice recognition software. Occasional wrong word or \"sound a like\" substitutions may have occurred due to the inherent limitations of voice recognition software. Read the chart carefully and recognize, using context, where substitutions have occurred.    "

## 2024-11-08 NOTE — PATIENT INSTRUCTIONS
Continue to monitor diet, and maintain physical activity.  Make sure to drink plenty water throughout the day.       Continue metformin 1000 mg twice a day.       Continue monitoring blood sugar daily.       Ultrasound for thyroid will be due next year.     Follow up in 3 months with lab work prior to visit.

## 2024-12-11 ENCOUNTER — OFFICE VISIT (OUTPATIENT)
Dept: NEUROLOGY | Facility: CLINIC | Age: 69
End: 2024-12-11
Payer: COMMERCIAL

## 2024-12-11 VITALS
TEMPERATURE: 98 F | BODY MASS INDEX: 32.42 KG/M2 | SYSTOLIC BLOOD PRESSURE: 148 MMHG | HEIGHT: 65 IN | OXYGEN SATURATION: 93 % | HEART RATE: 77 BPM | RESPIRATION RATE: 18 BRPM | DIASTOLIC BLOOD PRESSURE: 83 MMHG | WEIGHT: 194.6 LBS

## 2024-12-11 DIAGNOSIS — D50.9 IRON DEFICIENCY ANEMIA: ICD-10-CM

## 2024-12-11 DIAGNOSIS — G43.009 MIGRAINE WITHOUT AURA AND WITHOUT STATUS MIGRAINOSUS, NOT INTRACTABLE: Primary | ICD-10-CM

## 2024-12-11 PROCEDURE — 99214 OFFICE O/P EST MOD 30 MIN: CPT | Performed by: PSYCHIATRY & NEUROLOGY

## 2024-12-11 NOTE — PATIENT INSTRUCTIONS
"  Glad that you already have labs pending through other providers otherwise I typically will check labs when we make adjustments    Continue to follow regularly with eye doctor    Glad that your nephrologist has ruled out primary hyperparathyroidism, But that they continue to follow the thyroid nodule yearly since it is a large.    Discuss with nephrology whether they would be ok with 1-5 days of dexamethasone/Decadron 2 to 4 mg -  if needed to break this in the future.   -Glad to hear that you have tolerated the low-dose of prednisone your PCP has prescribed I just wanted to be extra cautious and not go against any nephrology recommendations    Agree with following up with ENT for post nasal drip and if they think it is appropriate could consider ipratropium bromide nasal spray      Do check with your PCP next time you see them to make sure you have a recent ferritin study as if your ferritin is below 75/80 I would recommend resuming iron repletion as it can also impact sleep depth and therefore everything else.        Headache/migraine treatment:   Rescue medications (for immediate treatment of a headache):   It is ok to take acetaminophen  if they help your headaches you should limit these to No more than 3 times a week to avoid medication overuse/rebound headaches.     \"exedrin tension\" - caffeine/tylenol       Prescription preventive medications for headaches/migraines   (to take every day to help prevent headaches - not to take at the time of headache):  [x]     -     acetaZOLAMIDE (DIAMOX) 125 mg around 10 AM, 2 PM and 6 PM and absolutely if needed could add a 10 PM dose or could even consider a long-acting dose overnight if ever needed, but we will try and keep you on the lowest dose we need to    If ever needed we could also have you take the 125 at 10 AM 2 PM and continue the 250 mg at 6 PM      Certainly if you happen to awaken at night you absolutely could take 125 mg or 250 mg overnight as well    If " you were to increase these medications and if needed there is a longer acting form at 500 mg twice a day.      - if a lower dose is helpful, can stay lower, if higher dose causes side effects, go back to last tolerated dose.  If you get all the way up to the dose recommended and is not helping enough let me know as I will absolutely go higher.    - make sure to stay hydrated while on this as can cause dehydration since that is it's purpose to take fluid off.   - most common side effect is tingling of the nerves at times  - can cause electrolyte disturbances, but not typically in any significant way. However, be cautious if taking with other meds that lower potassium like hydrochlorothiazide etc    *Typically these types of medications take time until you see the benefit, although some may see improvement in days, often it may take weeks, especially if the medication is being titrated up to a beneficial level. Please contact us if there are any concerns or questions regarding the medication.     Lifestyle Recommendations:  [x] SLEEP - Maintain a regular sleep schedule: Adults need at least 7-8 hours of uninterrupted a night. Maintain good sleep hygiene:  Going to bed and waking up at consistent times, avoiding excessive daytime naps, avoiding caffeinated beverages in the evening, avoid excessive stimulation in the evening and generally using bed primarily for sleeping.  One hour before bedtime would recommend turning lights down lower, decreasing your activity (may read quietly, listen to music at a low volume). When you get into bed, should eliminate all technology (no texting, emailing, playing with your phone, iPad or tablet in bed).  [x] HYDRATION - Maintain good hydration.  Drink  2L of fluid a day (4 typical small water bottles)  [x] DIET - Maintain good nutrition. In particular don't skip meals and try and eat healthy balanced meals regularly.  [x] TRIGGERS - Look for other triggers and avoid them: Limit  caffeine to 1-2 cups a day or less. Avoid dietary triggers that you have noticed bring on your headaches (this could include aged cheese, peanuts, MSG, aspartame and nitrates).  [x] EXERCISE - physical exercise as we all know is good for you in many ways, and not only is good for your heart, but also is beneficial for your mental health, cognitive health and  chronic pain/headaches. I would encourage at the least 5 days of physical exercise weekly for at least 30 minutes.     Education and Follow-up  [x] Please call with any questions or concerns. Of course if any new concerning symptoms go to the emergency department.  [x] Follow up 4-6 months and if everything is exactly the same certainly you could push it back to 6-months, sooner if needed

## 2024-12-11 NOTE — PROGRESS NOTES
Review of Systems   Constitutional: Negative.    HENT: Negative.     Eyes: Negative.    Respiratory: Negative.     Cardiovascular: Negative.    Gastrointestinal: Negative.    Endocrine: Negative.    Genitourinary: Negative.    Musculoskeletal: Negative.    Skin: Negative.    Allergic/Immunologic: Negative.    Neurological:  Positive for headaches (Not having them anymore).   Hematological: Negative.    Psychiatric/Behavioral: Negative.

## 2024-12-11 NOTE — PROGRESS NOTES
Neurology Ambulatory Visit  Name: Yun Simmons       : 1955       MRN: [id number]   Encounter Provider: Tiffany Magallanes MD   Encounter Date: 2024  Encounter department: [facility]    1. Migraine without aura and without status migrainosus, not intractable  2. Iron deficiency anemia  -     Iron Panel (Includes Ferritin, Iron Sat%, Iron, and TIBC); Future          Assessment/Plan:   Yun Simmons is a delightful 69 y.o. female with a past medical history that includes hypercalcemia, type 2 diabetes mellitus, thyroid versus parathyroid nodule, DESTINEY on CPAP (), hypertension, postural hypotension, normocytic anemia, hyperlipidemia,  headaches, BMI over 30, visual disturbance, autosomal dominant polycystic kidney disease*, kidney stone, s/p shoulder surgery referred here for evaluation of headache.  My initial evaluation 2023     Migraine without aura and without status migrainosus, not intractable  Visual disturbance-follows with 2 eye specialists  Features of idiopathic intracranial hypertension on imaging and history without papilledema not meeting criteria for IIH  DESTINEY on CPAP (REM 47)   She reports a long history of headaches and migraines dating back to her teens and early 20s that seemed to significantly improve following diagnosis and treatment of DESTINEY in the .  Initially, she felt that was the last time she had headaches, but in recollection she recalls she did follow with a neurologist in [place] in the early  and did round round of Botox for chronic migraine.  She believes migraines may have improved following menopause.  On 3/22/2023, she woke up with a headache and was playing cards when she noticed unilateral peripheral vision loss on the left, unclear if she woke up with that as well, but it persisted and she went to the emergency department where she was admitted for stroke evaluation and found not to have had a stroke.  MRI brain was  unremarkable for acute pathology, as was CTA head and neck, carotid ultrasound, TTE, and she was already on aspirin which was continued as well as risk factor control.  She followed up with eye doctor last month and no pertinent issues were found except for early macular degeneration.  She reports the pain was right temporal and therefore ESR and CRP were also obtained which were only slightly elevated and temporal ultrasound was unremarkable as well.  -As of 5/2/2023: She reports no further migraines, no further vision changes.  We discussed most likely etiology could be either suboptimally treated DESTINEY contributing to exacerbation of migraine or related to the hypercalcemia.  She mentions some mild memory issues which are not atypical for age and MOCA today was normal.  - as of 6/29/2023: She returns sooner than expected after an incident on 6/6/2023 as detailed in HPI where she had left hand paresthesias that radiate up to the left neck and then down the left side of the body lasting approximately 20 minutes not associated with headache.  She was admitted to the hospital for MRI brain and MRA head were unremarkable for acute pathology and we discussed for my review she does appear to have subtle signs of idiopathic intracranial hypertension and I suggest trial of acetazolamide/Diamox to see if this is the cause of the visual issues she has been following with 2 eye doctors for despite no papilledema.  She is using her CPAP and AHI is less than 1 without leak.  Following up with nephrology for hypercalcemia.  We discussed that I am not convinced the episode was a TIA, but since the inpatient neurologist recommended transition to Plavix, I would not disagree with their recommendations out of precaution.    - as of 11/7/2023: She reports doing very well on acetazolamide/Diamox 250 mg twice daily with only 1 headache, if that, in a month that is not that severe and more of a pressure sensation that acetaminophen  helps.  She continues to use her CPAP anytime she is sleeping and we will try and find the old sleep records as she wonders what her AHI was.  She is following closely with ophthalmology every 6 months to different specialists and no papilledema noted.  No return of the symptoms that admitted her in June.    - as of 3/18/2024: She reports headaches have resolved on acetazolamide 125 mg twice daily which she decreased from 250 mg twice daily around November per PCP recommendations due to chloride being elevated by 1 number at 107 and we discussed certainly if needed could increase the medication again although would recommend every 4 hours rather than just twice a day as I suspect she may have wearing off midday.  Recommended resuming PreserVision through eye doctor for macular degeneration which she recently discontinued and she is having more floaters, follows closely with two eye doctors.  She also stopped iron recently which we discussed could have a negative impact on sleep.  Otherwise she is doing excellent using her CPAP every night all night.  - as of 7/30/2024: She reports no headaches or migraines on acetazolamide 125 mg in a.m. and 250 mg in p.m. with no bothersome side effects.  Recent labs reviewed and follow-up CMP scheduled through other providers although such a tiny dose unlikely to have findings that would make as have to discontinue.  Following with nephrology closely and discussed her overall health as well today.  She is treating sleep apnea with CPAP all night which tends to be around 6 hours and wakes around 4 AM and we discussed ideally if she could fall back asleep at 4 AM it could help with her fatigue that started after she began Mounjaro injections 3 weeks ago.   - as of 12/11/2024: She reports that she is doing wonderfully on acetazolamide/Diamox 125/250 mg without any significant headaches or migraines.  She is treating her sleep apnea all night every night and cannot sleep without it  which is wonderful.  She is taking her potassium through nephrology and has not had a kidney stone in years, but is looking for a smaller pill that would be easier to swallow.  We also discussed her acetazolamide dosing it sounds like she could be having some wearing off effect between doses and I recommend considering 125 mg at 10 AM, 2 PM, 6 PM and if needed could take 1 at 10 PM and overnight if needed for wearing off but will try and keep at the same dose for now at 375 mg in a day.       Workup:  - MOCA normal 27/30 (-2 recall, -1 repeat sentence)   -MRI brain pituitary with and without contrast 3/22/2023: 1.  Unremarkable exam of the pituitary.  No evidence of lesion.  2.  Mild microangiopathic change.  No evidence of acute infarct, intracranial hemorrhage or mass.  -Carotid ultrasound 3/22/2023: There is no evidence of giant cell arteritis, aneurysm, or significant stenosis  noted in the superficial temporal artery and its branches  -CTA head and neck with and without contrast 3/22/2023: No significant stenosis of the cervical carotid or vertebral arteries.  No significant intracranial stenosis, large vessel occlusion or aneurysm.  - MRI brain without contrast 6/7/2023: 1. No acute infarction.   2. Mild chronic microvascular changes. Remote white matter change just   lateral to the atrium of left lateral ventricle.   3. Tiny susceptible focus, left temporal lobe, as described (It likely reflects a remote petechial hemorrhage or  small vascular calcification). Neither remote   petechial hemorrhage or small vascular calcification.   4. No mass effect or midline shift.   *Above per radiology, as of my retrospective review 11/7/2023 we discussed she has a partially empty sella but nearly completely empty, right greater than left optic nerve sheath prominence with slight tortuosity, ventricles are not extremely tight although if so a little bit on the right, cerebellar tonsils overlie the foramen magnum without  Chiari   - MRA brain 6/7/2023: Evaluation of the petrous, cavernous and supraclinoid portions of the distal internal carotid arteries as well as the anterior cerebral and middle   cerebral arteries is unremarkable without evidence of aneurysm, stenosis or   major vessel occlusion.     -MRA neck and carotid ultrasound 6/7/2023: negative    Preventative:  - we discussed headache hygiene and lifestyle factors that may improve headaches  -   acetaZOLAMIDE (DIAMOX) 125 mg at 10 AM, 2 PM, 6 PM and (if needed could take 1 at 10 PM and overnight if needed for wearing off but will try and keep at the same dose for now at 375 mg in a day). With plenty of room to adjust up or down as needed/tolerated but will continue here for now at this tiny dose, as she is doing well.  Discussed proper use, possible side effects and risks.  - Currently on through other providers: Losartan/Cozaar 50 mg daily, vitamin D 2000 units every other day,  switched to plavix in June 2023, potassium through nephology 15 mEq BID prior (does not like the large pill though), PreserVision for her eyes, potassium citrate through nephrology- she hated it due to size but taking 15 mEq BID   - Past/ failed/contraindicated: Vasotec, gabapentin 100 mg   - future options: Topiramate although with caution due to history of kidney stones, verapamil, CGRP med, botox    Rescue:  - recommend not taking over-the-counter or prescription pain medications more than 3 days per week to prevent medication overuse/rebound headache  - Prescription abortive not indicated at this time, we discussed most likely thing to help for prolonged episode in the future would be steroids if nephrology would be agreeable for short course - she will ask, but PCP has prescribed   - Currently on through other providers: OTC meds  - Past/ failed/contraindicated: Fioricet  -Past/tolerated: PCP gave her short dose of steroids for her current back pain, heel issue, TMJ  - future options:   "Triptan, prochlorperazine, Toradol IM or p.o., could consider trial of 5 days of Depakote 500 mg nightly or dexamethasone 2 mg daily for prolonged migraine, ubrelvy, reyvow, nurtec  *Would use the lowest dose of steroids if needed    Patient instructions        Glad that you already have labs pending through other providers otherwise I typically will check labs when we make adjustments    Continue to follow regularly with eye doctor    Glad that your nephrologist has ruled out primary hyperparathyroidism, But that they continue to follow the thyroid nodule yearly since it is a large.    Discuss with nephrology whether they would be ok with 1-5 days of dexamethasone/Decadron 2 to 4 mg -  if needed to break this in the future.   -Glad to hear that you have tolerated the low-dose of prednisone your PCP has prescribed I just wanted to be extra cautious and not go against any nephrology recommendations    Agree with following up with ENT for post nasal drip and if they think it is appropriate could consider ipratropium bromide nasal spray      *Do check with your PCP next time you see them to make sure you have a recent ferritin study as if your ferritin is below 75/80 I would recommend resuming iron repletion as it can also impact sleep depth and therefore everything else.        Headache/migraine treatment:   Rescue medications (for immediate treatment of a headache):   It is ok to take acetaminophen  if they help your headaches you should limit these to No more than 3 times a week to avoid medication overuse/rebound headaches.     \"exedrin tension\" - caffeine/tylenol       Prescription preventive medications for headaches/migraines   (to take every day to help prevent headaches - not to take at the time of headache):  [x]     -     acetaZOLAMIDE (DIAMOX) 125 mg around 10 AM, 2 PM and 6 PM and absolutely if needed could add a 10 PM dose or could even consider a long-acting dose overnight if ever needed, but we will try " and keep you on the lowest dose we need to    If ever needed we could also have you take the 125 at 10 AM 2 PM and continue the 250 mg at 6 PM      Certainly if you happen to awaken at night you absolutely could take 125 mg or 250 mg overnight as well    If you were to increase these medications and if needed there is a longer acting form at 500 mg twice a day.      - if a lower dose is helpful, can stay lower, if higher dose causes side effects, go back to last tolerated dose.  If you get all the way up to the dose recommended and is not helping enough let me know as I will absolutely go higher.    - make sure to stay hydrated while on this as can cause dehydration since that is it's purpose to take fluid off.   - most common side effect is tingling of the nerves at times  - can cause electrolyte disturbances, but not typically in any significant way. However, be cautious if taking with other meds that lower potassium like hydrochlorothiazide etc    *Typically these types of medications take time until you see the benefit, although some may see improvement in days, often it may take weeks, especially if the medication is being titrated up to a beneficial level. Please contact us if there are any concerns or questions regarding the medication.     Lifestyle Recommendations:  [x] SLEEP - Maintain a regular sleep schedule: Adults need at least 7-8 hours of uninterrupted a night. Maintain good sleep hygiene:  Going to bed and waking up at consistent times, avoiding excessive daytime naps, avoiding caffeinated beverages in the evening, avoid excessive stimulation in the evening and generally using bed primarily for sleeping.  One hour before bedtime would recommend turning lights down lower, decreasing your activity (may read quietly, listen to music at a low volume). When you get into bed, should eliminate all technology (no texting, emailing, playing with your phone, iPad or tablet in bed).  [x] HYDRATION - Maintain  good hydration.  Drink  2L of fluid a day (4 typical small water bottles)  [x] DIET - Maintain good nutrition. In particular don't skip meals and try and eat healthy balanced meals regularly.  [x] TRIGGERS - Look for other triggers and avoid them: Limit caffeine to 1-2 cups a day or less. Avoid dietary triggers that you have noticed bring on your headaches (this could include aged cheese, peanuts, MSG, aspartame and nitrates).  [x] EXERCISE - physical exercise as we all know is good for you in many ways, and not only is good for your heart, but also is beneficial for your mental health, cognitive health and  chronic pain/headaches. I would encourage at the least 5 days of physical exercise weekly for at least 30 minutes.     Education and Follow-up  [x] Please call with any questions or concerns. Of course if any new concerning symptoms go to the emergency department.  [x] Follow up 4-6 months and if everything is exactly the same certainly you could push it back to 6-months, sooner if needed       CC:   We had the pleasure of evaluating Yun Simmons in neurological consultation today. Yun Simmons is a   right handed female who presents today for evaluation of headaches.     History obtained from patient as well as available medical record review.  History of Present Illness:   Interval history as of 12/11/2024  - Of note/managed by others:   Since last visit followed up with endocrinology for type 2 diabetes mellitus thyroid nodule hypertension hyperlipidemia  - no significant new or concerning neurologic symptoms since last visit reported    - diagnostics of note (please see EMR for others/details):   -10/25/2024   Hematocrit and hemoglobin normal  Phosphorus 2.4  PTH 42  CMP PE unremarkable except for chloride 109 and bicarb 19,   calcium 9.8  Vitamin D 43/6  - last eye exam: - 5/21/24 followed up with her retinal specialist who sent OCT report with pictures of RNFL and she was told everything looks good, no  concerns, report uploaded today as well as another report uploaded from the same day on 6/3/2024  --No papilledema/never has had, barely has any drusen in either eye and they feel it is just drusen currently and not true macular degeneration, asteroid hyalosis OS stable   *As of 12/11/2024 some floaters midday that we discussed I suspect could be related to wearing off of acetazolamide potentially but continue to follow with eye doctor for other causes    - sleep: - DESTINEY on CPAP (REM 47)   Sleeps with it every night, can't not sleep without it     Headaches and migraines   No migraines  Slight pressure bitemporal in am after waking and before bed briefly that self resolves      Preventative:   -  acetazolamide/Diamox 125 mg twice daily (anywhere 8-10AM) and 250 mg about 6-7 pm at night    -she has not noticed any wearing off effect midday, has been asked if floaters could be related- tingling occasionally and not bothersome  No reported bothersome side effects   - Currently on through other providers: Losartan/Cozaar 50 mg daily, vitamin D 2000 units every other day,  switched to plavix in June 2023, potassium through nephology 15 mEq BID prior (does not like the large pill though), PreserVision for her eyes, potassium citrate through nephrology- she hated it due to size but taking 15 mEq BID     Interval history as of 7/30/2024  - no significant new or concerning neurologic symptoms since last visit   - 5/21/24 followed up with her retinal specialist who sent OCT report with pictures of RNFL and she was told everything looks good, no concerns, report uploaded today as well as another report uploaded from the same day on 6/3/2024  --No papilledema/never has had, barely has any drusen in either eye and they feel it is just drusen currently and not true macular degeneration, asteroid hyalosis OS stable   - DESTINEY on CPAP (REM 47) bed at 10 and up at 4 and hard to get back to sleep - at least 6-7 hours with it   -Since  last visit has been following with nephrology - doing another 24 hour urine collection and wanted her to see endo for the monjaro (she had a bad reaction to jardiance) , endocrinology and follow-up of thyroid nodule - stable and large and if any larger they will do something   -Endocrinology ordered CMP and A1c for 3 months from now which can help for monitoring of electrolytes  - watching renal stone for years     Headaches and migraines   She reports she continues to do well on acetazolamide  No migraines or headaches  Some fatigue and nausea from Mounjaro - 3 weeks now, nausea after and for 6 days decreased appetite, GERD is a little worse too, her PCP said add famotidine and protonix     Preventative:   -  acetazolamide/Diamox 125 mg twice daily (anywhere 8-10AM) and 250 mg about 6-7 pm at night    -she has not noticed any wearing off effect midday - tingling occasionally and not bothersome  Decreased per PCP from 250 mg twice a day around November when the chloride came back slightly elevated 107 (although that is wnl in our lab we discussed), seeing nephrology soon  Denies bothersome side effects       Interval history as of 3/18/2024  - no significant new or concerning neurologic symptoms since last visit   - ENT for post nasal drip and cough and everything clear - famotidine/pepcid and didn't help   - stopped zinc pills for eyes, for mac degeneration - sees more floaters now - a couple of weeks ago - when up in the morning and when going to be (- Ophthalmology 10/6/2023 no papilledema, drusen - now sees DM and retina specialist ever since the episode due to peripheral vision - seeing one or the other every 6 months)  - 6 hours now if doesn't wake up in the middle of the night at 4/5,  CPAP all night every night, naps during the day without machine   - back pain the past 3-4 weeks, started one day for no good reason - if doesn't breathe feels good, but if dont breathe doesn't feel it, given gabapentin and  "didn't help, PT coming up-we discussed back pain would not be my area of expertise and I am glad she is seeing other providers for it could always consider orthopedics, pain management, physiatry for further evaluation if needed.    Headaches and migraines   She reports no headaches at all really but when pressed reports maybe they occur rarely  For sure less than 1 a month- \"basically none\"      Preventative:   -  acetazolamide/Diamox 125 mg twice daily (anywhere 8-10) and about 6-7 pm at night   -she has not noticed any wearing off effect midday - tingling occasionally and not bothersome  Decreased from 250 mg twice a day around November when the chloride came back slightly elevated 107, per PCP,   - was taking iron every other day for a while but now as didn't refill she also discontinued her PreserVision recently  Denies bothersome side effects        Interval history as of 11/7/2023  - no significant new or concerning neurologic symptoms since last visit   -5/9/2023 she followed up with pulm for DESTINEY - \"she is wearing her CPAP all night every night.  When she wears it her AHI is less than 1 and she has no leak\"  - Ophthalmology 10/6/2023 no papilledema, drusen - now sees DM and retina specialist ever since the episode due to peripheral vision - seeing one or the other every 6 months   - cut back on coffee too  - works hard cleaning houses to stay active   -She reports she feels done more for her to help her than some the other doctors which is very sweet    Headaches and migraines   She reports doing very well on acetazolamide/Diamox with only 1 headache a month that is not that severe and more of a pressure sensation that acetaminophen helps. If that     Preventative:   Trial of acetazolamide/Diamox 250 mg twice daily- 630-930 am and 6-8 pm -she has not noticed any wearing off effect midday - tingling occasionally and not bothersome    - Currently on through other providers: Losartan/Cozaar 50 mg daily, vitamin " "D 2000 units daily, aspirin 81 mg daily switched to plavix in June 2023, potassium started by nephology     Abortive:   - acetaminophen helps  Denies bothersome side effects      Interval history as of 6/29/2023  -5/9/2023 she followed up with pulm for DESTINEY - \"she is wearing her CPAP all night every night.  When she wears it her AHI is less than 1 and she has no leak\"  -5/19/2023 followed up with PCP - changed to [facility]   -6/1/2023 followed up with nephrology - 24 hour urine study   -6/15/2023 followed up with PCP -    Regular eye doctor and then retina specialist soon to follow up - Oct 6th     Vacation home up there in the [facility] if why they were up there    On 6/6/2023, she was awake already around 7:30 am and folding clothes and felt numb in the whole hand and ran some water under the hand and was massaging it and then it went up to the left neck and then the left breast and down the side, and woke up  and then drove in   lasted approximately 20 minutes and then resolved.  30 min drive    They consulted with a neurologist via telemedicine and was transferred to another facility to get MRI brain and MRA head.    Per discharge summary, she was eval by neurology and it was thought to be a TIA and she was started on dual antiplatelet therapy with aspirin Plavix for 3 weeks and they recommended continuing Plavix thereafter since they believe she failed aspirin therapy.      Headaches and migraines   None, first started March peripheral vision and now having floaters over the past week, first mentioned yesterday      History as of initial visit 5/2/2023:  Admitted 3/22/2023 to 3/23/23  -She presented to the emergency department with headache and was playing cards and noticed peripheral vision loss on the left side was gone (unsure when started and lasted into the night) with a right temporal with the left peripheral vision loss. Woke up with it, tried tylenol without relief, went to /76, NIHSS 1, MRI " Brain, CTA h/N, carotid US, mildly elevated CRP, ESR and temporal US unremarakable, A1c 6.5, TTE ok 65%, migraine resolved the next am. Migraine cocktail did not help in hospital    -MRI brain pituitary with and without contrast 3/22/2023: 1.  Unremarkable exam of the pituitary.  No evidence of lesion.  2.  Mild microangiopathic change.  No evidence of acute infarct, intracranial hemorrhage or mass.  -Carotid ultrasound 3/22/2023: There is no evidence of giant cell arteritis, aneurysm, or significant stenosis  noted in the superficial temporal artery and its branches.  -CTA head and neck with and without contrast 3/22/2023: No significant stenosis of the cervical carotid or vertebral arteries. No significant intracranial stenosis, large vessel occlusion or aneurysm.    -Further episodes since hospitalization and blood pressure runs 130s over 60s to 70s    Memory  Occasionally repeating things that were already said  Forgetfulness with phone, keys, wallet  No family history of dementia   No driving safety issues       Headaches started at what age? Teens, early 20s   How often do the headaches occur?   -Has a history of headaches in the past that seemed to resolve since using CPAP 1990s  - not had headaches in a long time since likely 1990s she had thought but on second thought did see someone in [place] early 2000s and did one round of botox  - as of 5/2/2023: no further episodes     Aura? Without      Last eye exam: in April 2023 saw retina specialist and no issues   Early mac degeneration     Where is your headache located and pain quality?   - right temple dull pain constant     What is the intensity of pain? Average: 8/10, worst likely 10 a long time ago/10  Associated symptoms:   [] Nausea       [] Vomiting         [] Photophobia     []Phonophobia      [] Osmophobia  [] Blurred vision - left side unsure if just left eye or both  [] Light-headed or dizzy   - infrequent, mild   [] Tinnitus   [] Hands or feet  tingle or feel numb/paresthesias    [] Ptosis      [] Facial droop  [] Lacrimation  [] Nasal congestion/rhinorrhea        Have you seen someone else for headaches or pain? Yes, PCP, neurology   Have you had trigger point injection performed and how often? No  Have you had Botox injection performed and how often? Yes - once on the forehead  Have you had epidural injections or transforaminal injections performed? no  Are you current pregnant or planning on getting pregnant? Menopause unknown due to partial hysterectomy   Have you ever had any Brain imaging? yes    What medications do you take or have you taken for your headaches?   ABORTIVE/pertinent p.r.n. Meds:      Not much     Past   fioricet    PREVENTIVE/pertinent daily meds:       Losartan/Cozaar 50 mg - years   Vitamin D 2000 units daily  Aspirin 81 mg daily    Past/ failed/contraindicated:  vasotec       LIFESTYLE  Sleep   - averages: 7-8 hours and naps       The following portions of the patient's history were reviewed and updated as appropriate: allergies, current medications, past family history, past medical history, past social history, past surgical history and problem list.    Pertinent family history:  Family history of headaches:  migraine headaches in mother, sister     Pertinent social history:  Work: retired  Education: college, [employer]  Lives with       Pertinent lab results:   See EMR for recent labs  -3/22/2023 CBC significant for hemoglobin 10.4 MCV 92  BMP unremarkable  LDL 52  ESR 41  CRP 11.7  A1c 6.5  10/27/2022 CMP remarkable for glucose 126, calcium 10.6  9/22/2021 vitamin D  TSH normal  PTH 32 with calcium of 10.4     Imaging: I have personally reviewed imaging and radiology read      - MRI brain without contrast 6/7/2023: 1. No acute infarction.   2. Mild chronic microvascular changes. Remote white matter change just   lateral to the atrium of left lateral ventricle.   3. Tiny susceptible focus, left temporal lobe, as described (It  likely reflects a remote petechial hemorrhage or  small vascular calcification). Neither remote   petechial hemorrhage or small vascular calcification.   4. No mass effect or midline shift.   *Above per radiology, as of my retrospective review 11/7/2023 we discussed she has a partially empty sella but nearly completely empty, right greater than left optic nerve sheath prominence with slight tortuosity, ventricles are not extremely tight although if so a little bit on the right, cerebellar tonsils overlie the foramen magnum without Chiari         - MRA brain 6/7/2023: Evaluation of the petrous, cavernous and supraclinoid portions of the    distal internal carotid arteries as well as the anterior cerebral and middle   cerebral arteries is unremarkable without evidence of aneurysm, stenosis or   major vessel occlusion.     -MRA neck 6/7/2023:  FINDINGS:   There is antegrade flow in the carotid and vertebral arteries.     Aortic Arch and Great Vessel Origins: Widely patent.   Subclavian Arteries: Widely patent.   Right Common Carotid Artery: Widely patent.   Right Internal Carotid Artery: Widely patent.   Right External Carotid Artery: Widely patent.   Left Common Carotid Artery: Widely patent.   Left Internal Carotid Artery: Widely patent.   Left External Carotid Artery: Widely patent.   Right Vertebral Artery: Widely patent.   Left Vertebral Artery: Widely patent.   Basilar Artery: Widely patent. The vertebrobasilar junction is tortuous.     - Carotid ultrasound 6/7/2023:  On the right side there is a mild amount of plaque seen. Peak systolic velocity   in the right internal carotid artery is  measured at 93 cm/sec. Flow in the   right vertebral artery is antegrade in direction. Internal carotid to common   carotid artery velocity ratio measures 1.1.   On the left side there is a mild amount of plaque seen. Peak systolic velocity   in left internal carotid artery is measured at 85 cm/sec. Flow in the left   vertebral  "artery is antegrade in direction.  Internal carotid to common carotid   artery velocity ratio measures 1.2.     Of note evaluation of the left neck is a heterogeneous appearing left thyroidal   lobe with areas of lucency and some more solid-appearing areas. Further   evaluation with thyroid ultrasound is recommended.        -MRI brain pituitary with and without contrast 3/22/2023: 1.  Unremarkable exam of the pituitary.  No evidence of lesion.  2.  Mild microangiopathic change.  No evidence of acute infarct, intracranial hemorrhage or mass.     -Carotid ultrasound 3/22/2023: There is no evidence of giant cell arteritis, aneurysm, or significant stenosis  noted in the superficial temporal artery and its branches.     -CTA head and neck with and without contrast 3/22/2023: No significant stenosis of the cervical carotid or vertebral arteries.  No significant intracranial stenosis, large vessel occlusion or aneurysm.     -CTA head and neck with and without contrast for dizziness 8/3/2021: 1. No evidence of acute intracranial hemorrhage.  2. No evidence of hemodynamic significant stenosis, aneurysm or dissection.      Objective       Physical Exam:                                                                 Vitals:            Constitutional:    /83 (BP Location: Right arm, Patient Position: Sitting, Cuff Size: Standard)   Pulse 77   Temp 98 °F (36.7 °C) (Temporal)   Resp 18   Ht 5' 5\" (1.651 m)   Wt 88.3 kg (194 lb 9.6 oz)   SpO2 93%   BMI 32.38 kg/m²   BP Readings from Last 3 Encounters:   12/11/24 148/83   11/08/24 130/80   07/30/24 113/54     Pulse Readings from Last 3 Encounters:   12/11/24 77   11/08/24 66   07/30/24 67         Well developed, well nourished, well groomed.        Psychiatric:  Normal behavior and appropriate affect        Able to answer questions appropriately, provide history of recent events   Normal language and spontaneous speech.  facial expression symmetric  symmetric bulk " throughout. no atrophy, fasciculations or significant abnormal movements noted during our visit from observation.   steady casual gait       ROS:  ROS obtained by medical assistant and reviewed, but if any symptoms listed below say negative, does not mean patient has not had this symptom since last visit, please see HPI for details of symptoms discussed this visit.  Regarding any abnormal or positive findings in ROS that are not neurologic related, patient instructed to address these issues with PCP or go to the ER if they are severe.    Review of Systems   Constitutional: Negative.    HENT: Negative.     Eyes: Negative.    Respiratory: Negative.     Cardiovascular: Negative.    Gastrointestinal: Negative.    Endocrine: Negative.    Genitourinary: Negative.    Musculoskeletal: Negative.    Skin: Negative.    Allergic/Immunologic: Negative.    Neurological:  Positive for headaches (Not having them anymore).   Hematological: Negative.    Psychiatric/Behavioral: Negative.        Administrative Statements  I have spent 10 minutes prior to or after the visit and 24 minutes during the visit, for a total time of 34 minutes in caring for this patient on the day of the visit/encounter including Prognosis, Risks and benefits of tx options, Instructions for management, Patient and family education, Importance of tx compliance, Risk factor reductions, Impressions, Counseling / Coordination of care, Documenting in the medical record, Reviewing / ordering tests, medicine, procedures  , Obtaining or reviewing history  , and Communicating with other healthcare professionals .

## 2025-01-27 LAB
CREAT ?TM UR-SCNC: 76.3 UMOL/L
EXT ALBUMIN URINE RANDOM: 11.3
HBA1C MFR BLD HPLC: 7.2 %
MICROALBUMIN/CREAT UR: 15 MG/G{CREAT}

## 2025-02-20 ENCOUNTER — OFFICE VISIT (OUTPATIENT)
Dept: ENDOCRINOLOGY | Facility: HOSPITAL | Age: 70
End: 2025-02-20
Payer: COMMERCIAL

## 2025-02-20 VITALS
SYSTOLIC BLOOD PRESSURE: 120 MMHG | WEIGHT: 196 LBS | DIASTOLIC BLOOD PRESSURE: 66 MMHG | HEIGHT: 65 IN | OXYGEN SATURATION: 96 % | HEART RATE: 66 BPM | BODY MASS INDEX: 32.65 KG/M2

## 2025-02-20 DIAGNOSIS — E66.01 MORBID OBESITY (HCC): ICD-10-CM

## 2025-02-20 DIAGNOSIS — E04.1 THYROID NODULE: ICD-10-CM

## 2025-02-20 DIAGNOSIS — E11.9 TYPE 2 DIABETES MELLITUS WITHOUT COMPLICATION, WITHOUT LONG-TERM CURRENT USE OF INSULIN (HCC): Primary | ICD-10-CM

## 2025-02-20 PROCEDURE — 99214 OFFICE O/P EST MOD 30 MIN: CPT | Performed by: PHYSICIAN ASSISTANT

## 2025-02-20 RX ORDER — POTASSIUM CITRATE AND CITRIC ACID MONOHYDRATE 1100; 334 MG/5ML; MG/5ML
20 SOLUTION ORAL
COMMUNITY
Start: 2025-02-11

## 2025-02-20 RX ORDER — ONDANSETRON 8 MG/1
TABLET, FILM COATED ORAL
COMMUNITY
Start: 2025-01-31

## 2025-02-20 NOTE — PATIENT INSTRUCTIONS
Continue to monitor diet, and maintain physical activity.  Make sure to drink plenty water throughout the day.       Continue metformin 1000 mg twice a day.       Can restart Mounjaro after trip. Could take it every 10-14 days if needed.    Continue monitoring blood sugar daily.       Ultrasound for thyroid before next office visit.     Follow up in 3 months with lab work prior to visit.

## 2025-02-20 NOTE — PROGRESS NOTES
Yun Simmons 69 y.o. female MRN: [id number]    Encounter: [id number]      Assessment & Plan     Assessment:  This is a 69 y.o.-year-old female with type 2 diabetes with hypertension and hyperlipidemia, and thyroid nodule.    Plan:  1. Type 2 diabetes: Most recent hemoglobin A1c came back at 7.2.  She is interested in restarting Mounjaro, but will wait till after her trip to [place].  I did inform her once again that if she has side effects with Mounjaro we could try a sulfonylurea or a DPP 4 inhibitor.  At this time she will continue with metformin 1000 mg twice a day.  Ultimately continuing with lifestyle modifications to help improve glucose levels will also help.  Just because it has been a while since she has been seen I would like her to follow-up again in 3 months with lab work completed prior to visit.  Ultimately will likely extend out her office visits once again as she is typically well-controlled.     2. Thyroid nodule: Most recent thyroid ultrasound was completed in May 2024 and revealed stable thyroid nodule.  Biopsy in the past and was benign.  Was previously following up at [facility] with surgical oncology, but provider she was seeing has recently left the practice and just wants to have it monitored here at this point.  Repeat thyroid ultrasound May 2025..     3. Hypertension:  Normotensive in the office today.  Kidney function remains stable.  Electrolytes normal.  Continue with current medications.  Repeat CMP prior to next office visit.     4. Hyperlipidemia:  Triglycerides slightly elevated.  Continue with current medications.  Will continue monitor at this time.     5. History of mild hypercalcemia:  Recent lab work shows corrected calcium within normal range.  PTH and vitamin-D were normal.  Will continue to monitor at this time.    CC: Type 2 diabetes and thyroid follow-up    History of Present Illness     HPI:  Yun Simmons is a 69 y.o. female with history of type 2 diabetes for about 9  years and thyroid nodules.  Who presents for a follow-up appointment.  She is maintained on oral agents at home including metformin 1000 mg twice a day.  Previously on Mounjaro, but had nausea with medication.  Since her A1c has gone up she is willing to retry Mounjaro to see if she can limit the amount of side effects she has.  Was prescribed Farxiga in the past but had side effects with medication.  Was then recommended to try Jardiance, but nephrologist did not want her utilizing that medication.  She denies any polyuria, polydipsia, nocturia, blurry vision.  She denies history of peripheral neuropathy, nephropathy, retinopathy.  At this time is doing well and would feel more comfortable following up with endocrinology to help manage her glucose levels and prevent it from increasing further.  Does not have any concerns or questions today.  Is heading down to [place] with her whole family for her 's birthday.     Hypoglycemic episodes: No.      The patient's last eye exam was in May 2024.   Last diabetic foot exam was completed July 2024.     Blood Sugar/Glucometer/Pump/CGM review: No blood sugar logs present at today's office visit.     For hyperlipidemia, she is maintained on atorvastatin 40 mg daily without myalgias.  For hypertension she continues on losartan 50 mg daily.  Denies any headaches, vision changes, chest pain, MI or strokelike symptoms.     He was found to have a thyroid nodule on CTA in August 2021.  3 nodules were noted largest measuring 3.4 cm in size.  Which met biopsy criteria.  Biopsy came back benign, but she has been following up over the past 3 years with surgical oncology at [facility].  Provider she was seen recently left the practice, and wants to come back here to monitor.  There has been no increase in size of the nodule.  Thyroid lab work has come back normal.  Denies any hyperthyroid or hypothyroid symptoms.  Denies any neck compressive symptoms.     Review of Systems    Constitutional:  Positive for fatigue. Negative for activity change, appetite change, diaphoresis and unexpected weight change.   HENT:  Negative for sore throat, trouble swallowing and voice change.         Dry mouth   Eyes:  Negative for visual disturbance.   Respiratory:  Negative for chest tightness and shortness of breath.    Cardiovascular:  Negative for chest pain, palpitations and leg swelling.   Gastrointestinal:  Negative for abdominal pain, constipation, diarrhea, nausea and vomiting.   Endocrine: Negative for cold intolerance, heat intolerance, polydipsia, polyphagia and polyuria.   Genitourinary:  Negative for frequency.   Musculoskeletal:  Positive for arthralgias and back pain.   Skin:  Negative for rash and wound.   Neurological:  Positive for headaches. Negative for dizziness, tremors, weakness, light-headedness and numbness.   Hematological:  Negative for adenopathy.   Psychiatric/Behavioral:  Negative for dysphoric mood and sleep disturbance. The patient is not nervous/anxious.        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    • Diabetes mellitus (HCC)    • GERD (gastroesophageal reflux disease)    • Renal disorder    • Sleep apnea      Past Surgical History:   Procedure Laterality Date   •  SECTION     • COLONOSCOPY     • RENAL CYST EXCISION     • SHOULDER SURGERY      plastic hardware   • TONSILLECTOMY     • US GUIDED THYROID BIOPSY  2021     Social History   Social History     Substance and Sexual Activity   Alcohol Use Never     Social History     Substance and Sexual Activity   Drug Use Not Currently     Social History     Tobacco Use   Smoking Status Former   • Current packs/day: 0.00   • Average packs/day: 0.5 packs/day for 8.8 years (4.4 ttl pk-yrs)   • Types: Cigarettes   • Start date:    • Quit date: 10/29/1981   • Years since quittin.3   Smokeless Tobacco Never      Family History:   Family History   Problem Relation Age of Onset   • COPD Mother    • Lung cancer Mother    • Lung cancer Father    • Hypertension Father    • No Known Problems Sister    • Hypertension Brother    • Colon polyps Brother    • Kidney disease Brother    • Colon polyps Brother    • Stroke Brother    • Kidney disease Brother    • Hypertension Brother    • Hyperlipidemia Brother    • Gestational diabetes Sister    • Colon cancer Maternal Aunt        Meds/Allergies   Current Outpatient Medications   Medication Sig Dispense Refill   • acetaZOLAMIDE (DIAMOX) 125 mg tablet 125 mg p.o. in a.m. and 250 mg in evening 270 tablet 4   • Cholecalciferol (Vitamin D3) 50 MCG (2000 UT) capsule Take 2,000 Units by mouth every other day     • clopidogrel (PLAVIX) 75 mg tablet Take 75 mg by mouth daily     • famotidine (PEPCID) 40 MG tablet Take 40 mg by mouth Daily at 2am     • fluticasone (FLONASE) 50 mcg/act nasal spray 1 spray into each nostril daily (Patient taking differently: 1 spray into each nostril as needed)     • halobetasol (ULTRAVATE) 0.05 % ointment Apply topically if needed     • losartan (COZAAR) 50 mg tablet TAKE 1 TABLET BY MOUTH EVERY DAY 90 tablet 1   • metFORMIN (GLUCOPHAGE) 1000 MG tablet TAKE 1 TABLET BY MOUTH TWICE A DAY WITH MEALS 180 tablet 3   • Multiple Vitamins-Minerals (PreserVision AREDS 2+Multi Vit) CAPS Take 1 tablet by mouth Daily at 2am     • pantoprazole (PROTONIX) 40 mg tablet TAKE 1 TABLET BY MOUTH EVERY DAY 90 tablet 5   • Potassium Citrate ER 15 MEQ (1620 MG) TBCR Take 15 mEq by mouth 2 (two) times a day     • simvastatin (ZOCOR) 40 mg tablet Take 40 mg by mouth every evening     • zoledronic acid (Reclast) 5 mg/100 mL IV infusion (premix) Every other year.     • citric acid-potassium citrate (POLYCITRA K) 1,100-334 mg/5 mL solution Take 20 mEq by mouth (Patient not taking: Reported on 2/20/2025)     • ondansetron (ZOFRAN) 8 mg tablet TAKE 1 TABLET BY MOUTH THREE TIMES A DAY BY  "MOUTH AS NEEDED FOR NAUSEA FOR 10 DAYS (Patient not taking: Reported on 2/20/2025)       No current facility-administered medications for this visit.     Allergies   Allergen Reactions   • Demerol [Meperidine] GI Intolerance     Other reaction(s): N/V   • Milk-Related Compounds - Food Allergy Sneezing   • Morphine GI Intolerance     Other reaction(s): N/V   • Farxiga [Dapagliflozin] Other (See Comments)     Tongue burning   • Meloxicam Swelling       Objective   Vitals: Blood pressure 120/66, pulse 66, height 5' 5\" (1.651 m), weight 88.9 kg (196 lb), SpO2 96%.    Physical Exam  Vitals and nursing note reviewed.   Constitutional:       General: She is not in acute distress.     Appearance: Normal appearance. She is not diaphoretic.   HENT:      Head: Normocephalic and atraumatic.   Eyes:      General: No scleral icterus.     Extraocular Movements: Extraocular movements intact.      Conjunctiva/sclera: Conjunctivae normal.      Pupils: Pupils are equal, round, and reactive to light.   Neck:      Thyroid: Thyroid mass present. No thyromegaly or thyroid tenderness.   Cardiovascular:      Rate and Rhythm: Normal rate and regular rhythm.      Heart sounds: No murmur heard.  Pulmonary:      Effort: Pulmonary effort is normal. No respiratory distress.      Breath sounds: Normal breath sounds. No wheezing.   Musculoskeletal:      Cervical back: Normal range of motion.      Right lower leg: No edema.      Left lower leg: No edema.   Lymphadenopathy:      Cervical: No cervical adenopathy.   Neurological:      Mental Status: She is alert and oriented to person, place, and time. Mental status is at baseline.      Sensory: No sensory deficit.      Gait: Gait normal.   Psychiatric:         Mood and Affect: Mood normal.         Behavior: Behavior normal.         Thought Content: Thought content normal.         The history was obtained from the review of the chart, patient.    Lab Results:   Lab Results   Component Value Date/Time " "   Hemoglobin A1C 6.4 (H) 10/25/2024 11:52 AM    Hemoglobin A1C 7.2 06/20/2024 12:00 AM    BUN 15 10/25/2024 11:52 AM    Potassium 4.3 10/25/2024 11:52 AM    Chloride 109 (H) 10/25/2024 11:52 AM    CO2 19 (L) 10/25/2024 11:52 AM    Creatinine 0.69 10/25/2024 11:52 AM    AST 12 10/25/2024 11:52 AM    ALT 15 10/25/2024 11:52 AM    Protein, Total 6.4 10/25/2024 11:52 AM    Albumin 4.1 10/25/2024 11:52 AM    Globulin, Total 2.3 10/25/2024 11:52 AM           Imaging Studies: Results Review Statement: No pertinent imaging studies reviewed.    Portions of the record may have been created with voice recognition software. Occasional wrong word or \"sound a like\" substitutions may have occurred due to the inherent limitations of voice recognition software. Read the chart carefully and recognize, using context, where substitutions have occurred.    "

## 2025-02-26 ENCOUNTER — TELEPHONE (OUTPATIENT)
Dept: ENDOCRINOLOGY | Facility: HOSPITAL | Age: 70
End: 2025-02-26

## 2025-02-26 NOTE — TELEPHONE ENCOUNTER
Reviewed recent blood sugar logs.  Average blood sugar over the last 30 days is 136 which is doing well.  I be fine with her restarting Mounjaro as discussed at last office visit when she gets back from vacation.

## 2025-02-27 ENCOUNTER — HOSPITAL ENCOUNTER (OUTPATIENT)
Dept: ULTRASOUND IMAGING | Facility: CLINIC | Age: 70
Discharge: HOME | End: 2025-02-27
Payer: COMMERCIAL

## 2025-02-27 ENCOUNTER — APPOINTMENT (OUTPATIENT)
Dept: RADIOLOGY | Facility: CLINIC | Age: 70
End: 2025-02-27
Payer: COMMERCIAL

## 2025-02-27 DIAGNOSIS — E04.1 THYROID NODULE: ICD-10-CM

## 2025-02-27 DIAGNOSIS — M81.0 SENILE OSTEOPOROSIS: ICD-10-CM

## 2025-02-27 PROCEDURE — 72110 X-RAY EXAM L-2 SPINE 4/>VWS: CPT

## 2025-02-27 PROCEDURE — 76536 US EXAM OF HEAD AND NECK: CPT

## 2025-03-04 ENCOUNTER — RESULTS FOLLOW-UP (OUTPATIENT)
Dept: ENDOCRINOLOGY | Facility: HOSPITAL | Age: 70
End: 2025-03-04

## 2025-03-12 NOTE — TELEPHONE ENCOUNTER
Patient states prep for urodynamic testing procedure in April is to take sulfamethoxazole x 3 days.  Patient states she has been on metformin for years and she read a print out from [employer] that states it can build up in the system while taking sulfamethoxazole and cause a build up of lactic acid,  Asking is this is safe to take. Patient also asking for results of thyroid ultrasound instead of waiting until appointment. Please advise, thank you.

## 2025-03-14 ENCOUNTER — NURSE TRIAGE (OUTPATIENT)
Age: 70
End: 2025-03-14

## 2025-03-14 NOTE — TELEPHONE ENCOUNTER
Patient contacted office with complaints of stomach pain. Patient states she had recent imaging done which showed gallstones. Call transferred to nurse triage to further assist.

## 2025-03-14 NOTE — TELEPHONE ENCOUNTER
"FOLLOW UP: FYI- appt scheduled on 5/13/25.    REASON FOR CONVERSATION: Abdominal Pain    SYMPTOMS: RUQ and sometimes LUQ abd pain, constant 5/10 for months.  Alternating loose stools and constipation    OTHER: Pt states PCP referred her for appt for gallstones.  Only wants to see Dr. Maxwell-last saw Dr. Obrien in 2022.     Pt is taking Fiber Gummies, for the stools.     DISPOSITION: Schedule Office Visit (overriding See Within 2 Weeks in Office)     Reason for Disposition   Abdominal pain is a chronic symptom (recurrent or ongoing AND lasting > 4 weeks)    Answer Assessment - Initial Assessment Questions  1. LOCATION: \"Where does it hurt?\"       RUQ abd - sometimes LUQ also  2. RADIATION: \"Does the pain shoot anywhere else?\" (e.g., chest, back)      Denies  3. ONSET: \"When did the pain begin?\" (e.g., minutes, hours or days ago)       Ongoing for months  4. SUDDEN: \"Gradual or sudden onset?\"      Gradual   5. PATTERN \"Does the pain come and go, or is it constant?\"      constant  6. SEVERITY: \"How bad is the pain?\"  (e.g., Scale 1-10; mild, moderate, or severe)      5  7. RECURRENT SYMPTOM: \"Have you ever had this type of stomach pain before?\" If Yes, ask: \"When was the last time?\" and \"What happened that time?\"       Yes   8. CAUSE: \"What do you think is causing the stomach pain?\"      Gallstones   9. RELIEVING/AGGRAVATING FACTORS: \"What makes it better or worse?\" (e.g., antacids, bending or twisting motion, bowel movement)      None   10. OTHER SYMPTOMS: \"Do you have any other symptoms?\" (e.g., back pain, diarrhea, fever, urination pain, vomiting)        Denies    Protocols used: Abdominal Pain - Female-Adult-[place]    "

## 2025-04-03 ENCOUNTER — TELEPHONE (OUTPATIENT)
Dept: ENDOCRINOLOGY | Facility: HOSPITAL | Age: 70
End: 2025-04-03

## 2025-04-03 ENCOUNTER — OFFICE VISIT (OUTPATIENT)
Dept: ENDOCRINOLOGY | Facility: HOSPITAL | Age: 70
End: 2025-04-03
Payer: COMMERCIAL

## 2025-04-03 VITALS
DIASTOLIC BLOOD PRESSURE: 68 MMHG | WEIGHT: 198.8 LBS | BODY MASS INDEX: 33.12 KG/M2 | SYSTOLIC BLOOD PRESSURE: 120 MMHG | HEIGHT: 65 IN | HEART RATE: 82 BPM | OXYGEN SATURATION: 97 %

## 2025-04-03 DIAGNOSIS — E04.2 NON-TOXIC MULTINODULAR GOITER: ICD-10-CM

## 2025-04-03 DIAGNOSIS — M81.0 AGE-RELATED OSTEOPOROSIS WITHOUT CURRENT PATHOLOGICAL FRACTURE: ICD-10-CM

## 2025-04-03 DIAGNOSIS — E04.1 THYROID NODULE: Primary | ICD-10-CM

## 2025-04-03 PROCEDURE — 99215 OFFICE O/P EST HI 40 MIN: CPT | Performed by: STUDENT IN AN ORGANIZED HEALTH CARE EDUCATION/TRAINING PROGRAM

## 2025-04-03 PROCEDURE — G2211 COMPLEX E/M VISIT ADD ON: HCPCS | Performed by: STUDENT IN AN ORGANIZED HEALTH CARE EDUCATION/TRAINING PROGRAM

## 2025-04-03 NOTE — TELEPHONE ENCOUNTER
*4/2@0827 - [facility] [place] call to let us know why she's coming in at 6 weeks instead of 3 months -     PER DR GRIFFIN - PT REQUIRES 40 MIN APPT WITH DR GRIFFIN (or 30 minutes with GALO Metcalf) - ej    *also sent a visit text message to pt's cell - ej

## 2025-04-03 NOTE — PROGRESS NOTES
Established Patient Progress Note     No chief complaint on file.     History of Present Illness:     Yun Simmons is a 69 y.o. female with a history of T2DM, DESTINEY, obesity, htn, hlp and known thyroid nodule, osteoporosis on reclast- managed by rheum, PKD follows nephrology who was recently seen by Brennen for diabetes management presents today to review her thyroid US. Also would like to transfer care for diabetes in future. Previously seen by brennen Metcalf, this is a transfer of care visit, all previous notes/labs reviewed for the visit.     Patient has known MNG for years. Previously seen by [facility]. Now transferring care to see us.   Patient has known MNG since .   Previously had US through [facility] and then at [facility]. Last US done  shows a 2.5cm TR3 right midgland nodule- stable compared to US in - temple, 1.2 cm TR3 right lower- stable from , 3.5cm TR 3 left midgland- stable since  US. Had FNA of left thyroid nodule in  which was positive for ?follicular neoplasm with bethasda 4- affirma neg.   Patient currently reports:- feeling tired. Denies any dysphagia, odynophagia. Does report some SOB at times but also wears her CPAP. Does report was told she has a parathyroid issue in past d/t labs showing hypercalcemia.   Denies any history of radiation to the neck, denies any history of thyroid cancer.     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     Diabetes mellitus (HCC)     GERD (gastroesophageal reflux disease)     Renal disorder     Sleep apnea       Past Surgical History:   Procedure Laterality Date     SECTION       COLONOSCOPY      RENAL CYST EXCISION      SHOULDER SURGERY      plastic hardware    TONSILLECTOMY      US GUIDED THYROID BIOPSY  2021      Family History   Problem Relation Age of Onset    COPD Mother     Lung cancer Mother     Lung cancer Father     Hypertension Father     No Known Problems Sister     Hypertension Brother     Colon polyps Brother     Kidney disease Brother     Colon polyps Brother     Stroke Brother     Kidney disease Brother     Hypertension Brother     Hyperlipidemia Brother     Gestational diabetes Sister     Colon cancer Maternal Aunt      Social History     Tobacco Use    Smoking status: Former     Current packs/day: 0.00     Average packs/day: 0.5 packs/day for 8.8 years (4.4 ttl pk-yrs)     Types: Cigarettes     Start date:      Quit date: 10/29/1981     Years since quittin.4    Smokeless tobacco: Never   Substance Use Topics    Alcohol use: Never     Allergies   Allergen Reactions    Demerol [Meperidine] GI Intolerance     Other reaction(s): N/V    Milk-Related Compounds - Food Allergy Sneezing    Morphine GI Intolerance     Other reaction(s): N/V    Farxiga [Dapagliflozin] Other (See Comments)     Tongue burning    Meloxicam Swelling       Current Outpatient Medications:     acetaZOLAMIDE (DIAMOX) 125 mg tablet, 125 mg p.o. in a.m. and 250 mg in evening, Disp: 270 tablet, Rfl: 4    Cholecalciferol (Vitamin D3) 50 MCG (2000) capsule, Take 2,000 Units by mouth every other day, Disp: , Rfl:     citric acid-potassium citrate (POLYCITRA K) 1,100-334 mg/5 mL solution, Take 20 mEq by mouth (Patient not taking: Reported on 2025), Disp: , Rfl:     clopidogrel (PLAVIX) 75 mg tablet, Take 75 mg by mouth daily, Disp: , Rfl:     famotidine (PEPCID) 40 MG tablet, Take 40 mg by mouth Daily at 2am, Disp: , Rfl:     fluticasone (FLONASE) 50 mcg/act nasal spray, 1 spray into each nostril daily (Patient taking differently: 1 spray into each nostril as needed), Disp: , Rfl:      halobetasol (ULTRAVATE) 0.05 % ointment, Apply topically if needed, Disp: , Rfl:     losartan (COZAAR) 50 mg tablet, TAKE 1 TABLET BY MOUTH EVERY DAY, Disp: 90 tablet, Rfl: 1    metFORMIN (GLUCOPHAGE) 1000 MG tablet, TAKE 1 TABLET BY MOUTH TWICE A DAY WITH MEALS, Disp: 180 tablet, Rfl: 3    Multiple Vitamins-Minerals (PreserVision AREDS 2+Multi Vit) CAPS, Take 1 tablet by mouth Daily at 2am, Disp: , Rfl:     ondansetron (ZOFRAN) 8 mg tablet, TAKE 1 TABLET BY MOUTH THREE TIMES A DAY BY MOUTH AS NEEDED FOR NAUSEA FOR 10 DAYS (Patient not taking: Reported on 2/20/2025), Disp: , Rfl:     pantoprazole (PROTONIX) 40 mg tablet, TAKE 1 TABLET BY MOUTH EVERY DAY, Disp: 90 tablet, Rfl: 5    Potassium Citrate ER 15 MEQ (1620 MG) TBCR, Take 15 mEq by mouth 2 (two) times a day, Disp: , Rfl:     simvastatin (ZOCOR) 40 mg tablet, Take 40 mg by mouth every evening, Disp: , Rfl:     zoledronic acid (Reclast) 5 mg/100 mL IV infusion (premix), Every other year., Disp: , Rfl:     Review of Systems   Constitutional:  Negative for fatigue and unexpected weight change.   HENT:  Negative for trouble swallowing and voice change.    Eyes:  Negative for photophobia and visual disturbance.   Respiratory:  Negative for choking and shortness of breath.    Gastrointestinal:  Negative for constipation and diarrhea.   Endocrine: Negative for cold intolerance and heat intolerance.   Musculoskeletal:  Negative for arthralgias and myalgias.   Skin:  Negative for rash.       Physical Exam:  There is no height or weight on file to calculate BMI.  There were no vitals taken for this visit.   Wt Readings from Last 3 Encounters:   02/20/25 88.9 kg (196 lb)   12/11/24 88.3 kg (194 lb 9.6 oz)   11/08/24 90 kg (198 lb 6.4 oz)       Physical Exam  Constitutional:       Appearance: Normal appearance. She is obese.   Cardiovascular:      Rate and Rhythm: Normal rate and regular rhythm.      Pulses: Normal pulses.   Pulmonary:      Effort: Pulmonary effort is  normal.   Abdominal:      General: Abdomen is flat.      Palpations: Abdomen is soft.   Musculoskeletal:      Cervical back: Normal range of motion and neck supple.   Skin:     Capillary Refill: Capillary refill takes less than 2 seconds.   Neurological:      General: No focal deficit present.      Mental Status: She is alert and oriented to person, place, and time.   Psychiatric:         Mood and Affect: Mood normal.       Labs:    Latest Reference Range & Units 11/13/19 09:24 05/08/20 10:13 11/13/20 08:31 02/23/21 08:19 05/14/21 09:18 09/22/21 10:52   TSH, POC 0.450 - 4.500 uIU/mL 1.810 1.490 2.530 2.320 1.180 1.400       THYROID ULTRASOUND     INDICATION: E04.1: Nontoxic single thyroid nodule.     COMPARISON: Thyroid ultrasound 3/31/2022 and 8/11/2021     TECHNIQUE: Ultrasound of the thyroid was performed with a high frequency linear transducer in transverse and sagittal planes including volumetric imaging sweeps as well as traditional still imaging technique.     FINDINGS:  Thyroid texture: Thyroid parenchyma is diffusely heterogeneous in echotexture.     Right lobe: 4.6 x 1.9 x 2.2 cm. Volume 9.3 mL  Left lobe: 5.5 x 2.6 x 2.5 cm. Volume 17.1 mL  Isthmus: 0.6 cm.     Nodule #1. Image 18.  RIGHT midgland nodule measuring 2.6 x 1.8 x 2.1 cm. Mildly increased size due to new small benign cystic component. (Unchanged from US in 2023 from temple)   COMPOSITION: 2 points, solid or almost completely solid. New small cystic component.  ECHOGENICITY: 1 point, hyperechoic or isoechoic.  SHAPE: 0 points, wider-than-tall.  MARGIN: 0 points, smooth.  ECHOGENIC FOCI: 0 points, none or large comet-tail artifacts.  TI-RADS Classification: TR 3 (3 points), FNA if >/= 2.5 cm. Follow if >/= 1.5 cm.     Nodule #2. Image 24.  RIGHT lower pole nodule measuring 0.8 x 1.2 x 1.2 cm. Given differences in measuring technique, no significant change from prior. I have remeasured this nodule on the transverse cine images.  COMPOSITION: 2  points, solid or almost completely solid.  ECHOGENICITY: 1 point, hyperechoic or isoechoic.  SHAPE: 0 points, wider-than-tall.  MARGIN: 0 points, ill-defined.  ECHOGENIC FOCI: 0 points, none or large comet-tail artifacts.  TI-RADS Classification: TR 3 (3 points), FNA if >/= 2.5 cm. Follow if >/= 1.5 cm.     Nodule #3. Image 55.  LEFT midgland nodule measuring 3.5 x 2.5 x 2.2 cm cm. Given differences in measuring technique, no significant change from prior. (Unchanged from 2023)  COMPOSITION: 2 points, solid or almost completely solid.  ECHOGENICITY: 1 point, hyperechoic or isoechoic. Heterogeneous echogenicity.  SHAPE: 0 points, wider-than-tall.  MARGIN: 0 points, smooth.  ECHOGENIC FOCI: 0 points, none or large comet-tail artifacts.  TI-RADS Classification: TR 3 (3 points), FNA if >/= 2.5 cm. Follow if >/= 1.5 cm.  This nodule has had a previous benign biopsy.  As it is stable, no further sampling recommended at this time.  Further surveillance at 2 year intervals could be considered   to confirm continued stability for a period of greater than 5 years.     IMPRESSION:     Multinodular thyroid gland with previous non-malignant biopsy results as above. Although the 2.6 cm right TR 3 nodule now meets size criteria for biopsy, the minimal increase in size is is attributed to new small benign cystic component. As such,   continued surveillance is appropriate. Based on 2015 VAN guidelines, additional follow-up ultrasound should be performed in 12 to 24 months.      2023  US THYROID  Order: [id number]  Impression    IMPRESSION:    Stable thyroid nodules as detailed.      RECOMMENDATIONS:  TI-RADS 1 and 2:     Ultrasound surveillance and biopsy are not necessary.  TI-RADS 3:     Ultrasound surveillance at 1, 3, and 5 years for nodules between 1.5 and 2.5 cm.     Biopsy is recommended for nodules 2.5 cm or greater.  TI-RADS 4:     Ultrasound surveillance at 1, 2, 3, and 5 years for nodules between 1 and 1.5 cm.     Biopsy  is recommended for 1.5 cm or greater.  TI-RADS 5:     Annual ultrasound for up to 5 years for nodules between 0.5 and 1 cm.     Biopsy is recommended for nodules 1 cm or greater.    Imaging surveillance can stop at 5 years if there is no change in size. Recommendations may differ based on biopsy results, patient factors and other comorbidities.    CITATION:  Kael FN, Shahana WD, Taj EG, et al. ACR Thyroid Imaging, Reporting and Data System (TI-RADS): White Paper of the ACR TI-RADS Committee. J Am Teresa Radiol 2017;14:587-595.  Narrative    THYROID ULTRASOUND    INDICATION: Thyroid nodules    COMPARISON: Ultrasound dated 5/11/2023    TECHNIQUE: Ultrasound of the thyroid gland was performed. Color Doppler was utilized.    FINDINGS:    The RIGHT thyroid lobe measures 4.9 x 2.1 x 2.2 cm.  The LEFT thyroid lobe measures 5.2 x 2.2 x 2.4 cm.  The thyroid isthmus measures 0.5 cm.  The thyroid gland is homogenous in echotexture and normal in vascularity.    Thyroid nodules:    Right -  #N1: Solid isoechoic right thyroid nodule measuring 2.6 x 2 x 1.7 cm, previously 2.4 x 2 x 1.5 cm,, probably not changed given differences in technique. This again demonstrates some cystic component. TR 3.    Left -  #N1: Left thyroid predominantly solid isoechoic nodule with some cystic change measuring 3.4 x 2.2 x 2 cm, previously 3.5 x 2.2 x 2 cm. TR 3.    Previously seen possible right parathyroid nodule was not identified here.        Impression & Plan:    Problem List Items Addressed This Visit    None    No orders of the defined types were placed in this encounter.      There are no Patient Instructions on file for this visit.    Patient is a 69yF with T2DM, htn, hlp, DESTINEY and known non toxic MNG who presents today to discuss her US    1) Non toxic MNG:- Reviewed patients previous US from 2022 and 2023. Does not have any compressive symptoms and low risk for malignancy otherwise. Reviewed that all her nodules are currently stable  and hence can continue to follow up with US every year. Will check TFT now since she does report some fatigue and also diarrhea    2) Diabetes:- patients last A1C was 7.2%, currently only on metformin. Talks about mounjaro in future. Has labs ordered for next visit. Will have a visit for this in 3 months. Follow up with me then     3) Hyperparathyroidism?:- reviewed her past labs with high calcium but low normal PTH which makes her hypercalcemia not PTH related. Most recent calcium is normal. Will follow along on CMP in next visit and see if this needs more workup.     4) Osteoporosis- follows with rheum for this, c/w reclast. DXA through [facility]    Discussed with the patient and all questioned fully answered. She will call me if any problems arise.    Follow-up appointment in 3 months.     Counseled patient on diagnostic results, prognosis, risk and benefit of treatment options, instruction for management, importance of treatment compliance, Risk  factor reduction and impressions    I have spent a total time of 55 minutes in caring for this patient on the day of the visit/encounter including Documenting in the medical record and Obtaining or reviewing history  .     Danna Chamberlain MD

## 2025-04-16 ENCOUNTER — RESULTS FOLLOW-UP (OUTPATIENT)
Dept: ENDOCRINOLOGY | Facility: HOSPITAL | Age: 70
End: 2025-04-16

## 2025-04-16 LAB
T4 FREE SERPL-MCNC: 1.09 NG/DL (ref 0.82–1.77)
TSH SERPL DL<=0.005 MIU/L-ACNC: 1.6 UIU/ML (ref 0.45–4.5)

## 2025-05-13 ENCOUNTER — OFFICE VISIT (OUTPATIENT)
Dept: GASTROENTEROLOGY | Facility: CLINIC | Age: 70
End: 2025-05-13
Payer: COMMERCIAL

## 2025-05-13 ENCOUNTER — TELEPHONE (OUTPATIENT)
Dept: GASTROENTEROLOGY | Facility: CLINIC | Age: 70
End: 2025-05-13

## 2025-05-13 VITALS
WEIGHT: 198.6 LBS | BODY MASS INDEX: 33.09 KG/M2 | DIASTOLIC BLOOD PRESSURE: 68 MMHG | SYSTOLIC BLOOD PRESSURE: 138 MMHG | HEIGHT: 65 IN

## 2025-05-13 DIAGNOSIS — Z86.0101 PERSONAL HISTORY OF ADENOMATOUS AND SERRATED COLON POLYPS: ICD-10-CM

## 2025-05-13 DIAGNOSIS — R13.19 ESOPHAGEAL DYSPHAGIA: ICD-10-CM

## 2025-05-13 DIAGNOSIS — G45.9 TIA (TRANSIENT ISCHEMIC ATTACK): ICD-10-CM

## 2025-05-13 DIAGNOSIS — K80.20 GALLSTONES: ICD-10-CM

## 2025-05-13 DIAGNOSIS — R10.11 RIGHT UPPER QUADRANT ABDOMINAL PAIN: Primary | ICD-10-CM

## 2025-05-13 DIAGNOSIS — R10.13 EPIGASTRIC ABDOMINAL PAIN: ICD-10-CM

## 2025-05-13 DIAGNOSIS — E66.01 MORBID OBESITY (HCC): ICD-10-CM

## 2025-05-13 DIAGNOSIS — K83.8 DILATED CBD, ACQUIRED: ICD-10-CM

## 2025-05-13 PROCEDURE — G2211 COMPLEX E/M VISIT ADD ON: HCPCS | Performed by: INTERNAL MEDICINE

## 2025-05-13 PROCEDURE — 99214 OFFICE O/P EST MOD 30 MIN: CPT | Performed by: INTERNAL MEDICINE

## 2025-05-13 RX ORDER — MIRABEGRON 50 MG/1
1 TABLET, FILM COATED, EXTENDED RELEASE ORAL DAILY
COMMUNITY
Start: 2025-05-08

## 2025-05-13 RX ORDER — SODIUM CHLORIDE, SODIUM LACTATE, POTASSIUM CHLORIDE, CALCIUM CHLORIDE 600; 310; 30; 20 MG/100ML; MG/100ML; MG/100ML; MG/100ML
125 INJECTION, SOLUTION INTRAVENOUS CONTINUOUS
OUTPATIENT
Start: 2025-05-13

## 2025-05-13 NOTE — PROGRESS NOTES
Name: Yun Simmons      : 1955      MRN: [id number]  Encounter Provider: Roman Maxwell DO  Encounter Date: 2025   Encounter department: [facility]  :  Assessment & Plan  Right upper quadrant abdominal pain  Intermittent right upper quadrant pain with radiation to the epigastrium, occurs every 2 to 3 weeks.  No specific trigger foods.  Ultrasound shows cholelithiasis with a mildly dilated CBD.     Differential mainly includes biliary colic, choledocholithiasis, or gastritis/peptic ulcer disease.  Peptic ulcer disease less likely with chronic PPI use.     Discussed workup options.  Will check LFTs and MRCP due to dilated CBD.  If choledocholithiasis is demonstrated we will plan ERCP.  due to longstanding reflux complaints and intermittent solid food dysphagia we will also plan upper endoscopy.  If the above workup is unrevealing we will refer to surgery for cholecystectomy.       Gallstones  Ultrasound shows cholelithiasis without pericholecystic fluid or gallbladder wall thickening.  Technologist reported a sonographic Manuel sign.  Plan workup as above, if no other etiology is identified will refer to surgery for cholecystectomy       Dilated cbd, acquired  10 mm duct on ultrasound.  Will check LFTs and an MRCP to assess for choledocholithiasis  Orders:    Hepatic function panel    MRI abdomen w wo contrast and mrcp; Future    Esophageal dysphagia  Intermittent dysphagia to solid foods and potassium pills, will assess further on upcoming EGD  Orders:    EGD; Future    Epigastric abdominal pain  Episodic RUQ/epigastric pain, biliary versus gastritis/peptic ulcer disease.  Plan EGD  Orders:    EGD; Future    Personal history of adenomatous and serrated colon polyps  Adenoma 2021, 5-year recall recommended       Morbid obesity (HCC)  Due to start Mounjaro.  If she starts this before the upper endoscopy I reviewed that it will need to be held 7 days  preprocedure per current anesthesia guidelines.       TIA (transient ischemic attack)  Maintained on Plavix.  Hold 5 days preprocedure pending approval from her neurologist         History of Present Illness   Yun Simmons is a 69 y.o. female who presents for evaluation of abdominal pain.  I last saw her in the office in 2022 for diarrhea that was controlled on dicyclomine.  She now takes fiber daily and no longer requires dicyclomine.    In January or February 2025 she began to experience episodic right upper quadrant pain associated with nausea.  Symptoms occur every week or 2.  There are no specific trigger foods.  She generally does not eat fatty foods.  It is associated with nausea but no vomiting.  At symptom onset the pain is severe and then fades within 30 minutes.  Workup thus far includes an ultrasound that shows gallstones and there is a sonographic Manuel sign.  There is also a 10 mm common bile duct.    Symptoms do not occur every day.  She has had decreased appetite over the past few months.  There is no vomiting.  She denies any constipation or straining..  She denies any NSAIDs        HPI  History obtained from: patient  Review of Systems A complete review of systems is negative other than that noted above in the HPI.    Medical History Reviewed by provider this encounter:     .  Current Outpatient Medications   Medication Sig Dispense Refill    acetaZOLAMIDE (DIAMOX) 125 mg tablet 125 mg p.o. in a.m. and 250 mg in evening (Patient taking differently: 125 mg p.o. in a.m. and 125 mg in evening) 270 tablet 4    Cholecalciferol (Vitamin D3) 50 MCG (2000 UT) capsule Take 2,000 Units by mouth every other day      clopidogrel (PLAVIX) 75 mg tablet Take 75 mg by mouth daily      famotidine (PEPCID) 40 MG tablet Take 40 mg by mouth Daily at 2am (Patient taking differently: Take 40 mg by mouth daily at bedtime)      fluticasone (FLONASE) 50 mcg/act nasal spray 1 spray into each nostril daily       "halobetasol (ULTRAVATE) 0.05 % ointment Apply topically if needed      losartan (COZAAR) 50 mg tablet TAKE 1 TABLET BY MOUTH EVERY DAY 90 tablet 1    metFORMIN (GLUCOPHAGE) 1000 MG tablet TAKE 1 TABLET BY MOUTH TWICE A DAY WITH MEALS 180 tablet 3    Multiple Vitamins-Minerals (PreserVision AREDS 2+Multi Vit) CAPS Take 1 tablet by mouth Daily at 2am      Myrbetriq 50 MG TB24 Take 1 tablet by mouth in the morning      pantoprazole (PROTONIX) 40 mg tablet TAKE 1 TABLET BY MOUTH EVERY DAY 90 tablet 5    Potassium Citrate ER 15 MEQ (1620 MG) TBCR Take 15 mEq by mouth 2 (two) times a day      simvastatin (ZOCOR) 40 mg tablet Take 40 mg by mouth every evening      zoledronic acid (Reclast) 5 mg/100 mL IV infusion (premix) Every other year.      citric acid-potassium citrate (POLYCITRA K) 1,100-334 mg/5 mL solution Take 20 mEq by mouth (Patient not taking: Reported on 2/20/2025)      ondansetron (ZOFRAN) 8 mg tablet TAKE 1 TABLET BY MOUTH THREE TIMES A DAY BY MOUTH AS NEEDED FOR NAUSEA FOR 10 DAYS (Patient not taking: Reported on 2/20/2025)       No current facility-administered medications for this visit.     Objective   /68   Ht 5' 5\" (1.651 m)   Wt 90.1 kg (198 lb 9.6 oz)   BMI 33.05 kg/m²     Physical Exam       Lab Results: I personally reviewed relevant lab results. CBC/BMP: No new results in last 24 hours.     Radiology Results Review: I have reviewed radiology reports from [facility] including: Ultrasound(s).  Results for orders placed during the hospital encounter of 07/01/20    EGD    Narrative  [facility]  [address] [address] [phone]      DATE OF SERVICE:  7/01/20    PHYSICIAN(S):  - Attending Physician      INDICATION:  Chest pain, atypical, Gastroesophageal reflux disease with esophagitis    POST-OP DIAGNOSIS:  See the impression below.    PREPROCEDURE:  Informed consent was obtained for the procedure, including sedation.  Risks of " perforation, hemorrhage, adverse drug reaction and aspiration were discussed. The patient was placed in the left lateral decubitus position.    Patient was explained about the risks and benefits of the procedure. Risks including but not limited to bleeding, infection, and perforation were explained in detail. Also explained about less than 100% sensitivity with the exam and other alternatives.    DETAILS OF PROCEDURE:  The patient underwent deep sedation, which was administered by an anesthesia professional. The patient's blood pressure, heart rate, level of consciousness, respirations and oxygen were monitored throughout the procedure. The scope was introducedthrough the mouth and advanced to the second part of the duodenum. Retroflexion was performed in the fundus. Prior to the procedure, the patient's H. Pylori status was unknown. The patient experienced no blood loss. The procedure was not difficult. Thepatient tolerated the procedure well. There were no apparent complications.  Noted should be noted that the patient did have a difficult airway and did have some desaturation which responded to nasal airway supplemental oxygen and chin lifting.  Full exam was performed but expedited given the situation.    ANESTHESIA INFORMATION:  ASA: III  Anesthesia Type: Anesthesia type not filed in the log.    FINDINGS:  All observed locations appeared normal, including the esophagus  The esophagus appeared normal. No stricture observed in the esophagus or significant dilation  Mild, patchy erythematous mucosa in the antrum  Five benign-appearing polyps measuring from 2 mm up to 3 mm  The duodenum appeared normal. First and 2nd portion      SPECIMENS:  * No specimens in log *    IMPRESSION  NORMAL ESOPHAGUS WITHOUT STRICTURE OR MUCOSAL ABNORMALITY  MINIMAL ANTRAL GASTRITIS  SEVERAL SMALL HYPERPLASTIC APPEARING POLYPS BODY SYSTEMS      RECOMMENDATION:  Continue present medications  Follow-up with   in the office as  scheduled

## 2025-05-13 NOTE — TELEPHONE ENCOUNTER
Scheduled date of EGD(as of today): 7-8-25  Physician performing EGD: Alissa  Location of EGD: [facility]  Instructions reviewed with patient by: Kaylee from CV  Clearances: Yes, Plavix to be obtained from Dr. Tiffany Magallanes-[facility]

## 2025-05-13 NOTE — ASSESSMENT & PLAN NOTE
Due to start Mounjaro.  If she starts this before the upper endoscopy I reviewed that it will need to be held 7 days preprocedure per current anesthesia guidelines.

## 2025-05-13 NOTE — PATIENT INSTRUCTIONS
Will obtain liver test and an MRI/MRCP to assess the importance of the dilated bile duct.  If a stone is identified in the bile duct we will arrange a scope procedure called an ERCP.    Will plan upper endoscopy to assess for inflammation in the esophagus and stomach as a source of the pain.  Will also assess the intermittent swallowing difficulty    If the above workup is unrevealing will refer to general surgery for gallbladder removal.      Scheduled date of EGD(as of today):7/8/25  Physician performing EGD: Dr Maxwell  Location of EGD: [facility]  Instructions reviewed with patient by: Saundra  Clearances: Plavix    The patient will call to schedule MRI

## 2025-05-22 ENCOUNTER — RESULTS FOLLOW-UP (OUTPATIENT)
Dept: GASTROENTEROLOGY | Facility: CLINIC | Age: 70
End: 2025-05-22

## 2025-05-22 LAB
ALBUMIN SERPL-MCNC: 4.4 G/DL (ref 3.9–4.9)
ALP SERPL-CCNC: 96 IU/L (ref 44–121)
ALT SERPL-CCNC: 18 IU/L (ref 0–32)
AST SERPL-CCNC: 15 IU/L (ref 0–40)
BILIRUB DIRECT SERPL-MCNC: 0.22 MG/DL (ref 0–0.4)
BILIRUB SERPL-MCNC: 0.5 MG/DL (ref 0–1.2)
LEFT EYE DIABETIC RETINOPATHY: NORMAL
PROT SERPL-MCNC: 7 G/DL (ref 6–8.5)
RIGHT EYE DIABETIC RETINOPATHY: NORMAL

## 2025-06-04 ENCOUNTER — OFFICE VISIT (OUTPATIENT)
Dept: NEUROLOGY | Facility: CLINIC | Age: 70
End: 2025-06-04
Payer: COMMERCIAL

## 2025-06-04 VITALS
TEMPERATURE: 97.8 F | DIASTOLIC BLOOD PRESSURE: 70 MMHG | HEIGHT: 65 IN | BODY MASS INDEX: 32.4 KG/M2 | WEIGHT: 194.5 LBS | HEART RATE: 71 BPM | SYSTOLIC BLOOD PRESSURE: 130 MMHG | RESPIRATION RATE: 18 BRPM | OXYGEN SATURATION: 96 %

## 2025-06-04 DIAGNOSIS — G43.009 MIGRAINE WITHOUT AURA AND WITHOUT STATUS MIGRAINOSUS, NOT INTRACTABLE: Primary | ICD-10-CM

## 2025-06-04 DIAGNOSIS — G47.33 OSA ON CPAP: ICD-10-CM

## 2025-06-04 PROCEDURE — 99214 OFFICE O/P EST MOD 30 MIN: CPT | Performed by: PSYCHIATRY & NEUROLOGY

## 2025-06-04 PROCEDURE — G2211 COMPLEX E/M VISIT ADD ON: HCPCS | Performed by: PSYCHIATRY & NEUROLOGY

## 2025-06-04 RX ORDER — ACETAZOLAMIDE 125 MG/1
TABLET ORAL
Qty: 360 TABLET | Refills: 4 | Status: SHIPPED | OUTPATIENT
Start: 2025-06-04

## 2025-06-04 NOTE — PROGRESS NOTES
Neurology Ambulatory Visit  Name: Yun Simmons       : 1955       MRN: [id number]   Encounter Provider: Tiffany Magallanes MD   Encounter Date: 2025  Encounter department: [facility]      :  Assessment & Plan  Migraine without aura and without status migrainosus, not intractable    Assessment/Plan:   Yun Simmons is a delightful 69 y.o. female with a past medical history that includes hypercalcemia, type 2 diabetes mellitus, thyroid versus parathyroid nodule, DESTINEY on CPAP (), hypertension, postural hypotension, normocytic anemia, hyperlipidemia,  headaches, BMI over 30, visual disturbance, autosomal dominant polycystic kidney disease*, kidney stone, s/p shoulder surgery referred here for evaluation of headache.  My initial evaluation 2023     Migraine without aura and without status migrainosus, not intractable  Visual disturbance-follows with 2 eye specialists  Features of idiopathic intracranial hypertension on imaging and history without papilledema not meeting criteria for IIH  DESTINEY on CPAP (REM 47)  -following with sleep medicine  She reports a long history of headaches and migraines dating back to her teens and early 20s that seemed to significantly improve following diagnosis and treatment of DESTINEY in the .  Initially, she felt that was the last time she had headaches, but in recollection she recalls she did follow with a neurologist in [place] in the early  and did round round of Botox for chronic migraine.  She believes migraines may have improved following menopause.  On 3/22/2023, she woke up with a headache and was playing cards when she noticed unilateral peripheral vision loss on the left, unclear if she woke up with that as well, but it persisted and she went to the emergency department where she was admitted for stroke evaluation and found not to have had a stroke.  MRI brain was unremarkable for acute pathology, as was CTA head and neck,  carotid ultrasound, TTE, and she was already on aspirin which was continued as well as risk factor control.  She followed up with eye doctor last month and no pertinent issues were found except for early macular degeneration.  She reports the pain was right temporal and therefore ESR and CRP were also obtained which were only slightly elevated and temporal ultrasound was unremarkable as well.  -As of 5/2/2023: She reports no further migraines, no further vision changes.  We discussed most likely etiology could be either suboptimally treated DESTINEY contributing to exacerbation of migraine or related to the hypercalcemia.  She mentions some mild memory issues which are not atypical for age and MOCA today was normal.  - as of 6/29/2023: She returns sooner than expected after an incident on 6/6/2023 as detailed in HPI where she had left hand paresthesias that radiate up to the left neck and then down the left side of the body lasting approximately 20 minutes not associated with headache.  She was admitted to the hospital for MRI brain and MRA head were unremarkable for acute pathology and we discussed for my review she does appear to have subtle signs of idiopathic intracranial hypertension and I suggest trial of acetazolamide/Diamox to see if this is the cause of the visual issues she has been following with 2 eye doctors for despite no papilledema.  She is using her CPAP and AHI is less than 1 without leak.  Following up with nephrology for hypercalcemia.  We discussed that I am not convinced the episode was a TIA, but since the inpatient neurologist recommended transition to Plavix, I would not disagree with their recommendations out of precaution.    - as of 11/7/2023: She reports doing very well on acetazolamide/Diamox 250 mg twice daily with only 1 headache, if that, in a month that is not that severe and more of a pressure sensation that acetaminophen helps.  She continues to use her CPAP anytime she is sleeping and  we will try and find the old sleep records as she wonders what her AHI was.  She is following closely with ophthalmology every 6 months to different specialists and no papilledema noted.  No return of the symptoms that admitted her in June.    - as of 3/18/2024: She reports headaches have resolved on acetazolamide 125 mg twice daily which she decreased from 250 mg twice daily around November per PCP recommendations due to chloride being elevated by 1 number at 107 and we discussed certainly if needed could increase the medication again although would recommend every 4 hours rather than just twice a day as I suspect she may have wearing off midday.  Recommended resuming PreserVision through eye doctor for macular degeneration which she recently discontinued and she is having more floaters, follows closely with two eye doctors.  She also stopped iron recently which we discussed could have a negative impact on sleep.  Otherwise she is doing excellent using her CPAP every night all night.  - as of 7/30/2024: She reports no headaches or migraines on acetazolamide 125 mg in a.m. and 250 mg in p.m. with no bothersome side effects.  Recent labs reviewed and follow-up CMP scheduled through other providers although such a tiny dose unlikely to have findings that would make as have to discontinue.  Following with nephrology closely and discussed her overall health as well today.  She is treating sleep apnea with CPAP all night which tends to be around 6 hours and wakes around 4 AM and we discussed ideally if she could fall back asleep at 4 AM it could help with her fatigue that started after she began Mounjaro injections 3 weeks ago.   - as of 12/11/2024: She reports that she is doing wonderfully on acetazolamide/Diamox 125/250 mg without any significant headaches or migraines.  She is treating her sleep apnea all night every night and cannot sleep without it which is wonderful.  She is taking her potassium through nephrology  and has not had a kidney stone in years, but is looking for a smaller pill that would be easier to swallow.  We also discussed her acetazolamide dosing it sounds like she could be having some wearing off effect between doses and I recommend considering 125 mg at 10 AM, 2 PM, 6 PM and if needed could take 1 at 10 PM and overnight if needed for wearing off but will try and keep at the same dose for now at 375 mg in a day.   - as of 6/4/2025: She reports she is not having migraines at all on acetazolamide 125 mg BID and treating her sleep apnea every night. She does not recall why she decreased it from 125/250mg and we discussed she can absolutely continue to take it like this, it is a tiny dose though and since she is waking up with milder headaches the past 2-3 weeks, she could trial taking 125 mg every 4 hours for 4 doses in a day to see if that helps as that is also a very small dose and could help. Not worth switching to long acting capsules as those automatically would bring her to [id number] mg a day. She is also happy to report 10 pound weight loss, appetite is down on mounjaro and making healthier choices when she eats. Following with an excellent urinary specialist, myrbetriq has helped and has a procedure planned that he thinks will help as well.     Workup:  - MOCA normal 27/30 (-2 recall, -1 repeat sentence)   -MRI brain pituitary with and without contrast 3/22/2023: 1.  Unremarkable exam of the pituitary.  No evidence of lesion.  2.  Mild microangiopathic change.  No evidence of acute infarct, intracranial hemorrhage or mass.  -Carotid ultrasound 3/22/2023: There is no evidence of giant cell arteritis, aneurysm, or significant stenosis  noted in the superficial temporal artery and its branches  -CTA head and neck with and without contrast 3/22/2023: No significant stenosis of the cervical carotid or vertebral arteries.  No significant intracranial stenosis, large vessel occlusion or aneurysm.  - MRI brain  without contrast 6/7/2023: 1. No acute infarction.   2. Mild chronic microvascular changes. Remote white matter change just   lateral to the atrium of left lateral ventricle.   3. Tiny susceptible focus, left temporal lobe, as described (It likely reflects a remote petechial hemorrhage or  small vascular calcification). Neither remote   petechial hemorrhage or small vascular calcification.   4. No mass effect or midline shift.   *Above per radiology, as of my retrospective review 11/7/2023 we discussed she has a partially empty sella but nearly completely empty, right greater than left optic nerve sheath prominence with slight tortuosity, ventricles are not extremely tight although if so a little bit on the right, cerebellar tonsils overlie the foramen magnum without Chiari   - MRA brain 6/7/2023: Evaluation of the petrous, cavernous and supraclinoid portions of the distal internal carotid arteries as well as the anterior cerebral and middle   cerebral arteries is unremarkable without evidence of aneurysm, stenosis or   major vessel occlusion.     -MRA neck and carotid ultrasound 6/7/2023: negative    Preventative:  - we discussed headache hygiene and lifestyle factors that may improve headaches  -     acetaZOLAMIDE (DIAMOX) 125 mg tablet; 125 mg p.o. in a.m. and can take every 4 hours while awake for 4 doses or if not tolerated can continue taking as you are 125 mg BID. With plenty of room to adjust up or down as needed/tolerated but will continue here for now at this tiny dose, as she is doing well.  Discussed proper use, off label use with certain meds, possible side effects and risks.  - Currently on through other providers: Losartan/Cozaar 50 mg daily, vitamin D 2000 units every other day,  switched to plavix in June 2023, potassium through nephology 15 mEq BID prior to diamox (does not like the large pill though), PreserVision for her eyes, potassium citrate through nephrology- she hated it due to size but  taking 15 mEq BID, Myrbetrip 15 mEq BID, monjaro for 2 weeks - holding off now prior to procedures 6/9/25  - Past/ failed/contraindicated: Vasotec, gabapentin 100 mg   - future options: Topiramate although with caution due to history of kidney stones, verapamil, CGRP med, botox    Rescue:  - recommend not taking over-the-counter or prescription pain medications more than 3 days per week to prevent medication overuse/rebound headache  - Prescription abortive not indicated at this time, we discussed most likely thing to help for prolonged episode in the future would be steroids if nephrology would be agreeable for short course - she will ask, but PCP has prescribed   - Currently on through other providers: OTC meds  - Past/ failed/contraindicated: Fioricet  -Past/tolerated: PCP gave her short dose of steroids for her current back pain, heel issue, TMJ  - future options:  NO NSAIDS due to PLAVIX Triptan if no contraindication, prochlorperazine,, could consider trial of 5 days of Depakote 500 mg nightly or dexamethasone 2 mg daily for prolonged migraine, ubrelvy, reyvow, nurtec  *Would use the lowest dose of steroids if needed    Patient instructions        Glad that you already have labs pending through other providers otherwise I typically will check labs when we make adjustments    Continue to follow regularly with eye doctor    Glad that your nephrologist has ruled out primary hyperparathyroidism, But that they continue to follow the thyroid nodule yearly since it is a large.    Discuss with nephrology whether they would be ok with 1-5 days of dexamethasone/Decadron 2 to 4 mg -  if needed to break this in the future.   -Glad to hear that you have tolerated the low-dose of prednisone your PCP has prescribed I just wanted to be extra cautious and not go against any nephrology recommendations    Agree with following up with ENT for post nasal drip and if they think it is appropriate could consider ipratropium bromide  nasal spray      I recommend you see one of the following providers in our office:  Jose Quintana PA-C      If your ferritin comes back under 80 I recommend:  Ferritin should be above 80 (some sources say 100) as ferritin less than this may contribute to a lot of issues including headaches, migraines, fatigue, weakness, dizziness, pale skin and brittle nails, shortness of breath, heart palpitations, brain fog, mental fatigue, difficulty focusing, increased susceptibility to infections, cold intolerance, hair loss/telogen effluvium, insomnia/RLS etc. and I recommend considering iron supplementation.      Typically people buy over-the-counter ferrous sulfate which contains 65 mg of elemental iron, I have seen sleep medicine recommend Vitron C which is ferrous fumarate one of the better absorbed iron forms which can be gentler on the stomach and help with absorption compared to ferrous sulfate at times.  It also contains 65 mg of elemental iron +125 mg of vitamin C which boost absorption and has lower risk of constipation compared to ferrous sulfate and less GI discomfort for many people.  Ideally should be taking on an empty stomach for best absorption, but can take with food if stomach irritation occurs.  Many patients take before bed for best tolerance.  There are other types of over-the-counter iron that I am not familiar with, I wish I had time to look into and research all the different types, but certainly whichever form is best tolerated for you.  If not tolerating this oral supplementation, or if iron comes back in the single digits, please discuss with PCP (or hematology if you already follow with them) whether they think iron infusions would be indicated as this is not my area of expertise.         Headache/migraine treatment:   Rescue medications (for immediate  "treatment of a headache):   It is ok to take acetaminophen  if they help your headaches you should limit these to No more than 3 times a week to avoid medication overuse/rebound headaches.     \"exedrin tension\" - caffeine/tylenol       Prescription preventive medications for headaches/migraines   (to take every day to help prevent headaches - not to take at the time of headache):  [x]     -     acetaZOLAMIDE (DIAMOX) 125 mg around 10 AM, 2 PM and 6 PM and absolutely if needed could add a 10 PM dose or could even consider a long-acting dose overnight if ever needed, but we will try and keep you on the lowest dose we need to    If ever needed we could also have you take the 125 at 10 AM 2 PM and continue the 250 mg at 6 PM      Certainly if you happen to awaken at night you absolutely could take 125 mg or 250 mg overnight as well    If you were to increase these medications and if needed there is a longer acting form at 500 mg twice a day.      - if a lower dose is helpful, can stay lower, if higher dose causes side effects, go back to last tolerated dose.  If you get all the way up to the dose recommended and is not helping enough let me know as I will absolutely go higher.    - make sure to stay hydrated while on this as can cause dehydration since that is it's purpose to take fluid off.   - most common side effect is tingling of the nerves at times  - can cause electrolyte disturbances, but not typically in any significant way. However, be cautious if taking with other meds that lower potassium like hydrochlorothiazide etc    *Typically these types of medications take time until you see the benefit, although some may see improvement in days, often it may take weeks, especially if the medication is being titrated up to a beneficial level. Please contact us if there are any concerns or questions regarding the medication.     Lifestyle Recommendations:  [x] SLEEP - Maintain a regular sleep schedule: Adults need at " least 7-8 hours of uninterrupted a night. Maintain good sleep hygiene:  Going to bed and waking up at consistent times, avoiding excessive daytime naps, avoiding caffeinated beverages in the evening, avoid excessive stimulation in the evening and generally using bed primarily for sleeping.  One hour before bedtime would recommend turning lights down lower, decreasing your activity (may read quietly, listen to music at a low volume). When you get into bed, should eliminate all technology (no texting, emailing, playing with your phone, iPad or tablet in bed).  [x] HYDRATION - Maintain good hydration.  Drink  2L of fluid a day (4 typical small water bottles)  [x] DIET - Maintain good nutrition. In particular don't skip meals and try and eat healthy balanced meals regularly.  [x] TRIGGERS - Look for other triggers and avoid them: Limit caffeine to 1-2 cups a day or less. Avoid dietary triggers that you have noticed bring on your headaches (this could include aged cheese, peanuts, MSG, aspartame and nitrates).  [x] EXERCISE - physical exercise as we all know is good for you in many ways, and not only is good for your heart, but also is beneficial for your mental health, cognitive health and  chronic pain/headaches. I would encourage at the least 5 days of physical exercise weekly for at least 30 minutes.     Education and Follow-up  [x] Please call with any questions or concerns. Of course if any new concerning symptoms go to the emergency department.  [x] Follow up 4 months and if doing well and no concerns, certainly you could push it back to 6-months, sooner if needed     Orders:    acetaZOLAMIDE (DIAMOX) 125 mg tablet; 125 mg p.o. in a.m. and can take every 4 hours while awake for 4 doses or if not tolerated can continue taking as you are 125 mg BID      CC:   We had the pleasure of evaluating Yun Simmons in neurological consultation today. Yun Simmons presents today for evaluation of headaches.   History  obtained from patient as well as available medical record review.  History of Present Illness:   Interval history as of 6/4/2025  - Of note/managed by others:   Please see since last visit which include following with PCP for multiple reasons, nephrology, endocrinology, ultrasound thyroid,, ENT for thyroid nodule, GI for multiple reasons, pelvic  Dr. Vargas  - lost 10 pounds   - no significant new or concerning neurologic symptoms since last visit reported  - last eye exam: follows with 2 eye specialists   - sleep: - DESTINEY on CPAP (REM 47)  -following with sleep medicine  Uses nightly     Headaches and migraines   not having migraines but is having headaches that she wakes up with past 2-3 weeks, can be dull ache occipital and frontal    Preventative:    -Taking acetazolamide 125 mg BID (she went down from 125/250 and she is not sure why)  - Currently on through other providers: Losartan/Cozaar 50 mg daily, vitamin D 2000 units daily,  switched to plavix in June 2023, potassium through nephology 15 mEq BID prior (does not like the large pill though), PreserVision for her eyes, currently you not taking iron prescribed by PCP but will if PCP recommends taking it again, I would agree since impacts sleep as well, Myrbetrip 15 mEq BID    Abortive:   - Prescription abortive not indicated at this time, we discussed most likely thing to help for prolonged episode in the future would be steroids if nephrology would be agreeable for short course - she will ask, but PCP has prescribed   - Currently on through other providers: OTC meds    No reported bothersome side effects      Please see previous notes/EMR for details from previous visits.     Objective     Physical Exam:                                                                 Vitals:            Constitutional:    /70 (BP Location: Left arm, Patient Position: Sitting, Cuff Size: Standard)   Pulse 71   Temp 97.8 °F (36.6 °C) (Temporal)   Resp 18  "  Ht 5' 5\" (1.651 m)   Wt 88.2 kg (194 lb 8 oz)   SpO2 96%   BMI 32.37 kg/m²   BP Readings from Last 3 Encounters:   06/04/25 130/70   05/13/25 138/68   04/03/25 120/68     Pulse Readings from Last 3 Encounters:   06/04/25 71   04/03/25 82   02/20/25 66         Well developed, well nourished, well groomed.        Psychiatric:  Normal behavior and appropriate affect        Able to answer questions appropriately, provide history of recent events   Normal language and spontaneous speech.  facial expression symmetric  symmetric bulk throughout. no atrophy, fasciculations or significant abnormal movements noted during our visit from observation.   steady casual gait           Administrative Statements   I have spent a total time of 33 minutes in caring for this patient on the day of the visit/encounter including Diagnostic results, Prognosis, Risks and benefits of tx options, Instructions for management, Patient  education, Importance of tx compliance, Risk factor reductions, Impressions, Counseling / Coordination of care,  Obtaining or reviewing history  , and Communicating with other healthcare professionals .      "

## 2025-06-04 NOTE — PATIENT INSTRUCTIONS
Glad that you already have labs pending through other providers otherwise I typically will check labs when we make adjustments    Continue to follow regularly with eye doctor    Glad that your nephrologist has ruled out primary hyperparathyroidism, But that they continue to follow the thyroid nodule yearly since it is a large.    Discuss with nephrology whether they would be ok with 1-5 days of dexamethasone/Decadron 2 to 4 mg -  if needed to break this in the future.   -Glad to hear that you have tolerated the low-dose of prednisone your PCP has prescribed I just wanted to be extra cautious and not go against any nephrology recommendations    Agree with following up with ENT for post nasal drip and if they think it is appropriate could consider ipratropium bromide nasal spray      I recommend you see one of the following providers in our office:  Jose Quintana PA-C      If your ferritin comes back under 80 I recommend:  Ferritin should be above 80 (some sources say 100) as ferritin less than this may contribute to a lot of issues including headaches, migraines, fatigue, weakness, dizziness, pale skin and brittle nails, shortness of breath, heart palpitations, brain fog, mental fatigue, difficulty focusing, increased susceptibility to infections, cold intolerance, hair loss/telogen effluvium, insomnia/RLS etc. and I recommend considering iron supplementation.      Typically people buy over-the-counter ferrous sulfate which contains 65 mg of elemental iron, I have seen sleep medicine recommend Vitron C which is ferrous fumarate one of the better absorbed iron forms which can be gentler on the stomach and help with absorption compared to ferrous sulfate at times.  It also contains 65 mg of elemental iron +125 mg of vitamin C which boost absorption and has lower risk of  "constipation compared to ferrous sulfate and less GI discomfort for many people.  Ideally should be taking on an empty stomach for best absorption, but can take with food if stomach irritation occurs.  Many patients take before bed for best tolerance.  There are other types of over-the-counter iron that I am not familiar with, I wish I had time to look into and research all the different types, but certainly whichever form is best tolerated for you.  If not tolerating this oral supplementation, or if iron comes back in the single digits, please discuss with PCP (or hematology if you already follow with them) whether they think iron infusions would be indicated as this is not my area of expertise.         Headache/migraine treatment:   Rescue medications (for immediate treatment of a headache):   It is ok to take acetaminophen  if they help your headaches you should limit these to No more than 3 times a week to avoid medication overuse/rebound headaches.     \"exedrin tension\" - caffeine/tylenol       Prescription preventive medications for headaches/migraines   (to take every day to help prevent headaches - not to take at the time of headache):  [x]     -     acetaZOLAMIDE (DIAMOX) 125 mg around 10 AM, 2 PM and 6 PM and absolutely if needed could add a 10 PM dose or could even consider a long-acting dose overnight if ever needed, but we will try and keep you on the lowest dose we need to    If ever needed we could also have you take the 125 at 10 AM 2 PM and continue the 250 mg at 6 PM      Certainly if you happen to awaken at night you absolutely could take 125 mg or 250 mg overnight as well    If you were to increase these medications and if needed there is a longer acting form at 500 mg twice a day.      - if a lower dose is helpful, can stay lower, if higher dose causes side effects, go back to last tolerated dose.  If you get all the way up to the dose recommended and is not helping enough let me know as I will " absolutely go higher.    - make sure to stay hydrated while on this as can cause dehydration since that is it's purpose to take fluid off.   - most common side effect is tingling of the nerves at times  - can cause electrolyte disturbances, but not typically in any significant way. However, be cautious if taking with other meds that lower potassium like hydrochlorothiazide etc    *Typically these types of medications take time until you see the benefit, although some may see improvement in days, often it may take weeks, especially if the medication is being titrated up to a beneficial level. Please contact us if there are any concerns or questions regarding the medication.     Lifestyle Recommendations:  [x] SLEEP - Maintain a regular sleep schedule: Adults need at least 7-8 hours of uninterrupted a night. Maintain good sleep hygiene:  Going to bed and waking up at consistent times, avoiding excessive daytime naps, avoiding caffeinated beverages in the evening, avoid excessive stimulation in the evening and generally using bed primarily for sleeping.  One hour before bedtime would recommend turning lights down lower, decreasing your activity (may read quietly, listen to music at a low volume). When you get into bed, should eliminate all technology (no texting, emailing, playing with your phone, iPad or tablet in bed).  [x] HYDRATION - Maintain good hydration.  Drink  2L of fluid a day (4 typical small water bottles)  [x] DIET - Maintain good nutrition. In particular don't skip meals and try and eat healthy balanced meals regularly.  [x] TRIGGERS - Look for other triggers and avoid them: Limit caffeine to 1-2 cups a day or less. Avoid dietary triggers that you have noticed bring on your headaches (this could include aged cheese, peanuts, MSG, aspartame and nitrates).  [x] EXERCISE - physical exercise as we all know is good for you in many ways, and not only is good for your heart, but also is beneficial for your  mental health, cognitive health and  chronic pain/headaches. I would encourage at the least 5 days of physical exercise weekly for at least 30 minutes.     Education and Follow-up  [x] Please call with any questions or concerns. Of course if any new concerning symptoms go to the emergency department.  [x] Follow up 4 months and if everything is exactly the same certainly you could push it back to 6-months, sooner if needed

## 2025-06-05 PROBLEM — G43.009 MIGRAINE WITHOUT AURA AND WITHOUT STATUS MIGRAINOSUS, NOT INTRACTABLE: Status: ACTIVE | Noted: 2025-06-05

## 2025-06-05 NOTE — ASSESSMENT & PLAN NOTE
Assessment/Plan:   Yun Simmons is a delightful 69 y.o. female with a past medical history that includes hypercalcemia, type 2 diabetes mellitus, thyroid versus parathyroid nodule, DESTINEY on CPAP (1990s), hypertension, postural hypotension, normocytic anemia, hyperlipidemia,  headaches, BMI over 30, visual disturbance, autosomal dominant polycystic kidney disease*, kidney stone, s/p shoulder surgery referred here for evaluation of headache.  My initial evaluation 5/2/2023     Migraine without aura and without status migrainosus, not intractable  Visual disturbance-follows with 2 eye specialists  Features of idiopathic intracranial hypertension on imaging and history without papilledema not meeting criteria for IIH  DESTINEY on CPAP (REM 47)  -following with sleep medicine  She reports a long history of headaches and migraines dating back to her teens and early 20s that seemed to significantly improve following diagnosis and treatment of DESTINEY in the 1990s.  Initially, she felt that was the last time she had headaches, but in recollection she recalls she did follow with a neurologist in [place] in the early 2000's and did round round of Botox for chronic migraine.  She believes migraines may have improved following menopause.  On 3/22/2023, she woke up with a headache and was playing cards when she noticed unilateral peripheral vision loss on the left, unclear if she woke up with that as well, but it persisted and she went to the emergency department where she was admitted for stroke evaluation and found not to have had a stroke.  MRI brain was unremarkable for acute pathology, as was CTA head and neck, carotid ultrasound, TTE, and she was already on aspirin which was continued as well as risk factor control.  She followed up with eye doctor last month and no pertinent issues were found except for early macular degeneration.  She reports the pain was right temporal and therefore ESR and CRP were also obtained which were  only slightly elevated and temporal ultrasound was unremarkable as well.  -As of 5/2/2023: She reports no further migraines, no further vision changes.  We discussed most likely etiology could be either suboptimally treated DESTINEY contributing to exacerbation of migraine or related to the hypercalcemia.  She mentions some mild memory issues which are not atypical for age and MOCA today was normal.  - as of 6/29/2023: She returns sooner than expected after an incident on 6/6/2023 as detailed in HPI where she had left hand paresthesias that radiate up to the left neck and then down the left side of the body lasting approximately 20 minutes not associated with headache.  She was admitted to the hospital for MRI brain and MRA head were unremarkable for acute pathology and we discussed for my review she does appear to have subtle signs of idiopathic intracranial hypertension and I suggest trial of acetazolamide/Diamox to see if this is the cause of the visual issues she has been following with 2 eye doctors for despite no papilledema.  She is using her CPAP and AHI is less than 1 without leak.  Following up with nephrology for hypercalcemia.  We discussed that I am not convinced the episode was a TIA, but since the inpatient neurologist recommended transition to Plavix, I would not disagree with their recommendations out of precaution.    - as of 11/7/2023: She reports doing very well on acetazolamide/Diamox 250 mg twice daily with only 1 headache, if that, in a month that is not that severe and more of a pressure sensation that acetaminophen helps.  She continues to use her CPAP anytime she is sleeping and we will try and find the old sleep records as she wonders what her AHI was.  She is following closely with ophthalmology every 6 months to different specialists and no papilledema noted.  No return of the symptoms that admitted her in June.    - as of 3/18/2024: She reports headaches have resolved on acetazolamide 125 mg  twice daily which she decreased from 250 mg twice daily around November per PCP recommendations due to chloride being elevated by 1 number at 107 and we discussed certainly if needed could increase the medication again although would recommend every 4 hours rather than just twice a day as I suspect she may have wearing off midday.  Recommended resuming PreserVision through eye doctor for macular degeneration which she recently discontinued and she is having more floaters, follows closely with two eye doctors.  She also stopped iron recently which we discussed could have a negative impact on sleep.  Otherwise she is doing excellent using her CPAP every night all night.  - as of 7/30/2024: She reports no headaches or migraines on acetazolamide 125 mg in a.m. and 250 mg in p.m. with no bothersome side effects.  Recent labs reviewed and follow-up CMP scheduled through other providers although such a tiny dose unlikely to have findings that would make as have to discontinue.  Following with nephrology closely and discussed her overall health as well today.  She is treating sleep apnea with CPAP all night which tends to be around 6 hours and wakes around 4 AM and we discussed ideally if she could fall back asleep at 4 AM it could help with her fatigue that started after she began Mounjaro injections 3 weeks ago.   - as of 12/11/2024: She reports that she is doing wonderfully on acetazolamide/Diamox 125/250 mg without any significant headaches or migraines.  She is treating her sleep apnea all night every night and cannot sleep without it which is wonderful.  She is taking her potassium through nephrology and has not had a kidney stone in years, but is looking for a smaller pill that would be easier to swallow.  We also discussed her acetazolamide dosing it sounds like she could be having some wearing off effect between doses and I recommend considering 125 mg at 10 AM, 2 PM, 6 PM and if needed could take 1 at 10 PM and  overnight if needed for wearing off but will try and keep at the same dose for now at 375 mg in a day.   - as of 6/4/2025: She reports she is not having migraines at all on acetazolamide 125 mg BID and treating her sleep apnea every night. She does not recall why she decreased it from 125/250mg and we discussed she can absolutely continue to take it like this, it is a tiny dose though and since she is waking up with milder headaches the past 2-3 weeks, she could trial taking 125 mg every 4 hours for 4 doses in a day to see if that helps as that is also a very small dose and could help. Not worth switching to long acting capsules as those automatically would bring her to [id number] mg a day. She is also happy to report 10 pound weight loss, appetite is down on mounjaro and making healthier choices when she eats. Following with an excellent urinary specialist, myrbetriq has helped and has a procedure planned that he thinks will help as well.     Workup:  - MOCA normal 27/30 (-2 recall, -1 repeat sentence)   -MRI brain pituitary with and without contrast 3/22/2023: 1.  Unremarkable exam of the pituitary.  No evidence of lesion.  2.  Mild microangiopathic change.  No evidence of acute infarct, intracranial hemorrhage or mass.  -Carotid ultrasound 3/22/2023: There is no evidence of giant cell arteritis, aneurysm, or significant stenosis  noted in the superficial temporal artery and its branches  -CTA head and neck with and without contrast 3/22/2023: No significant stenosis of the cervical carotid or vertebral arteries.  No significant intracranial stenosis, large vessel occlusion or aneurysm.  - MRI brain without contrast 6/7/2023: 1. No acute infarction.   2. Mild chronic microvascular changes. Remote white matter change just   lateral to the atrium of left lateral ventricle.   3. Tiny susceptible focus, left temporal lobe, as described (It likely reflects a remote petechial hemorrhage or  small vascular calcification).  Neither remote   petechial hemorrhage or small vascular calcification.   4. No mass effect or midline shift.   *Above per radiology, as of my retrospective review 11/7/2023 we discussed she has a partially empty sella but nearly completely empty, right greater than left optic nerve sheath prominence with slight tortuosity, ventricles are not extremely tight although if so a little bit on the right, cerebellar tonsils overlie the foramen magnum without Chiari   - MRA brain 6/7/2023: Evaluation of the petrous, cavernous and supraclinoid portions of the distal internal carotid arteries as well as the anterior cerebral and middle   cerebral arteries is unremarkable without evidence of aneurysm, stenosis or   major vessel occlusion.     -MRA neck and carotid ultrasound 6/7/2023: negative    Preventative:  - we discussed headache hygiene and lifestyle factors that may improve headaches  -     acetaZOLAMIDE (DIAMOX) 125 mg tablet; 125 mg p.o. in a.m. and can take every 4 hours while awake for 4 doses or if not tolerated can continue taking as you are 125 mg BID. With plenty of room to adjust up or down as needed/tolerated but will continue here for now at this tiny dose, as she is doing well.  Discussed proper use, off label use with certain meds, possible side effects and risks.  - Currently on through other providers: Losartan/Cozaar 50 mg daily, vitamin D 2000 units every other day,  switched to plavix in June 2023, potassium through nephology 15 mEq BID prior to diamox (does not like the large pill though), PreserVision for her eyes, potassium citrate through nephrology- she hated it due to size but taking 15 mEq BID, Myrbetrip 15 mEq BID, monjaro for 2 weeks - holding off now prior to procedures 6/9/25  - Past/ failed/contraindicated: Vasotec, gabapentin 100 mg   - future options: Topiramate although with caution due to history of kidney stones, verapamil, CGRP med, botox    Rescue:  - recommend not taking  over-the-counter or prescription pain medications more than 3 days per week to prevent medication overuse/rebound headache  - Prescription abortive not indicated at this time, we discussed most likely thing to help for prolonged episode in the future would be steroids if nephrology would be agreeable for short course - she will ask, but PCP has prescribed   - Currently on through other providers: OTC meds  - Past/ failed/contraindicated: Fioricet  -Past/tolerated: PCP gave her short dose of steroids for her current back pain, heel issue, TMJ  - future options:  NO NSAIDS due to PLAVIX Triptan if no contraindication, prochlorperazine,, could consider trial of 5 days of Depakote 500 mg nightly or dexamethasone 2 mg daily for prolonged migraine, ubrelvy, reyvow, nurtec  *Would use the lowest dose of steroids if needed    Patient instructions        Glad that you already have labs pending through other providers otherwise I typically will check labs when we make adjustments    Continue to follow regularly with eye doctor    Glad that your nephrologist has ruled out primary hyperparathyroidism, But that they continue to follow the thyroid nodule yearly since it is a large.    Discuss with nephrology whether they would be ok with 1-5 days of dexamethasone/Decadron 2 to 4 mg -  if needed to break this in the future.   -Glad to hear that you have tolerated the low-dose of prednisone your PCP has prescribed I just wanted to be extra cautious and not go against any nephrology recommendations    Agree with following up with ENT for post nasal drip and if they think it is appropriate could consider ipratropium bromide nasal spray      I recommend you see one of the following providers in our office:  Jose Quintana PA-C      If your ferritin comes back under 80 I recommend:  Ferritin  "should be above 80 (some sources say 100) as ferritin less than this may contribute to a lot of issues including headaches, migraines, fatigue, weakness, dizziness, pale skin and brittle nails, shortness of breath, heart palpitations, brain fog, mental fatigue, difficulty focusing, increased susceptibility to infections, cold intolerance, hair loss/telogen effluvium, insomnia/RLS etc. and I recommend considering iron supplementation.      Typically people buy over-the-counter ferrous sulfate which contains 65 mg of elemental iron, I have seen sleep medicine recommend Vitron C which is ferrous fumarate one of the better absorbed iron forms which can be gentler on the stomach and help with absorption compared to ferrous sulfate at times.  It also contains 65 mg of elemental iron +125 mg of vitamin C which boost absorption and has lower risk of constipation compared to ferrous sulfate and less GI discomfort for many people.  Ideally should be taking on an empty stomach for best absorption, but can take with food if stomach irritation occurs.  Many patients take before bed for best tolerance.  There are other types of over-the-counter iron that I am not familiar with, I wish I had time to look into and research all the different types, but certainly whichever form is best tolerated for you.  If not tolerating this oral supplementation, or if iron comes back in the single digits, please discuss with PCP (or hematology if you already follow with them) whether they think iron infusions would be indicated as this is not my area of expertise.         Headache/migraine treatment:   Rescue medications (for immediate treatment of a headache):   It is ok to take acetaminophen  if they help your headaches you should limit these to No more than 3 times a week to avoid medication overuse/rebound headaches.     \"exedrin tension\" - caffeine/tylenol       Prescription preventive medications for headaches/migraines   (to take every day " to help prevent headaches - not to take at the time of headache):  [x]     -     acetaZOLAMIDE (DIAMOX) 125 mg around 10 AM, 2 PM and 6 PM and absolutely if needed could add a 10 PM dose or could even consider a long-acting dose overnight if ever needed, but we will try and keep you on the lowest dose we need to    If ever needed we could also have you take the 125 at 10 AM 2 PM and continue the 250 mg at 6 PM      Certainly if you happen to awaken at night you absolutely could take 125 mg or 250 mg overnight as well    If you were to increase these medications and if needed there is a longer acting form at 500 mg twice a day.      - if a lower dose is helpful, can stay lower, if higher dose causes side effects, go back to last tolerated dose.  If you get all the way up to the dose recommended and is not helping enough let me know as I will absolutely go higher.    - make sure to stay hydrated while on this as can cause dehydration since that is it's purpose to take fluid off.   - most common side effect is tingling of the nerves at times  - can cause electrolyte disturbances, but not typically in any significant way. However, be cautious if taking with other meds that lower potassium like hydrochlorothiazide etc    *Typically these types of medications take time until you see the benefit, although some may see improvement in days, often it may take weeks, especially if the medication is being titrated up to a beneficial level. Please contact us if there are any concerns or questions regarding the medication.     Lifestyle Recommendations:  [x] SLEEP - Maintain a regular sleep schedule: Adults need at least 7-8 hours of uninterrupted a night. Maintain good sleep hygiene:  Going to bed and waking up at consistent times, avoiding excessive daytime naps, avoiding caffeinated beverages in the evening, avoid excessive stimulation in the evening and generally using bed primarily for sleeping.  One hour before bedtime  would recommend turning lights down lower, decreasing your activity (may read quietly, listen to music at a low volume). When you get into bed, should eliminate all technology (no texting, emailing, playing with your phone, iPad or tablet in bed).  [x] HYDRATION - Maintain good hydration.  Drink  2L of fluid a day (4 typical small water bottles)  [x] DIET - Maintain good nutrition. In particular don't skip meals and try and eat healthy balanced meals regularly.  [x] TRIGGERS - Look for other triggers and avoid them: Limit caffeine to 1-2 cups a day or less. Avoid dietary triggers that you have noticed bring on your headaches (this could include aged cheese, peanuts, MSG, aspartame and nitrates).  [x] EXERCISE - physical exercise as we all know is good for you in many ways, and not only is good for your heart, but also is beneficial for your mental health, cognitive health and  chronic pain/headaches. I would encourage at the least 5 days of physical exercise weekly for at least 30 minutes.     Education and Follow-up  [x] Please call with any questions or concerns. Of course if any new concerning symptoms go to the emergency department.  [x] Follow up 4 months and if doing well and no concerns, certainly you could push it back to 6-months, sooner if needed     Orders:    acetaZOLAMIDE (DIAMOX) 125 mg tablet; 125 mg p.o. in a.m. and can take every 4 hours while awake for 4 doses or if not tolerated can continue taking as you are 125 mg BID

## 2025-06-09 ENCOUNTER — HOSPITAL ENCOUNTER (OUTPATIENT)
Dept: MRI IMAGING | Facility: HOSPITAL | Age: 70
Discharge: HOME | End: 2025-06-09
Attending: INTERNAL MEDICINE
Payer: COMMERCIAL

## 2025-06-09 DIAGNOSIS — K83.8 DILATED CBD, ACQUIRED: ICD-10-CM

## 2025-06-09 PROCEDURE — A9585 GADOBUTROL INJECTION: HCPCS | Performed by: INTERNAL MEDICINE

## 2025-06-09 PROCEDURE — 74183 MRI ABD W/O CNTR FLWD CNTR: CPT

## 2025-06-09 RX ORDER — GADOBUTROL 604.72 MG/ML
8 INJECTION INTRAVENOUS
Status: COMPLETED | OUTPATIENT
Start: 2025-06-09 | End: 2025-06-09

## 2025-06-09 RX ADMIN — GADOBUTROL 8 ML: 604.72 INJECTION INTRAVENOUS at 19:36

## 2025-06-13 ENCOUNTER — TELEPHONE (OUTPATIENT)
Dept: GASTROENTEROLOGY | Facility: CLINIC | Age: 70
End: 2025-06-13

## 2025-06-13 NOTE — TELEPHONE ENCOUNTER
Our mutual patient is scheduled for a procedure: EGD    On 7/8/25    With: Dr. Maxwell    He/She is taking the following blood thinner: Plavix    Can this be stopped 5 Days prior to the procedure?    Physician approving Clearance: Dr. Magallanes    Last dose will be:  7/2/25

## 2025-06-16 ENCOUNTER — TELEPHONE (OUTPATIENT)
Dept: GASTROENTEROLOGY | Facility: CLINIC | Age: 70
End: 2025-06-16

## 2025-06-16 ENCOUNTER — PATIENT MESSAGE (OUTPATIENT)
Dept: ENDOCRINOLOGY | Facility: HOSPITAL | Age: 70
End: 2025-06-16

## 2025-06-16 NOTE — TELEPHONE ENCOUNTER
Patient returned call stating that she was put on Plavix from a hospital in [place] when she experienced numbness. They told her Aspirin was not enough so prescribed the Plavix.  Her PCP has been refilling ever since.  Dr. Fernandez

## 2025-06-16 NOTE — TELEPHONE ENCOUNTER
LVM for pt asking her to call back with the provider that prescribes her Plavix. She had originally told us it was Dr. Magallanes but I received a message back saying that she does not prescribe it. Need to get clearance prior to her procedure 7/8/25.

## 2025-06-25 ENCOUNTER — TELEPHONE (OUTPATIENT)
Dept: GASTROENTEROLOGY | Facility: CLINIC | Age: 70
End: 2025-06-25

## 2025-06-25 NOTE — TELEPHONE ENCOUNTER
Procedure confirmed: Endoscopy    Via: Spoke with Patient    Instructions given: Given to Patient at Visit    Prep Given: N/A    Provider: Dr. Roman Maxwell    Date: 7/8/25    Location: [address]  [address]    Medication holds: Yes  Plavix-confirmed with pt last dose is 7/2/25      Diabetic: Yes     Call the office if there are any questions.       [phone]

## 2025-06-26 ENCOUNTER — TELEPHONE (OUTPATIENT)
Age: 70
End: 2025-06-26

## 2025-06-26 NOTE — TELEPHONE ENCOUNTER
Patient is calling and was hoping to switch providers and see Dr. Cisneros for assistance with her sleep apnea. I advised patient that the only appointment available would be 7/1 at 3 pm in [place]. Patient states [place] would be too much of a distance. I offered to schedule patient with one of our APs in [place] but patient was not interested in that and asked about Dr. Rosa. I advised her that I do not have access to Dr. Rosa's schedule and she would have to call the sleep center for that. I provided the number to the sleep center [phone] and transferred patient over for further assistance.       Thank you.

## 2025-06-28 LAB
ALBUMIN SERPL-MCNC: 4 G/DL (ref 3.9–4.9)
ALP SERPL-CCNC: 88 IU/L (ref 44–121)
ALT SERPL-CCNC: 16 IU/L (ref 0–32)
AST SERPL-CCNC: 10 IU/L (ref 0–40)
BILIRUB SERPL-MCNC: 0.3 MG/DL (ref 0–1.2)
BUN SERPL-MCNC: 19 MG/DL (ref 8–27)
BUN/CREAT SERPL: 25 (ref 12–28)
CALCIUM SERPL-MCNC: 10.2 MG/DL (ref 8.7–10.3)
CHLORIDE SERPL-SCNC: 107 MMOL/L (ref 96–106)
CO2 SERPL-SCNC: 20 MMOL/L (ref 20–29)
CREAT SERPL-MCNC: 0.77 MG/DL (ref 0.57–1)
EGFR: 83 ML/MIN/1.73
EST. AVERAGE GLUCOSE BLD GHB EST-MCNC: 134 MG/DL
GLOBULIN SER-MCNC: 2.8 G/DL (ref 1.5–4.5)
GLUCOSE SERPL-MCNC: 140 MG/DL (ref 70–99)
HBA1C MFR BLD: 6.3 % (ref 4.8–5.6)
POTASSIUM SERPL-SCNC: 4.3 MMOL/L (ref 3.5–5.2)
PROT SERPL-MCNC: 6.8 G/DL (ref 6–8.5)
SODIUM SERPL-SCNC: 141 MMOL/L (ref 134–144)

## 2025-07-07 ENCOUNTER — CONSULT (OUTPATIENT)
Dept: ENDOCRINOLOGY | Facility: HOSPITAL | Age: 70
End: 2025-07-07
Payer: COMMERCIAL

## 2025-07-07 VITALS
HEIGHT: 65 IN | HEART RATE: 67 BPM | DIASTOLIC BLOOD PRESSURE: 74 MMHG | SYSTOLIC BLOOD PRESSURE: 130 MMHG | BODY MASS INDEX: 32.72 KG/M2 | WEIGHT: 196.4 LBS

## 2025-07-07 DIAGNOSIS — E04.1 THYROID NODULE: Primary | ICD-10-CM

## 2025-07-07 DIAGNOSIS — I10 ESSENTIAL HYPERTENSION: ICD-10-CM

## 2025-07-07 DIAGNOSIS — M81.0 AGE-RELATED OSTEOPOROSIS WITHOUT CURRENT PATHOLOGICAL FRACTURE: ICD-10-CM

## 2025-07-07 DIAGNOSIS — E66.01 MORBID OBESITY (HCC): ICD-10-CM

## 2025-07-07 DIAGNOSIS — E04.2 NON-TOXIC MULTINODULAR GOITER: ICD-10-CM

## 2025-07-07 DIAGNOSIS — E78.5 HYPERLIPIDEMIA, UNSPECIFIED HYPERLIPIDEMIA TYPE: ICD-10-CM

## 2025-07-07 DIAGNOSIS — E11.69 TYPE 2 DIABETES MELLITUS WITH OTHER SPECIFIED COMPLICATION, WITHOUT LONG-TERM CURRENT USE OF INSULIN (HCC): ICD-10-CM

## 2025-07-07 DIAGNOSIS — E11.9 TYPE 2 DIABETES MELLITUS WITHOUT COMPLICATION, WITHOUT LONG-TERM CURRENT USE OF INSULIN (HCC): ICD-10-CM

## 2025-07-07 PROCEDURE — 99214 OFFICE O/P EST MOD 30 MIN: CPT | Performed by: STUDENT IN AN ORGANIZED HEALTH CARE EDUCATION/TRAINING PROGRAM

## 2025-07-07 PROCEDURE — G2211 COMPLEX E/M VISIT ADD ON: HCPCS | Performed by: STUDENT IN AN ORGANIZED HEALTH CARE EDUCATION/TRAINING PROGRAM

## 2025-07-07 NOTE — PROGRESS NOTES
Name: Yun Simmons      : 1955      MRN: [id number]  Encounter Provider: Danna Chamberlain MD  Encounter Date: 2025   Encounter department: [facility]    No chief complaint on file.  :  Assessment & Plan      Patient is a 69yF with T2DM, htn, hlp, DESTINEY and known non toxic MNG who presents today to discuss her US     1) Non toxic MNG:- Reviewed patients previous US from  and . Does not have any compressive symptoms and low risk for malignancy otherwise. Reviewed recent US which does show a slight increase in size on right sided nodule but mostly cystic and hence not a concern. Repeat US in 1 year     2) Diabetes:- patients last A1C was 7.2% and now 6.3% which is excellent. currently only on metformin.  C/w this and repeat labs every 3 months still d/t being wax/wane out of diabetic/prediabetic range.   May want to consider mounjaro in future IF BG gets worse.      3) Hx of Hypercalcemia:- reviewed her past labs with high calcium but low normal PTH which makes her hypercalcemia not PTH related. Most recent calcium is normal. Will follow along on CMP in next visit and see if this needs more workup.      4) Osteoporosis- followed by rheum. On reclast.      Discussed with the patient and all questioned fully answered. She will call me if any problems arise.     Follow-up appointment in 3 months.     Pertinent Medical History     History of Present Illness     Yun Simmons is a 69 y.o. female history of T2DM, DESTINEY, obesity, htn, hlp and known thyroid nodule, osteoporosis on reclast- managed by rheum, PKD follows nephrology who presents for follow up      Patient has known MNG for years. Previously seen by [facility]. Patient has known MNG since . Previously had US through [facility] and then at [facility]. Last US done  shows a 2.5cm TR3 right midgland nodule- stable compared to US in - [place], 1.2 cm TR3 right lower- stable from ,  3.5cm TR 3 left midgland- stable since 2022 US. Had FNA of left thyroid nodule in 2021 which was positive for ?follicular neoplasm with bethasda 4- affirma neg.   Most recent US 2/25 showed   Right 2.6cm TR3 nodule- mostly cystic  Right 1.2mc TR3 unchanged  Left mid 3.5mc TR3- unchanged  Most recent labs TSH 1.6 at 4/25    Diabetes-   Patient flustuates between diabetic and prediabetic range.   Currently on metformin 1000mg daily   Most recent A1C now 6.3%  Does not check BG  Diet- reports has been watching her portions    Osteoporosis:- Managed by Rheum, Patient does get reclast every year.        Review of Systems   Constitutional:  Negative for diaphoresis, fatigue and unexpected weight change.   Eyes:  Negative for photophobia and visual disturbance.   Gastrointestinal:  Negative for constipation, diarrhea and vomiting.   Endocrine: Negative for polydipsia and polyuria.   Skin: Negative.     as per HPI       Medical History Reviewed by provider this encounter:     .    Objective   There were no vitals taken for this visit.     There is no height or weight on file to calculate BMI.  Wt Readings from Last 3 Encounters:   06/04/25 88.2 kg (194 lb 8 oz)   05/13/25 90.1 kg (198 lb 9.6 oz)   04/10/25 89.8 kg (198 lb)     Physical Exam  Constitutional:       Appearance: Normal appearance. She is obese.     Cardiovascular:      Rate and Rhythm: Normal rate.   Pulmonary:      Effort: Pulmonary effort is normal.   Abdominal:      General: Bowel sounds are normal.      Palpations: Abdomen is soft.     Skin:     General: Skin is warm.      Capillary Refill: Capillary refill takes less than 2 seconds.     Neurological:      General: No focal deficit present.      Mental Status: She is alert.     Psychiatric:         Mood and Affect: Mood normal.         Labs:       THYROID ULTRASOUND     INDICATION: E04.1: Nontoxic single thyroid nodule.     COMPARISON: Thyroid ultrasound 3/31/2022 and 8/11/2021     TECHNIQUE: Ultrasound of  the thyroid was performed with a high frequency linear transducer in transverse and sagittal planes including volumetric imaging sweeps as well as traditional still imaging technique.     FINDINGS:  Thyroid texture: Thyroid parenchyma is diffusely heterogeneous in echotexture.     Right lobe: 4.6 x 1.9 x 2.2 cm. Volume 9.3 mL  Left lobe: 5.5 x 2.6 x 2.5 cm. Volume 17.1 mL  Isthmus: 0.6 cm.     Nodule #1. Image 18.  RIGHT midgland nodule measuring 2.6 x 1.8 x 2.1 cm. Mildly increased size due to new small benign cystic component. (Unchanged from US in 2023 from [place])   COMPOSITION: 2 points, solid or almost completely solid. New small cystic component.  ECHOGENICITY: 1 point, hyperechoic or isoechoic.  SHAPE: 0 points, wider-than-tall.  MARGIN: 0 points, smooth.  ECHOGENIC FOCI: 0 points, none or large comet-tail artifacts.  TI-RADS Classification: TR 3 (3 points), FNA if >/= 2.5 cm. Follow if >/= 1.5 cm.     Nodule #2. Image 24.  RIGHT lower pole nodule measuring 0.8 x 1.2 x 1.2 cm. Given differences in measuring technique, no significant change from prior. I have remeasured this nodule on the transverse cine images.  COMPOSITION: 2 points, solid or almost completely solid.  ECHOGENICITY: 1 point, hyperechoic or isoechoic.  SHAPE: 0 points, wider-than-tall.  MARGIN: 0 points, ill-defined.  ECHOGENIC FOCI: 0 points, none or large comet-tail artifacts.  TI-RADS Classification: TR 3 (3 points), FNA if >/= 2.5 cm. Follow if >/= 1.5 cm.     Nodule #3. Image 55.  LEFT midgland nodule measuring 3.5 x 2.5 x 2.2 cm cm. Given differences in measuring technique, no significant change from prior. (Unchanged from 2023)  COMPOSITION: 2 points, solid or almost completely solid.  ECHOGENICITY: 1 point, hyperechoic or isoechoic. Heterogeneous echogenicity.  SHAPE: 0 points, wider-than-tall.  MARGIN: 0 points, smooth.  ECHOGENIC FOCI: 0 points, none or large comet-tail artifacts.  TI-RADS Classification: TR 3 (3 points), FNA if >/=  2.5 cm. Follow if >/= 1.5 cm.  This nodule has had a previous benign biopsy.  As it is stable, no further sampling recommended at this time.  Further surveillance at 2 year intervals could be considered   to confirm continued stability for a period of greater than 5 years.     IMPRESSION:     Multinodular thyroid gland with previous non-malignant biopsy results as above. Although the 2.6 cm right TR 3 nodule now meets size criteria for biopsy, the minimal increase in size is is attributed to new small benign cystic component. As such,   continued surveillance is appropriate. Based on 2015 VAN guidelines, additional follow-up ultrasound should be performed in 12 to 24 months.        2023  US THYROID  Order: [id number]  Impression     IMPRESSION:    Stable thyroid nodules as detailed.      RECOMMENDATIONS:  TI-RADS 1 and 2:     Ultrasound surveillance and biopsy are not necessary.  TI-RADS 3:     Ultrasound surveillance at 1, 3, and 5 years for nodules between 1.5 and 2.5 cm.     Biopsy is recommended for nodules 2.5 cm or greater.  TI-RADS 4:     Ultrasound surveillance at 1, 2, 3, and 5 years for nodules between 1 and 1.5 cm.     Biopsy is recommended for 1.5 cm or greater.  TI-RADS 5:     Annual ultrasound for up to 5 years for nodules between 0.5 and 1 cm.     Biopsy is recommended for nodules 1 cm or greater.    Imaging surveillance can stop at 5 years if there is no change in size. Recommendations may differ based on biopsy results, patient factors and other comorbidities.    CITATION:  Kael FN, Shahana WD, Taj EG, et al. ACR Thyroid Imaging, Reporting and Data System (TI-RADS): White Paper of the ACR TI-RADS Committee. J Am Teresa Radiol 2017;14:587-595.  Narrative     THYROID ULTRASOUND    INDICATION: Thyroid nodules    COMPARISON: Ultrasound dated 5/11/2023    TECHNIQUE: Ultrasound of the thyroid gland was performed. Color Doppler was utilized.    FINDINGS:    The RIGHT thyroid lobe measures 4.9 x 2.1 x  2.2 cm.  The LEFT thyroid lobe measures 5.2 x 2.2 x 2.4 cm.  The thyroid isthmus measures 0.5 cm.  The thyroid gland is homogenous in echotexture and normal in vascularity.    Thyroid nodules:    Right -  #N1: Solid isoechoic right thyroid nodule measuring 2.6 x 2 x 1.7 cm, previously 2.4 x 2 x 1.5 cm,, probably not changed given differences in technique. This again demonstrates some cystic component. TR 3.    Left -  #N1: Left thyroid predominantly solid isoechoic nodule with some cystic change measuring 3.4 x 2.2 x 2 cm, previously 3.5 x 2.2 x 2 cm. TR 3.    Previously seen possible right parathyroid nodule was not identified here.         I have reviewed pertinent labs including:   Lab Results   Component Value Date    HGBA1C 6.3 (H) 06/27/2025    HGBA1C 7.2 01/27/2025    HGBA1C 6.4 (H) 10/25/2024      Lab Results   Component Value Date    CREATININE 0.77 06/27/2025    CREATININE 0.69 10/25/2024    CREATININE 0.77 11/02/2023    BUN 19 06/27/2025    K 4.3 06/27/2025     (H) 06/27/2025    CO2 20 06/27/2025      eGFRcr   Date Value Ref Range Status   10/27/2022 85 >59 Final     eGFR   Date Value Ref Range Status   06/27/2025 83 >59 mL/min/1.73 Final   03/23/2023 90 ml/min/1.73sq m Final      HDL   Date Value Ref Range Status   09/22/2021 44 >39 mg/dL Final     HDL, Direct   Date Value Ref Range Status   03/23/2023 36 (L) >=50 mg/dL Final     Triglycerides   Date Value Ref Range Status   03/23/2023 84 See Comment mg/dL Final     Comment:     Triglyceride:     0-9Y            <75mg/dL     10Y-17Y         <90 mg/dL       >=18Y     Normal          <150 mg/dL     Borderline High 150-199 mg/dL     High            200-499 mg/dL        Very High       >499 mg/dL    Specimen collection should occur prior to N-Acetylcysteine or Metamizole administration due to the potential for falsely depressed results.   09/22/2021 126 0 - 149 mg/dL Final     T. Chol/HDL Ratio   Date Value Ref Range Status   09/22/2021 3.0 0.0 - 4.4  "ratio Final     Comment:                                       T. Chol/HDL Ratio                                              Men  Women                                1/2 Avg.Risk  3.4    3.3                                    Avg.Risk  5.0    4.4                                 2X Avg.Risk  9.6    7.1                                 3X Avg.Risk 23.4   11.0        ALT   Date Value Ref Range Status   06/27/2025 16 0 - 32 IU/L Final   10/27/2022 47 <56 U/L Final     AST   Date Value Ref Range Status   06/27/2025 10 0 - 40 IU/L Final   10/27/2022 27 <41 U/L Final     Alkaline Phosphatase   Date Value Ref Range Status   10/27/2022 97 35 - 120 U/L Final      No results found for: \"[id number]\"   Lab Results   Component Value Date    FREET4 1.09 04/15/2025      No results found for: \"[id number]\"   Radiology Results Review: I have reviewed the following images/report studies in PACS: US thyroid  Result Date: 3/4/2025  Impression     Multinodular thyroid gland with previous non-malignant biopsy results as above. Although the 2.6 cm right TR 3 nodule now meets size criteria for biopsy, the minimal increase in size is is attributed to new small benign cystic component. As such, continued surveillance is appropriate. Based on 2015 VAN guidelines, additional follow-up ultrasound should be performed in 12 to 24 months.             Reference: ACR Thyroid Imaging, Reporting and Data System (TI-RADS): White Paper of the ACR TI-RADS Committee. J AM Teresa Radiol 2017;14:587-595. Additional recommendations based on American Thyroid Association 2015 guidelines.         Workstation performed: [id number]         US THYROID  Result Date: 5/14/2024  Impression IMPRESSION:     Stable thyroid nodules as detailed.         RECOMMENDATIONS: TI-RADS 1 and 2:    Ultrasound surveillance and biopsy are not necessary. TI-RADS 3:    Ultrasound surveillance at 1, 3, and 5 years for nodules between 1.5 and 2.5 cm.    Biopsy is recommended for " nodules 2.5 cm or greater. TI-RADS 4:    Ultrasound surveillance at 1, 2, 3, and 5 years for nodules between 1 and 1.5 cm.    Biopsy is recommended for 1.5 cm or greater. TI-RADS 5:    Annual ultrasound for up to 5 years for nodules between 0.5 and 1 cm.    Biopsy is recommended for nodules 1 cm or greater.     Imaging surveillance can stop at 5 years if there is no change in size. Recommendations may differ based on biopsy results, patient factors and other comorbidities.     CITATION: Kael FN, Shahana WD, Taj EG, et al. ACR Thyroid Imaging, Reporting and Data System (TI-RADS): White Paper of the ACR TI-RADS Committee. J Am Teresa Radiol 2017;14:587-595.     There are no Patient Instructions on file for this visit.    Discussed with the patient and all questioned fully answered. She will call me if any problems arise.

## 2025-07-08 ENCOUNTER — HOSPITAL ENCOUNTER (OUTPATIENT)
Dept: GASTROENTEROLOGY | Facility: HOSPITAL | Age: 70
Setting detail: OUTPATIENT SURGERY
Discharge: HOME | End: 2025-07-08
Attending: INTERNAL MEDICINE
Payer: COMMERCIAL

## 2025-07-08 ENCOUNTER — ANESTHESIA (OUTPATIENT)
Dept: GASTROENTEROLOGY | Facility: HOSPITAL | Age: 70
End: 2025-07-08
Payer: COMMERCIAL

## 2025-07-08 ENCOUNTER — ANESTHESIA EVENT (OUTPATIENT)
Dept: GASTROENTEROLOGY | Facility: HOSPITAL | Age: 70
End: 2025-07-08
Payer: COMMERCIAL

## 2025-07-08 VITALS
BODY MASS INDEX: 32.65 KG/M2 | HEART RATE: 69 BPM | DIASTOLIC BLOOD PRESSURE: 60 MMHG | OXYGEN SATURATION: 97 % | WEIGHT: 196 LBS | SYSTOLIC BLOOD PRESSURE: 124 MMHG | HEIGHT: 65 IN | RESPIRATION RATE: 20 BRPM | TEMPERATURE: 97.4 F

## 2025-07-08 DIAGNOSIS — R13.19 ESOPHAGEAL DYSPHAGIA: ICD-10-CM

## 2025-07-08 DIAGNOSIS — R10.13 EPIGASTRIC ABDOMINAL PAIN: ICD-10-CM

## 2025-07-08 LAB — GLUCOSE SERPL-MCNC: 144 MG/DL (ref 65–140)

## 2025-07-08 PROCEDURE — 82948 REAGENT STRIP/BLOOD GLUCOSE: CPT

## 2025-07-08 PROCEDURE — 88305 TISSUE EXAM BY PATHOLOGIST: CPT | Performed by: STUDENT IN AN ORGANIZED HEALTH CARE EDUCATION/TRAINING PROGRAM

## 2025-07-08 RX ORDER — SODIUM CHLORIDE, SODIUM LACTATE, POTASSIUM CHLORIDE, CALCIUM CHLORIDE 600; 310; 30; 20 MG/100ML; MG/100ML; MG/100ML; MG/100ML
125 INJECTION, SOLUTION INTRAVENOUS CONTINUOUS
Status: DISCONTINUED | OUTPATIENT
Start: 2025-07-08 | End: 2025-07-12 | Stop reason: HOSPADM

## 2025-07-08 RX ORDER — LIDOCAINE HYDROCHLORIDE 10 MG/ML
INJECTION, SOLUTION EPIDURAL; INFILTRATION; INTRACAUDAL; PERINEURAL AS NEEDED
Status: DISCONTINUED | OUTPATIENT
Start: 2025-07-08 | End: 2025-07-08

## 2025-07-08 RX ORDER — PROPOFOL 10 MG/ML
INJECTION, EMULSION INTRAVENOUS AS NEEDED
Status: DISCONTINUED | OUTPATIENT
Start: 2025-07-08 | End: 2025-07-08

## 2025-07-08 RX ORDER — PROPOFOL 10 MG/ML
INJECTION, EMULSION INTRAVENOUS CONTINUOUS PRN
Status: DISCONTINUED | OUTPATIENT
Start: 2025-07-08 | End: 2025-07-08

## 2025-07-08 RX ADMIN — LIDOCAINE HYDROCHLORIDE 50 MG: 10 INJECTION, SOLUTION EPIDURAL; INFILTRATION; INTRACAUDAL at 10:21

## 2025-07-08 RX ADMIN — PROPOFOL 100 MCG/KG/MIN: 10 INJECTION, EMULSION INTRAVENOUS at 10:21

## 2025-07-08 RX ADMIN — TOPICAL ANESTHETIC 1 SPRAY: 200 SPRAY DENTAL; PERIODONTAL at 10:20

## 2025-07-08 RX ADMIN — PROPOFOL 150 MG: 10 INJECTION, EMULSION INTRAVENOUS at 10:21

## 2025-07-08 RX ADMIN — SODIUM CHLORIDE, SODIUM LACTATE, POTASSIUM CHLORIDE, AND CALCIUM CHLORIDE: .6; .31; .03; .02 INJECTION, SOLUTION INTRAVENOUS at 10:18

## 2025-07-08 NOTE — H&P
"History and Physical -  [facility]  Yun Simmons 69 y.o. female MRN: [id number]    HPI: Yun Simmons is a 69 y.o. year old female who presents for dysphagia, epigastric pain    REVIEW OF SYSTEMS: Per the HPI, and otherwise unremarkable.    Historical Information   Past Medical History[1]  Past Surgical History[2]  Social History   Social History     Substance and Sexual Activity   Alcohol Use Never     Social History     Substance and Sexual Activity   Drug Use Not Currently     Tobacco Use History[3]  Family History[4]    Meds/Allergies     Current Medications[5]    Allergies[6]    Objective     /64   Pulse 82   Temp (!) 97.4 °F (36.3 °C) (Temporal)   Resp 18   Ht 5' 5\" (1.651 m)   Wt 88.9 kg (196 lb)   SpO2 95%   BMI 32.62 kg/m²     PHYSICAL EXAM    Gen: NAD AAOx3  Head: Normocephalic, Atraumatic  CV: S1S2 RRR no m/r/g  CHEST: Clear b/l no c/r/w  ABD: soft, +BS NT/ND  EXT: no edema    ASSESSMENT/PLAN:  This is a 69 y.o. year old female here for EGD, and she is stable and optimized for her procedure.             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     Diabetes mellitus (HCC)     GERD (gastroesophageal reflux disease)     Renal disorder     Sleep apnea    [2]   Past Surgical History:  Procedure Laterality Date     SECTION      COLONOSCOPY      RENAL CYST EXCISION      SHOULDER SURGERY      plastic hardware    TONSILLECTOMY      US GUIDED THYROID BIOPSY  2021   [3]   Social History  Tobacco Use   Smoking Status Former    Current packs/day: 0.00    Average packs/day: 0.5 packs/day for 8.8 years (4.4 ttl pk-yrs)    Types: Cigarettes    Start date:     Quit date: 10/29/1981    Years since quittin.7   Smokeless Tobacco Never   [4]   Family History  Problem Relation Name Age of Onset    COPD Mother      Lung cancer Mother      Lung cancer Father      Hypertension Father      No Known " Problems Sister      Hypertension Brother      Colon polyps Brother      Kidney disease Brother      Colon polyps Brother      Stroke Brother      Kidney disease Brother      Hypertension Brother      Hyperlipidemia Brother      Gestational diabetes Sister      Colon cancer Maternal Aunt     [5]   Current Outpatient Medications:     acetaZOLAMIDE (DIAMOX) 125 mg tablet    Cholecalciferol (Vitamin D3) 50 MCG (2000 UT) capsule    famotidine (PEPCID) 40 MG tablet    losartan (COZAAR) 50 mg tablet    metFORMIN (GLUCOPHAGE) 1000 MG tablet    Multiple Vitamins-Minerals (PreserVision AREDS 2+Multi Vit) CAPS    Myrbetriq 50 MG TB24    pantoprazole (PROTONIX) 40 mg tablet    Potassium Citrate ER 15 MEQ (1620 MG) TBCR    simvastatin (ZOCOR) 40 mg tablet    citric acid-potassium citrate (POLYCITRA K) 1,100-334 mg/5 mL solution    clopidogrel (PLAVIX) 75 mg tablet    fluticasone (FLONASE) 50 mcg/act nasal spray    halobetasol (ULTRAVATE) 0.05 % ointment    ondansetron (ZOFRAN) 8 mg tablet    zoledronic acid (Reclast) 5 mg/100 mL IV infusion (premix)    Current Facility-Administered Medications:     lactated ringers infusion, 125 mL/hr, Intravenous, Continuous  [6]   Allergies  Allergen Reactions    Demerol [Meperidine] GI Intolerance     Other reaction(s): N/V    Milk-Related Compounds - Food Allergy Sneezing    Morphine GI Intolerance     Other reaction(s): N/V    Farxiga [Dapagliflozin] Other (See Comments)     Tongue burning    Meloxicam Swelling

## 2025-07-08 NOTE — ANESTHESIA POSTPROCEDURE EVALUATION
Post-Op Assessment Note    CV Status:  Stable  Pain Score: 0    Pain management: adequate       Mental Status:  Alert and awake   Hydration Status:  Stable   PONV Controlled:  None   Airway Patency:  Patent     Post Op Vitals Reviewed: Yes    No anethesia notable event occurred.    Staff: CRNA           Last Filed PACU Vitals:  Vitals Value Taken Time   Temp     Pulse 65 07/08/25 10:32   /56 07/08/25 10:32   Resp 20 07/08/25 10:32   SpO2 94 % 07/08/25 10:32

## 2025-07-08 NOTE — ANESTHESIA PREPROCEDURE EVALUATION
Procedure:  EGD    Relevant Problems   CARDIO   (+) Essential hypertension   (+) Hyperlipidemia   (+) Migraine without aura and without status migrainosus, not intractable      ENDO   (+) Type 2 diabetes mellitus without complication, without long-term current use of insulin (HCC)      NEURO/PSYCH   (+) Migraine without aura and without status migrainosus, not intractable   (+) Visual disturbance      PULMONARY   (+) DESTINEY on CPAP    GERD      7/2020 EGD: patient noted to be challenging to sedate with a very small mouth opening.  Nasal trumpet used      Plavix  Physical Exam    Airway   Comment: Micrognathic   Mallampati score: III  TM Distance: <3 FB  Neck ROM: full  Mouth opening: < 4 cm      Cardiovascular  Rhythm: regular, Rate: normal    Dental   Comment: None loose, No notable dental hx     Pulmonary      Neurological    She appears awake, alert and oriented x3.      Other Findings  post-pubertal.        ECHO (2023)    Left Ventricle: Left ventricular cavity size is normal. Wall thickness is normal. The left ventricular ejection fraction is 65% by biplane measurement. Wall motion is normal. Diastolic function is normal for age.     Anesthesia Plan  ASA Score- 3     Anesthesia Type- IV sedation with anesthesia with ASA Monitors.         Additional Monitors:     Airway Plan: natural airway.    Comment: 08:00 - sip of water with meds. Otherwise NPO    Patient educated on the possibility for awareness under sedation and of the possibility of airway intervention in the event of an airway or procedural emergency    Available labwork, imaging, and diagnostic studies relevant to the procedure reviewed including prior anesthetic records. Patient is acceptable for the intended procedure    Plan to use hurricane spray to decrease the amount of propofol needed given patient's history of desaturation and airway obstruction  .       Plan Factors-Exercise tolerance (METS): >4 METS.    Chart reviewed.    Patient summary  reviewed.    Patient is not a current smoker.              Induction- intravenous.    Postoperative Plan- .   Monitoring Plan - Monitoring plan - standard ASA monitoring      Perioperative Resuscitation Plan - Level 1 - Full Code.       Informed Consent- Anesthetic plan and risks discussed with patient.  I personally reviewed this patient with the CRNA. Discussed and agreed on the Anesthesia Plan with the CRNA..      NPO Status:  Vitals Value Taken Time   Date of last liquid 07/08/25 07/08/25 09:34   Time of last liquid 0800 07/08/25 09:34   Date of last solid 07/07/25 07/08/25 09:34   Time of last solid 2200 07/08/25 09:34

## 2025-07-12 PROCEDURE — 88305 TISSUE EXAM BY PATHOLOGIST: CPT | Performed by: STUDENT IN AN ORGANIZED HEALTH CARE EDUCATION/TRAINING PROGRAM

## 2025-08-19 ENCOUNTER — TELEPHONE (OUTPATIENT)
Dept: NEUROLOGY | Facility: CLINIC | Age: 70
End: 2025-08-19